# Patient Record
Sex: MALE | HISPANIC OR LATINO | Employment: UNEMPLOYED | URBAN - METROPOLITAN AREA
[De-identification: names, ages, dates, MRNs, and addresses within clinical notes are randomized per-mention and may not be internally consistent; named-entity substitution may affect disease eponyms.]

---

## 2021-06-07 ENCOUNTER — HOSPITAL ENCOUNTER (EMERGENCY)
Facility: CLINIC | Age: 51
Discharge: PSYCHIATRIC OR SUBSTANCE USE FACILITY | End: 2021-06-07
Attending: EMERGENCY MEDICINE | Admitting: EMERGENCY MEDICINE

## 2021-06-07 ENCOUNTER — COMMUNICATION - HEALTHEAST (OUTPATIENT)
Dept: CARE COORDINATION | Facility: HOSPITAL | Age: 51
End: 2021-06-07

## 2021-06-07 ENCOUNTER — APPOINTMENT (OUTPATIENT)
Dept: CT IMAGING | Facility: CLINIC | Age: 51
End: 2021-06-07
Attending: EMERGENCY MEDICINE

## 2021-06-07 ENCOUNTER — TELEPHONE (OUTPATIENT)
Dept: BEHAVIORAL HEALTH | Facility: CLINIC | Age: 51
End: 2021-06-07

## 2021-06-07 VITALS
DIASTOLIC BLOOD PRESSURE: 82 MMHG | RESPIRATION RATE: 16 BRPM | TEMPERATURE: 97.9 F | OXYGEN SATURATION: 98 % | SYSTOLIC BLOOD PRESSURE: 127 MMHG | HEART RATE: 67 BPM

## 2021-06-07 DIAGNOSIS — F29 PSYCHOSIS, UNSPECIFIED PSYCHOSIS TYPE (H): ICD-10-CM
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
## 2021-06-07 LAB
ALBUMIN SERPL-MCNC: 4.1 G/DL (ref 3.4–5)
ALBUMIN UR-MCNC: 20 MG/DL
ALP SERPL-CCNC: 97 U/L (ref 40–150)
ALT SERPL W P-5'-P-CCNC: 33 U/L (ref 0–70)
AMMONIA PLAS-SCNC: 30 UMOL/L (ref 10–50)
AMPHETAMINES UR QL SCN: NEGATIVE
ANION GAP SERPL CALCULATED.3IONS-SCNC: 5 MMOL/L (ref 3–14)
APAP SERPL-MCNC: <2 MG/L (ref 10–20)
APPEARANCE UR: CLEAR
AST SERPL W P-5'-P-CCNC: 29 U/L (ref 0–45)
BARBITURATES UR QL: NEGATIVE
BASE EXCESS BLDV CALC-SCNC: 1.8 MMOL/L
BASOPHILS # BLD AUTO: 0.1 10E9/L (ref 0–0.2)
BASOPHILS NFR BLD AUTO: 0.6 %
BENZODIAZ UR QL: NEGATIVE
BILIRUB SERPL-MCNC: 0.7 MG/DL (ref 0.2–1.3)
BILIRUB UR QL STRIP: NEGATIVE
BUN SERPL-MCNC: 19 MG/DL (ref 7–30)
CALCIUM SERPL-MCNC: 8.8 MG/DL (ref 8.5–10.1)
CANNABINOIDS UR QL SCN: NEGATIVE
CHLORIDE SERPL-SCNC: 109 MMOL/L (ref 94–109)
CO2 SERPL-SCNC: 27 MMOL/L (ref 20–32)
COCAINE UR QL: NEGATIVE
COLOR UR AUTO: YELLOW
CREAT SERPL-MCNC: 0.88 MG/DL (ref 0.66–1.25)
DIFFERENTIAL METHOD BLD: NORMAL
EOSINOPHIL # BLD AUTO: 0.3 10E9/L (ref 0–0.7)
EOSINOPHIL NFR BLD AUTO: 2.9 %
ERYTHROCYTE [DISTWIDTH] IN BLOOD BY AUTOMATED COUNT: 12.7 % (ref 10–15)
ETHANOL SERPL-MCNC: <0.01 G/DL
GFR SERPL CREATININE-BSD FRML MDRD: >90 ML/MIN/{1.73_M2}
GLUCOSE SERPL-MCNC: 128 MG/DL (ref 70–99)
GLUCOSE UR STRIP-MCNC: NEGATIVE MG/DL
HCO3 BLDV-SCNC: 27 MMOL/L (ref 21–28)
HCT VFR BLD AUTO: 44 % (ref 40–53)
HGB BLD-MCNC: 14.7 G/DL (ref 13.3–17.7)
HGB UR QL STRIP: NEGATIVE
IMM GRANULOCYTES # BLD: 0 10E9/L (ref 0–0.4)
IMM GRANULOCYTES NFR BLD: 0.2 %
INR PPP: 1.09 (ref 0.86–1.14)
INTERPRETATION ECG - MUSE: NORMAL
KETONES UR STRIP-MCNC: 10 MG/DL
LABORATORY COMMENT REPORT: NORMAL
LACTATE BLD-SCNC: 1.3 MMOL/L (ref 0.7–2)
LEUKOCYTE ESTERASE UR QL STRIP: ABNORMAL
LYMPHOCYTES # BLD AUTO: 1.5 10E9/L (ref 0.8–5.3)
LYMPHOCYTES NFR BLD AUTO: 16.3 %
MCH RBC QN AUTO: 29.1 PG (ref 26.5–33)
MCHC RBC AUTO-ENTMCNC: 33.4 G/DL (ref 31.5–36.5)
MCV RBC AUTO: 87 FL (ref 78–100)
MONOCYTES # BLD AUTO: 0.8 10E9/L (ref 0–1.3)
MONOCYTES NFR BLD AUTO: 8.9 %
MUCOUS THREADS #/AREA URNS LPF: PRESENT /LPF
NEUTROPHILS # BLD AUTO: 6.4 10E9/L (ref 1.6–8.3)
NEUTROPHILS NFR BLD AUTO: 71.1 %
NITRATE UR QL: NEGATIVE
NRBC # BLD AUTO: 0 10*3/UL
NRBC BLD AUTO-RTO: 0 /100
O2/TOTAL GAS SETTING VFR VENT: NORMAL %
OPIATES UR QL SCN: NEGATIVE
PCO2 BLDV: 45 MM HG (ref 40–50)
PCP UR QL SCN: NEGATIVE
PH BLDV: 7.39 PH (ref 7.32–7.43)
PH UR STRIP: 6 PH (ref 5–7)
PLATELET # BLD AUTO: 288 10E9/L (ref 150–450)
PO2 BLDV: 37 MM HG (ref 25–47)
POTASSIUM SERPL-SCNC: 3.4 MMOL/L (ref 3.4–5.3)
PROT SERPL-MCNC: 8 G/DL (ref 6.8–8.8)
RBC # BLD AUTO: 5.05 10E12/L (ref 4.4–5.9)
RBC #/AREA URNS AUTO: 2 /HPF (ref 0–2)
SALICYLATES SERPL-MCNC: <2 MG/DL
SARS-COV-2 RNA RESP QL NAA+PROBE: NEGATIVE
SODIUM SERPL-SCNC: 141 MMOL/L (ref 133–144)
SOURCE: ABNORMAL
SP GR UR STRIP: 1.01 (ref 1–1.03)
SPECIMEN SOURCE: NORMAL
SQUAMOUS #/AREA URNS AUTO: <1 /HPF (ref 0–1)
TROPONIN I SERPL-MCNC: <0.015 UG/L (ref 0–0.04)
TSH SERPL DL<=0.005 MIU/L-ACNC: 1.21 MU/L (ref 0.4–4)
UROBILINOGEN UR STRIP-MCNC: 6 MG/DL (ref 0–2)
WBC # BLD AUTO: 9 10E9/L (ref 4–11)
WBC #/AREA URNS AUTO: 16 /HPF (ref 0–5)

## 2021-06-07 PROCEDURE — 84484 ASSAY OF TROPONIN QUANT: CPT | Performed by: EMERGENCY MEDICINE

## 2021-06-07 PROCEDURE — 84443 ASSAY THYROID STIM HORMONE: CPT | Performed by: EMERGENCY MEDICINE

## 2021-06-07 PROCEDURE — 80307 DRUG TEST PRSMV CHEM ANLYZR: CPT | Performed by: EMERGENCY MEDICINE

## 2021-06-07 PROCEDURE — 85025 COMPLETE CBC W/AUTO DIFF WBC: CPT | Performed by: EMERGENCY MEDICINE

## 2021-06-07 PROCEDURE — 85610 PROTHROMBIN TIME: CPT | Performed by: EMERGENCY MEDICINE

## 2021-06-07 PROCEDURE — 93005 ELECTROCARDIOGRAM TRACING: CPT

## 2021-06-07 PROCEDURE — 80053 COMPREHEN METABOLIC PANEL: CPT | Performed by: EMERGENCY MEDICINE

## 2021-06-07 PROCEDURE — 87635 SARS-COV-2 COVID-19 AMP PRB: CPT | Performed by: EMERGENCY MEDICINE

## 2021-06-07 PROCEDURE — 70450 CT HEAD/BRAIN W/O DYE: CPT

## 2021-06-07 PROCEDURE — 83605 ASSAY OF LACTIC ACID: CPT | Performed by: EMERGENCY MEDICINE

## 2021-06-07 PROCEDURE — C9803 HOPD COVID-19 SPEC COLLECT: HCPCS

## 2021-06-07 PROCEDURE — 81001 URINALYSIS AUTO W/SCOPE: CPT | Performed by: EMERGENCY MEDICINE

## 2021-06-07 PROCEDURE — 99285 EMERGENCY DEPT VISIT HI MDM: CPT | Mod: 25

## 2021-06-07 PROCEDURE — 90791 PSYCH DIAGNOSTIC EVALUATION: CPT

## 2021-06-07 PROCEDURE — 96360 HYDRATION IV INFUSION INIT: CPT

## 2021-06-07 PROCEDURE — 82077 ASSAY SPEC XCP UR&BREATH IA: CPT | Performed by: EMERGENCY MEDICINE

## 2021-06-07 PROCEDURE — 96361 HYDRATE IV INFUSION ADD-ON: CPT

## 2021-06-07 PROCEDURE — 82140 ASSAY OF AMMONIA: CPT | Performed by: EMERGENCY MEDICINE

## 2021-06-07 PROCEDURE — 258N000003 HC RX IP 258 OP 636: Performed by: EMERGENCY MEDICINE

## 2021-06-07 PROCEDURE — 82803 BLOOD GASES ANY COMBINATION: CPT | Performed by: EMERGENCY MEDICINE

## 2021-06-07 PROCEDURE — 80143 DRUG ASSAY ACETAMINOPHEN: CPT | Performed by: EMERGENCY MEDICINE

## 2021-06-07 PROCEDURE — 80179 DRUG ASSAY SALICYLATE: CPT | Performed by: EMERGENCY MEDICINE

## 2021-06-07 RX ORDER — LIDOCAINE 40 MG/G
CREAM TOPICAL
Status: DISCONTINUED | OUTPATIENT
Start: 2021-06-07 | End: 2021-06-07 | Stop reason: HOSPADM

## 2021-06-07 RX ORDER — SODIUM CHLORIDE 9 MG/ML
INJECTION, SOLUTION INTRAVENOUS CONTINUOUS
Status: DISCONTINUED | OUTPATIENT
Start: 2021-06-07 | End: 2021-06-07 | Stop reason: HOSPADM

## 2021-06-07 RX ADMIN — SODIUM CHLORIDE 1000 ML: 9 INJECTION, SOLUTION INTRAVENOUS at 07:38

## 2021-06-07 NOTE — ED NOTES
Went into room, via  services, talked to patient about the need and giving us a urine sample.  Patient still remains non verbal but has eyes open and gazing forward with tears coming down his face. Explained that I would leave the urinal at bedside and give him some privacy and a few minutes to give us a sample. RN notified

## 2021-06-07 NOTE — ED PROVIDER NOTES
History   Chief Complaint:  Mental Health Problem     The history is provided by the patient and the EMS personnel. History limited by: Mental status change. A  was used (Icelandic).      Juan Mackenzie is a 50 year old male who presents with mental health problem. He was here two days ago, presents via EMS. He was found laying in the grass outside of a store and was brought in by EMS. He apparently would not respond to sternal rub or talk to them even using an . Here, the patient is complaining of that he was tired so he laid down. This happened approximately 30 days. He denies he has any symptoms. He has no other symptoms other than being tired. He denies loss of consciousness. He did say there is someone in the [employer] board who is trying to hurt him and tying to move the needle. He denies any thoughts of harming himself.     Review of Systems   Unable to perform ROS: Mental status change     Allergies:  Not reviewed.     Medications:  Not reviewed.     Past Medical History:    Not reviewed.     Social History:  Arrives via EMS.     Physical Exam     Patient Vitals for the past 24 hrs:   BP Temp Temp src Pulse Resp SpO2   06/07/21 1320 122/82 -- -- 65 -- 97 %   06/07/21 1030 92/63 -- -- 81 -- 94 %   06/07/21 1015 96/72 -- -- 79 -- 98 %   06/07/21 1000 95/69 -- -- 87 -- 92 %   06/07/21 0945 95/62 -- -- 84 -- 92 %   06/07/21 0930 96/71 -- -- 85 -- 92 %   06/07/21 0915 104/76 -- -- 88 -- 94 %   06/07/21 0830 110/75 -- -- 92 -- 92 %   06/07/21 0815 125/86 -- -- 79 -- 99 %   06/07/21 0800 132/78 -- -- 87 -- 98 %   06/07/21 0709 (!) 134/94 97.9  F (36.6  C) Axillary 95 16 96 %       Physical Exam    General: The patient is alert, in no respiratory distress.    HENT: Mucous membranes moist. Eye are injected.     Cardiovascular: Regular rate and rhythm. Good pulses in all four extremities. Normal capillary refill and skin turgor.     Respiratory: Lungs are clear. No nasal flaring. No  retractions. No wheezing, no crackles.    Gastrointestinal: Abdomen soft. No guarding, no rebound. No palpable hernias.     Musculoskeletal: No gross deformity.     Skin: No rashes or petechiae.     Neurologic: The patient is aler. GCS 15.  Follows commands.  We will discuss questions with the .  Good strength in all extremities.  Gross sensation intact. Pupils are round and reactive. No meningismus. Speech is appropriate via Chilean interpretor.     Lymphatic: No cervical adenopathy. No lower extremity swelling.    Psychiatric: The patient is non-tearful.  He does report hallucinations.    Emergency Department Course   ECG  ECG taken at 0955, ECG read at 0955  Normal sinus rhythm   Normal ECG   Rate 94 bpm. ID interval 152 ms. QRS duration 84 ms. QT/QTc 380/475 ms. P-R-T axes 42 -8 -15.     Imaging:  CT Head w/o Contrast  No acute intracranial abnormality.  Reading per radiology    Laboratory:  CBC: WBC 9.0, HGB 14.7,    CMP: Glucose 128 (H), o/w WNL (Creatinine: 0.88)    Troponin (Collected 0739): <0.015  Lactic Acid (Collected at 0739): 1.3  INR: 1.09    TSH: 1.21    UA with Microscopic: Ketone: 10 (A), Protein Albumin: 20 (A), Urobilinogen: 6.0 (H), Leukocyte Esterase: Trace (A), WBC: 16 (H), Mucous: Present (A)  Ammonia: 30    Blood Gas Venous: pH: 7.39, PCO2: 45, PO2: 37, Bicarbonate: 27, FIO2 Room Air, base excess 1.8     Acetaminophen: <2  Salicylate level: <2   Alcohol ethyl: <0.01    Drug abuse screen 77 urine: Pending  Asymptomatic COVID19 Virus PCR by nasopharyngeal swab: Negative    Emergency Department Course:    Reviewed:  I reviewed nursing notes and vitals    Assessments:  0712 I obtained history and examined the patient as noted above.   1247 I spoke to the patient and informed him our his lab results and plan of care.     Consults:   1240 Patient will be sent to [facility] at 1700.     Interventions:  0738 0.9% NaCl bolus 1,000 mL IV    Disposition:  The patient  was transferred to [facility] via ambulance.   Impression & Plan     Medical Decision Making:  The patient presented via EMS as a syncopal event.  However there was report that he was less responsive.  However I upon walking up to him in his hallway bed was able to get him to way back to me to mirror me in showing thumbs up with both hands I could not find any focal deficit.  I was able to use rudimentary Sami to talk to him and he answered me and then for the rest of the interview I used a formal .  I did consider intracranial process such as stroke and seizure but felt these are less likely I cannot find a deficit.  I also considered alcohol intoxication or drug intoxication however there is really no alkaline system.  He does not appear to be postictal.  The patient did however via  admit to me that he was being persecuted by someone via [employer] board and that those voices were telling him things.  He denies being suicidal.  He was advised by malcolm and will be sent to an inpatient psychiatric bed.  I do not find anything to suggest an ongoing or life-threatening medical condition behind this.  He does at times appear to be distracted by voices.  He is homeless and has had inadequate sleep he reports as well.    Covid-19  Juan Mackenzie was evaluated during a global COVID-19 pandemic, which necessitated consideration that the patient might be at risk for infection with the SARS-CoV-2 virus that causes COVID-19.   Applicable protocols for evaluation were followed during the patient's care. COVID-19 was considered as part of the patient's evaluation. The plan for testing is: a test was obtained during this visit which returned negative.     Diagnosis:    ICD-10-CM    1. Psychosis, unspecified psychosis type (H)  F29    2. Fall, initial encounter  W19.XXXA        Scribe Disclosure:  I, Viral Hernandez, am serving as a scribe at 7:11 AM on 6/7/2021 to document services  personally performed by Joe Murillo MD based on my observations and the provider's statements to me.              Joe Murillo MD  06/07/21 2800

## 2021-06-07 NOTE — ED NOTES
Attempted to call [facility]. Told that they can't take patient at moment due to staffing and no foreseeable changes to that and that we should touch base with placement. HUC made aware and to call patient placement to see what to do next.

## 2021-06-07 NOTE — TELEPHONE ENCOUNTER
"S: 1:05p, Samantha called w/ clinical on a 50y/M present in the [facility] w/ auditory hallucinations and altered mental status.     B: The pt was transported to the [facility] after being found in the grass out side of a [employer] grocery store passed out. The pt presents with  auditory hallucinations and altered mental status. The pt states that he hears a woman's voice that is \"mean\" and is telling him to \"harm himself\", pt stated to the  that he doesn't want to. The pt also stated to the  that \"there is someone in the Ouija board trying to hurt him and move the needle\". The  reported the pt goes by many nicknames but could not provide any of them due to his intermittent catatonic state, the pt answers where also not consistant. The pt was responding to internal stimuli during the assessment. The pt denied any substance use.      The pt has a MH Hx of schizophrenia.     The pt is not on any Rx.     The pt does not have any outside providers.     The pt denies any SI/HI.     No concern for aggression.     Covid resulted as negative.   Utox needs to be collected.   Comp labs complete- all within normal ranges.  CBC labs complete- all within normal ranges.      A: 72 hr hold.     R: The pt is currently in the [facility] awaiting bed placement.     1:43p Intake paged provider to present for Unit 30 ([place]).     1:52p Provider returned intakes page- provider accepts, provider would like to see the Utox collected before transfer.     2:00p Intake called Unit 10 to go over admission in que. The Charge RN is currently on break. Intake left a message to call intake when available to go over admission in que.      2:10p Intake called [facility] ER with provider request for admission.    3:30p Intake called Unit 10 Intake called Unit 10 to go over admission in que. The Charge RN is currently still in report.  Intake left a message to call intake when available to go over admission in que.     4:40p Unit " 30 called intake- the unit cannot take the pt due to staffing and acuity.     4:48p Intake called [facility] to notify the RN the pt would not be going to Unit 30. Intake identifying appropriate bed placement options.     4:57p Intake called provider to present for 5500. Provider accepts the pt.      5:05p Intake called Unit 5500 to go over admission in que. Intake faxed information to Unit 5500.     5:09p Intake called [facility] to notify the RN of the pt's bed placement.

## 2021-06-07 NOTE — ED NOTES
ED signout Note    Awaiting MH transfer.      Remained calm and transferred without incident.     MD Kalin Viveros, Ezra Ham MD  06/08/21 0134

## 2021-06-07 NOTE — SAFE
"Juan Mackenzie  June 7, 2021    Patient has been hospitalized numerous times prior, likely under another name.  He reported being at [facility] 3 times prior.  There is no record under this name. He is cycling through periods of varying responsiveness, entering into periods of catatonia.  When responsive, he is pleasant and cooperative.  He is aware he has a mental health condition.  He shows schizophrenic symptomatology.      Patient is having auditory and perhaps visual hallucinations. At one point reached out his hand tentatively as if to touch someone or something.  He states one voice in particular wants him to hurt himself.  He states he \"loses consciousness\" on occasion.   He states he takes no medications and has no providers however.      He does not appear to have a permanent residence.   He has shown no aggressiveness in the ED  or with this writer.       Current Suicidal Ideation/Self-Injurious Concerns/Methods: Other Has a command voice encouraging self harm    Inappropriate Sexual Behavior: Unknown    Aggression/Homicidal Ideation: None - N/A      For additional details see full DEC assessment.       Samantha Barillas, BEATRIZSW      "

## 2021-06-07 NOTE — ED NOTES
Pt brought in by ambulance found sleeping in grass by hi v in [facility] police did a sternal rub pt slow to respond  Blew pbp 000. Pt homeless searched and no meds , no drugs and they said I was crazy when I was here . Pt was not found in computer

## 2021-06-08 ENCOUNTER — COMMUNICATION - HEALTHEAST (OUTPATIENT)
Dept: SCHEDULING | Facility: CLINIC | Age: 51
End: 2021-06-08

## 2021-06-16 PROBLEM — F29 PSYCHOSIS, UNSPECIFIED PSYCHOSIS TYPE (H): Status: ACTIVE | Noted: 2021-06-08

## 2021-06-16 PROBLEM — F39 MOOD DISORDER (H): Status: ACTIVE | Noted: 2021-06-07

## 2021-06-16 PROBLEM — F39 PSYCHOSIS, AFFECTIVE (H): Status: ACTIVE | Noted: 2021-06-09

## 2021-06-17 ENCOUNTER — COMMUNICATION - HEALTHEAST (OUTPATIENT)
Dept: SCHEDULING | Facility: CLINIC | Age: 51
End: 2021-06-17

## 2021-06-25 NOTE — TELEPHONE ENCOUNTER
"S: 1:05p, Samantha called w/ clinical on a 50y/M present in the [facility] w/ auditory hallucinations and altered mental status.      B: The pt was transported to the [facility] after being found in the grass out side of a [employer] grocery store passed out. The pt presents with  auditory hallucinations and altered mental status. The pt states that he hears a woman's voice that is \"mean\" and is telling him to \"harm himself\", pt stated to the  that he doesn't want to. The pt also stated to the  that \"there is someone in the Ouija board trying to hurt him and move the needle\". The  reported the pt goes by many nicknames but could not provide any of them due to his intermittent catatonic state, the pt answers where also not consistant. The pt was responding to internal stimuli during the assessment. The pt denied any substance use.       The pt has a MH Hx of schizophrenia.      The pt is not on any Rx.      The pt does not have any outside providers.      The pt denies any SI/HI.      No concern for aggression.      Covid resulted as negative.   Utox needs to be collected.   Comp labs complete- all within normal ranges.  CBC labs complete- all within normal ranges.      A: 72 hr hold.      R: The pt is currently in the [facility] awaiting bed placement.   1:43p Intake paged provider to present for Unit 30 ([place]).      1:52p Provider returned intakes page- provider accepts, provider would like to see the Utox collected before transfer.      2:00p Intake called Unit 10 to go over admission in que. The Charge RN is currently on break. Intake left a message to call intake when available to go over admission in que.       2:10p Intake called [facility] ER with provider request for admission.     3:30p Intake called Unit 10 Intake called Unit 10 to go over admission in que. The Charge RN is currently still in report.  Intake left a message to call intake when available to go over admission in que.   "   4:40p Unit 30 called intake- the unit cannot take the pt due to staffing and acuity.      4:48p Intake called [facility] to notify the RN the pt would not be going to Unit 30. Intake identifying appropriate bed placement options.     4:57p Intake called provider to present for 5500. Provider accepts the pt.     5:05p Intake called Unit 5500 to go over admission in que. Intake faxed information to Unit 5500.     5:09p Intake called [facility] to notify the RN of the pt's bed placement.

## 2022-11-16 ENCOUNTER — HOSPITAL ENCOUNTER (EMERGENCY)
Facility: CLINIC | Age: 52
Discharge: LEFT BEFORE DISCHARGE | End: 2022-11-16
Attending: EMERGENCY MEDICINE | Admitting: EMERGENCY MEDICINE

## 2022-11-16 VITALS
SYSTOLIC BLOOD PRESSURE: 135 MMHG | DIASTOLIC BLOOD PRESSURE: 93 MMHG | OXYGEN SATURATION: 95 % | TEMPERATURE: 99.6 F | RESPIRATION RATE: 18 BRPM | HEART RATE: 92 BPM

## 2022-11-16 DIAGNOSIS — F22 DELUSIONAL DISORDER (H): ICD-10-CM

## 2022-11-16 DIAGNOSIS — Z59.00 HOMELESSNESS: ICD-10-CM

## 2022-11-16 LAB
GLUCOSE BLDC GLUCOMTR-MCNC: 140 MG/DL (ref 70–99)
HOLD SPECIMEN: NORMAL

## 2022-11-16 PROCEDURE — 36415 COLL VENOUS BLD VENIPUNCTURE: CPT | Performed by: EMERGENCY MEDICINE

## 2022-11-16 PROCEDURE — 99283 EMERGENCY DEPT VISIT LOW MDM: CPT

## 2022-11-16 SDOH — ECONOMIC STABILITY - HOUSING INSECURITY: HOMELESSNESS UNSPECIFIED: Z59.00

## 2022-11-16 NOTE — ED TRIAGE NOTES
Beninese speaking pt found in the middle of busy road/intersection today praying. PD had pt in vehicle to stay warm when EMS arrived.      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  Pt also states his brothers were also with him in prayer, but per report he was alone in the street. Now pt states brothers were with him in spirit. When EMS arrived pt refused to speak or open eyes.

## 2022-11-16 NOTE — ED PROVIDER NOTES
"  History   Chief Complaint:  Psychiatric Evaluation       The history is provided by the patient.   History obtained with assistance of Egyptian .    Juan Mackenzie is a 51 year old male with history of pyschosis and prior drug abuse who presents via EMS after he was found praying outside. Patient states that he was praying on the street when he was brought to emergency department. Patient states that he currently lives in a trailer but he wants to get out of the trailer. Patient states that he hears voices sometimes but he does not find them bothersome. He reports he is waiting to hear from \" [place] soldiers\" to tell him \"what to do next\"  patient states that he does not take any medication for his diabetes or for his mental health. Patient states that he is physically fine. He denies suicidal thoughts and thoughts of harming others. He does not remember the last time he had drugs or alcohol. He denies any recent alcohol or drug use.  He denies any other concerns at this time.  When asked directly if he would like any help with his mental health he states \"no.\"    1747  Police report:  Male Uzbek speaking only. Told me he was praying, then he refused to open his eyes or speak. Couldn't walk I believe on his own was laying in a snowbank and running into traffic. In a previous incident told officers he had received psychiatric care but wouldn't elaborate further    Review of Systems   All other systems reviewed and are negative.      Allergies:  The patient has no known allergies to medications.    Medications:  Zyprexa    Past Medical History:     Mood disorder  Psychosis  Abnormal urinalysis  Vasovagal syncope  Delusions  Alcohol abuse    Past Surgical History:    The patient denies any prior surgical history.    Family History:    The patient denies any prior family history.    Social History:  PCP: No Ref-Primary, Physician   Patient arrived via EMS.  Patient is unaccompanied in the " "ED.  Patient denies drug use.  Patient denies current alcohol use.      Physical Exam     Patient Vitals for the past 24 hrs:   BP Temp Temp src Pulse Resp SpO2   11/16/22 1747 (!) 135/93 99.6  F (37.6  C) Temporal 92 18 95 %       Physical Exam  General: Well appearing, nontoxic. Resting comfortably  Head:  Scalp, face, and head appear normal  Eyes:  Pupils are equal, round, reactive to light    Conjunctivae non-injected and sclerae white  ENT:    The external nose is normal    Pinnae are normal  Neck:  Normal range of motion    There is no rigidity noted    Trachea is in the midline  CV:  Regular rate and rhythm     Normal S1/S2, no S3/S4    No murmur or rub. Radial pulses 2+ bilaterally.  Resp:  Lungs are clear and equal bilaterally  There is no tachypnea    No increased work of breathing    No rales, wheezing, or rhonchi  GI:  Abdomen is soft, no rigidity or guarding    No distension, or mass    No tenderness or rebound tenderness   MS:  Normal muscular tone    Symmetric motor strength    No lower extremity edema  Skin:  No rash or acute skin lesions noted  Neuro: Awake and alert oriented to person place and time.  Strength 5 out of 5 and intact.  Gait is normal and steady.  No ataxia.  Speech is normal and fluent  Moves all extremities spontaneously  Psych:  Normal affect.  Patient is calm and cooperative.  He reports that he does hear voices but they are not bothersome to him.  He denies any suicidal or homicidal ideation.  He does not appear to be attending to internal stimuli.  He does display some delusional thoughts regarding \"is really soldiers\".  No psychomotor agitation.      Emergency Department Course   Laboratory:  Labs Ordered and Resulted from Time of ED Arrival to Time of ED Departure   GLUCOSE BY METER - Abnormal       Result Value    GLUCOSE BY METER POCT 140 (*)         Emergency Department Course:           Reviewed:  I reviewed nursing notes, vitals, past medical history and Care " "Everywhere    Assessments/Consults:  ED Course as of 11/16/22 1911 Wed Nov 16, 2022 1747 I obtained history and examined the patient as noted above.    1829 I rechecked the patient.         Disposition:  The patient was discharged to home.     Impression & Plan   Medical Decision Making:  Juan Mackenzie is a 51 year old male who presents to the emergency department for evaluation after he was found praying outside.  On my evaluation he is well-appearing, hemodynamically stable and afebrile.  He is calm cooperative and interacting appropriately.  He does not clinically appear to be acutely intoxicated.  He denies any recent drug or alcohol use.  Patient denies any mental health concerns that he would like assistance with today.  He does note that he hears voices but that they are not bothersome to him.  He has a history of prior admissions for psychosis.  At this time he does display evidence of some delusional thoughts as he is waiting to hear from \"[place] soldiers\" and seems preoccupied with thoughts about Cheondoism and praying.  Mental health assessment via the DEC team was offered but patient declined.  At this time he is not an acute risk of harm to himself or others.  He is suffering from underlying mental health conditions but is not wishing to pursue further help for this at this time.  I encouraged him to follow-up with outpatient mental health resources.  Patient did not want to stay in the emergency department for further evaluation.  No indication for psychiatric hold against his will at this time.  Patient left the emergency department prior to receiving his discharge paperwork. He reports he is currently living in a \"trailer.\"       Diagnosis:    ICD-10-CM    1. Homelessness  Z59.00       2. Delusional disorder (H)  F22           Discharge Medications:  Discharge Medication List as of 11/16/2022  6:34 PM          Scribe Disclosure:  I, Eran Estrada, am serving as a scribe at 5:46 PM on " 11/16/2022 to document services personally performed by Arnaud Escobar MD based on my observations and the provider's statements to me.            Arnaud Escobar MD  11/16/22 1911

## 2022-11-17 NOTE — ED NOTES
Pt ripped off wristband and pulled out IV saying he is going, provider aware and discharging patient.

## 2022-11-17 NOTE — DISCHARGE INSTRUCTIONS
Discharge Instructions  Mental Health Concerns    You were seen today for mental health concerns, such as depression, anxiety, or suicidal thinking. Your provider feels that you do not require hospitalization at this time. However, your symptoms may become worse, and you may need to return to the Emergency Department. Most treatments of depression and suicidal thoughts are a process rather than a single intervention.  Medications and counseling can take several weeks or more to help.    Generally, every Emergency Department visit should have a follow-up clinic visit with either a primary or a specialty clinic/provider. Please follow-up as instructed by your emergency provider today.    By accepting these discharge instructions:  You promise to not harm yourself or others.  You agree that if you feel you are becoming unable to keep that promise, you will do something to help yourself before you do anything to harm yourself or others.   You agree to keep any safety plan arranged on your visit here today.  You agree to take any medication prescribed or recommended by your provider.  If you are getting worse, you can contact a friend or a family member, contact your counselor or family provider, contact a crisis line, or other options discussed with the provider or therapist today.  At any time, you can call 911 and return to the Emergency Department for more help.  You understand that follow-up is essential to your treatment, and you will make and keep appointments recommended on your visit today.    How to improve your mental health and prevent suicide:  Involve others by letting family, friends, counselors know.  Do not isolate yourself.  Avoid alcohol or drugs. Remove weapons, poisons from your home.  Try to stick to routines for eating, sleeping and getting regular exercise.    Try to get into sunlight. Bright natural light not only treats seasonal affective disorder but also depression.  Increase safe activities  that you enjoy.    If you feel worse, contact 1-800-suicide (1-159.181.8986), or call 911, or your primary provider/counselor for additional assistance.    If you were given a prescription for medicine here today, be sure to read all of the information (including the package insert) that comes with your prescription.  This will include important information about the medicine, its side effects, and any warnings that you need to know about.  The pharmacist who fills the prescription can provide more information and answer questions you may have about the medicine.  If you have questions or concerns that the pharmacist cannot address, please call or return to the Emergency Department.   Remember that you can always come back to the Emergency Department if you are not able to see your regular provider in the amount of time listed above, if you get any new symptoms, or if there is anything that worries you.

## 2022-12-29 ENCOUNTER — MEDICAL CORRESPONDENCE (OUTPATIENT)
Dept: EMERGENCY MEDICINE | Facility: CLINIC | Age: 52
End: 2022-12-29

## 2022-12-29 ENCOUNTER — HOSPITAL ENCOUNTER (EMERGENCY)
Facility: CLINIC | Age: 52
Discharge: NURSING, REHAB OR LONG-TERM CARE | End: 2022-12-30
Attending: EMERGENCY MEDICINE | Admitting: EMERGENCY MEDICINE

## 2022-12-29 ENCOUNTER — TELEPHONE (OUTPATIENT)
Dept: BEHAVIORAL HEALTH | Facility: CLINIC | Age: 52
End: 2022-12-29

## 2022-12-29 DIAGNOSIS — Z86.59 HISTORY OF PSYCHOSIS: ICD-10-CM

## 2022-12-29 DIAGNOSIS — R45.851 SUICIDAL IDEATION: ICD-10-CM

## 2022-12-29 LAB
AMPHETAMINES UR QL SCN: NORMAL
ANION GAP SERPL CALCULATED.3IONS-SCNC: 14 MMOL/L (ref 7–15)
BARBITURATES UR QL SCN: NORMAL
BENZODIAZ UR QL SCN: NORMAL
BUN SERPL-MCNC: 15.3 MG/DL (ref 6–20)
BZE UR QL SCN: NORMAL
CALCIUM SERPL-MCNC: 9.5 MG/DL (ref 8.6–10)
CANNABINOIDS UR QL SCN: NORMAL
CHLORIDE SERPL-SCNC: 100 MMOL/L (ref 98–107)
CREAT SERPL-MCNC: 0.78 MG/DL (ref 0.67–1.17)
DEPRECATED HCO3 PLAS-SCNC: 25 MMOL/L (ref 22–29)
ETHANOL SERPL-MCNC: <0.01 G/DL
GFR SERPL CREATININE-BSD FRML MDRD: >90 ML/MIN/1.73M2
GLUCOSE SERPL-MCNC: 140 MG/DL (ref 70–99)
OPIATES UR QL SCN: NORMAL
POTASSIUM SERPL-SCNC: 3.7 MMOL/L (ref 3.4–5.3)
SARS-COV-2 RNA RESP QL NAA+PROBE: NEGATIVE
SODIUM SERPL-SCNC: 139 MMOL/L (ref 136–145)

## 2022-12-29 PROCEDURE — 82077 ASSAY SPEC XCP UR&BREATH IA: CPT | Performed by: EMERGENCY MEDICINE

## 2022-12-29 PROCEDURE — 250N000013 HC RX MED GY IP 250 OP 250 PS 637: Performed by: EMERGENCY MEDICINE

## 2022-12-29 PROCEDURE — U0003 INFECTIOUS AGENT DETECTION BY NUCLEIC ACID (DNA OR RNA); SEVERE ACUTE RESPIRATORY SYNDROME CORONAVIRUS 2 (SARS-COV-2) (CORONAVIRUS DISEASE [COVID-19]), AMPLIFIED PROBE TECHNIQUE, MAKING USE OF HIGH THROUGHPUT TECHNOLOGIES AS DESCRIBED BY CMS-2020-01-R: HCPCS | Performed by: EMERGENCY MEDICINE

## 2022-12-29 PROCEDURE — 80048 BASIC METABOLIC PNL TOTAL CA: CPT | Performed by: EMERGENCY MEDICINE

## 2022-12-29 PROCEDURE — 99285 EMERGENCY DEPT VISIT HI MDM: CPT | Mod: 25

## 2022-12-29 PROCEDURE — 90791 PSYCH DIAGNOSTIC EVALUATION: CPT

## 2022-12-29 PROCEDURE — 80307 DRUG TEST PRSMV CHEM ANLYZR: CPT | Performed by: EMERGENCY MEDICINE

## 2022-12-29 PROCEDURE — 36415 COLL VENOUS BLD VENIPUNCTURE: CPT | Performed by: EMERGENCY MEDICINE

## 2022-12-29 RX ORDER — OLANZAPINE 5 MG/1
10 TABLET, ORALLY DISINTEGRATING ORAL 2 TIMES DAILY
Status: DISCONTINUED | OUTPATIENT
Start: 2022-12-29 | End: 2022-12-30 | Stop reason: HOSPADM

## 2022-12-29 RX ORDER — OLANZAPINE 5 MG/1
5 TABLET, ORALLY DISINTEGRATING ORAL ONCE
Status: COMPLETED | OUTPATIENT
Start: 2022-12-29 | End: 2022-12-29

## 2022-12-29 RX ADMIN — OLANZAPINE 5 MG: 5 TABLET, ORALLY DISINTEGRATING ORAL at 16:49

## 2022-12-29 ASSESSMENT — ACTIVITIES OF DAILY LIVING (ADL)
ADLS_ACUITY_SCORE: 35

## 2022-12-29 ASSESSMENT — COLUMBIA-SUICIDE SEVERITY RATING SCALE - C-SSRS
ATTEMPT LIFETIME: NO
1. HAVE YOU WISHED YOU WERE DEAD OR WISHED YOU COULD GO TO SLEEP AND NOT WAKE UP?: NO
2. HAVE YOU ACTUALLY HAD ANY THOUGHTS OF KILLING YOURSELF?: NO
TOTAL  NUMBER OF ABORTED OR SELF INTERRUPTED ATTEMPTS LIFETIME: NO
TOTAL  NUMBER OF INTERRUPTED ATTEMPTS LIFETIME: NO
6. HAVE YOU EVER DONE ANYTHING, STARTED TO DO ANYTHING, OR PREPARED TO DO ANYTHING TO END YOUR LIFE?: NO

## 2022-12-29 NOTE — ED TRIAGE NOTES
"Patient arrives via EMS, patient was found at a intersection attempting to jump in front of traffic. Patient was waiting at the intersection and would run out in front of the cars as they started to move. Patient reports that he is homeless and does not have family in the [place]. Patient denies having any medical issues but states he hears \"noise in his head\" when asked to clarify patient states that he hears voices in his head that tell him to do offensive things.  Writer asked patient if the voices told him to go in the road and he said \"yes\" but refused to answer all other mental health questions. Patient is also noted to have itching to his bilateral upper legs.  Patient changed into scrubs with no issue, patient has 3 belonging bags and a back pack in the Dec office.      Triage Assessment     Row Name 12/29/22 6525       Triage Assessment (Adult)    Airway WDL WDL       Respiratory WDL    Respiratory WDL WDL       Skin Circulation/Temperature WDL    Skin Circulation/Temperature WDL WDL       Cardiac WDL    Cardiac WDL WDL       Peripheral/Neurovascular WDL    Peripheral Neurovascular WDL WDL       Cognitive/Neuro/Behavioral WDL    Cognitive/Neuro/Behavioral WDL WDL              "

## 2022-12-30 ENCOUNTER — HOSPITAL ENCOUNTER (INPATIENT)
Facility: CLINIC | Age: 52
LOS: 61 days | Discharge: PSYCHIATRIC OR SUBSTANCE USE FACILITY | End: 2023-03-01
Attending: PSYCHIATRY & NEUROLOGY | Admitting: PSYCHIATRY & NEUROLOGY
Payer: MEDICAID

## 2022-12-30 VITALS
RESPIRATION RATE: 18 BRPM | HEART RATE: 71 BPM | OXYGEN SATURATION: 98 % | DIASTOLIC BLOOD PRESSURE: 59 MMHG | SYSTOLIC BLOOD PRESSURE: 98 MMHG | WEIGHT: 132 LBS | TEMPERATURE: 98.1 F

## 2022-12-30 DIAGNOSIS — F25.9 SCHIZOPHRENIA, SCHIZOAFFECTIVE, CHRONIC WITH ACUTE EXACERBATION (H): Primary | ICD-10-CM

## 2022-12-30 PROCEDURE — 250N000013 HC RX MED GY IP 250 OP 250 PS 637: Performed by: PSYCHIATRY & NEUROLOGY

## 2022-12-30 PROCEDURE — 250N000013 HC RX MED GY IP 250 OP 250 PS 637: Performed by: EMERGENCY MEDICINE

## 2022-12-30 PROCEDURE — 124N000003 HC R&B MH SENIOR/ADOLESCENT

## 2022-12-30 RX ORDER — OLANZAPINE 10 MG/1
10 TABLET ORAL 3 TIMES DAILY PRN
Status: DISCONTINUED | OUTPATIENT
Start: 2022-12-30 | End: 2023-03-01 | Stop reason: HOSPADM

## 2022-12-30 RX ORDER — IBUPROFEN 600 MG/1
600 TABLET, FILM COATED ORAL EVERY 6 HOURS PRN
Status: DISCONTINUED | OUTPATIENT
Start: 2022-12-30 | End: 2022-12-30 | Stop reason: HOSPADM

## 2022-12-30 RX ORDER — TRAZODONE HYDROCHLORIDE 50 MG/1
50 TABLET, FILM COATED ORAL
Status: DISCONTINUED | OUTPATIENT
Start: 2022-12-30 | End: 2023-03-01 | Stop reason: HOSPADM

## 2022-12-30 RX ORDER — ACETAMINOPHEN 325 MG/1
650 TABLET ORAL EVERY 4 HOURS PRN
Status: DISCONTINUED | OUTPATIENT
Start: 2022-12-30 | End: 2023-03-01 | Stop reason: HOSPADM

## 2022-12-30 RX ORDER — OLANZAPINE 10 MG/1
10 TABLET, ORALLY DISINTEGRATING ORAL 2 TIMES DAILY
Status: ON HOLD | COMMUNITY
End: 2023-02-15

## 2022-12-30 RX ORDER — LORAZEPAM 1 MG/1
1 TABLET ORAL EVERY 8 HOURS PRN
Status: DISCONTINUED | OUTPATIENT
Start: 2022-12-30 | End: 2022-12-30 | Stop reason: HOSPADM

## 2022-12-30 RX ORDER — OLANZAPINE 10 MG/2ML
10 INJECTION, POWDER, FOR SOLUTION INTRAMUSCULAR 3 TIMES DAILY PRN
Status: DISCONTINUED | OUTPATIENT
Start: 2022-12-30 | End: 2023-03-01 | Stop reason: HOSPADM

## 2022-12-30 RX ORDER — AMOXICILLIN 250 MG
1 CAPSULE ORAL 2 TIMES DAILY PRN
Status: DISCONTINUED | OUTPATIENT
Start: 2022-12-30 | End: 2023-03-01 | Stop reason: HOSPADM

## 2022-12-30 RX ORDER — LANOLIN ALCOHOL/MO/W.PET/CERES
3 CREAM (GRAM) TOPICAL
Status: DISCONTINUED | OUTPATIENT
Start: 2022-12-30 | End: 2022-12-30 | Stop reason: HOSPADM

## 2022-12-30 RX ORDER — LORAZEPAM 1 MG/1
1 TABLET ORAL EVERY 8 HOURS PRN
Status: ON HOLD | COMMUNITY
End: 2023-02-15

## 2022-12-30 RX ORDER — HYDROXYZINE HYDROCHLORIDE 25 MG/1
25 TABLET, FILM COATED ORAL EVERY 4 HOURS PRN
Status: DISCONTINUED | OUTPATIENT
Start: 2022-12-30 | End: 2023-03-01 | Stop reason: HOSPADM

## 2022-12-30 RX ORDER — MAGNESIUM HYDROXIDE/ALUMINUM HYDROXICE/SIMETHICONE 120; 1200; 1200 MG/30ML; MG/30ML; MG/30ML
30 SUSPENSION ORAL EVERY 4 HOURS PRN
Status: DISCONTINUED | OUTPATIENT
Start: 2022-12-30 | End: 2023-03-01 | Stop reason: HOSPADM

## 2022-12-30 RX ORDER — IBUPROFEN 600 MG/1
600 TABLET, FILM COATED ORAL EVERY 6 HOURS PRN
Status: ON HOLD | COMMUNITY
End: 2023-02-15

## 2022-12-30 RX ORDER — OLANZAPINE 10 MG/1
10 TABLET, ORALLY DISINTEGRATING ORAL 2 TIMES DAILY
Status: DISCONTINUED | OUTPATIENT
Start: 2022-12-30 | End: 2023-01-01

## 2022-12-30 RX ADMIN — OLANZAPINE 10 MG: 10 TABLET, ORALLY DISINTEGRATING ORAL at 20:06

## 2022-12-30 RX ADMIN — OLANZAPINE 10 MG: 5 TABLET, ORALLY DISINTEGRATING ORAL at 11:02

## 2022-12-30 ASSESSMENT — ACTIVITIES OF DAILY LIVING (ADL)
ADLS_ACUITY_SCORE: 35
DIFFICULTY_EATING/SWALLOWING: NO
CONCENTRATING,_REMEMBERING_OR_MAKING_DECISIONS_DIFFICULTY: YES
ADLS_ACUITY_SCORE: 35
ADLS_ACUITY_SCORE: 45
NUMBER_OF_TIMES_PATIENT_HAS_FALLEN_WITHIN_LAST_SIX_MONTHS: 1
ADLS_ACUITY_SCORE: 35
TOILETING_ISSUES: NO
DRESSING/BATHING_DIFFICULTY: NO
ORAL_HYGIENE: INDEPENDENT
DOING_ERRANDS_INDEPENDENTLY_DIFFICULTY: NO
ADLS_ACUITY_SCORE: 35
ADLS_ACUITY_SCORE: 31
CHANGE_IN_FUNCTIONAL_STATUS_SINCE_ONSET_OF_CURRENT_ILLNESS/INJURY: NO
ADLS_ACUITY_SCORE: 35
ADLS_ACUITY_SCORE: 31
DRESS: INDEPENDENT
ADLS_ACUITY_SCORE: 35
HYGIENE/GROOMING: INDEPENDENT
ADLS_ACUITY_SCORE: 35
ADLS_ACUITY_SCORE: 35
WALKING_OR_CLIMBING_STAIRS_DIFFICULTY: NO
WEAR_GLASSES_OR_BLIND: YES
ADLS_ACUITY_SCORE: 35

## 2022-12-30 NOTE — PHARMACY-ADMISSION MEDICATION HISTORY
Medication Reconciliation is completed.  Patient is currently not taking any medications prior to this admission per patient / Ghazal Florez RN.                   December 30, 2022                    Eben Cisneros RP

## 2022-12-30 NOTE — TELEPHONE ENCOUNTER
No appropriate beds are currently available within the  system. Bed search update ([facility]) @ 03:15AM:      [facility]: @ cap per website  [facility]: @ cap per website  [facility]: @ cap per website. Called @ 03:17 as website has not been updated within 12 hours. Per Jeremias they do not have beds available but suggests calling back after 08:30  [facility]: @ cap per website  [facility]: @ cap per website  [facility]: @ cap per website  [facility]: @ cap per website  [facility]: @cap per website    Pt remains on work list until appropriate placement is available

## 2022-12-30 NOTE — PLAN OF CARE
Juan Ordoñez  December 29, 2022  Plan of Care Hand-off Note     Patient Care Path: Inpatient Mental Health    Plan for Care:     Brought in by EMS after seen walking into moving traffic in an intersection. Providing limited history of report of events; denying SI but is not able to explain actions. Is reporting hearing constant voices and told triage that the voices told him to talk into traffic.     Pt with history of psychosis per his report and per chart with one prior admission in 2021. He has not been receiving any mental health services since time. Today is providing unreliable history and is guarded but was engaging in behavior that could have resulted in serious injury and is not able to provide any clarity or reasoning for this. Is experiencing active auditory  hallucinations. Recommend inpatient MH for safety and stabilization.     Critical Safety Issues: None.    Overview:  This patient is a child/adolescent: No    This patient has additional special visitor precautions: No    Legal Status: Voluntary    Contacts:   None provided    Psychiatry Consult:  Psychiatry Consult not requested because not needed at this time.     Updated Attending Provider regarding plan of care.    Jenny Vincent, BEATRIZSW

## 2022-12-30 NOTE — TELEPHONE ENCOUNTER
10:49 AM Intake paged Dr. Birch for presentation onto station 3A. Intake awaiting callback     10:54 AM Intake received call from Dr. Birch that patient is accepted to station 3A but patient should be reviewed by Carli RN manager since patient is 52 years old and 3B is 55+.     11:00 AM Intake called Carli RN manager for review of patient. Intake awaiting callback.     12:01 PM Intake received message that Carli accepts patient for 3B admission.     12:09 PM Patient added to queue, indicia completed and insurance benefits sent to [employer].     12:31 PM Intake called 3B charge RN and provided update about patient in queue. Charge RN states that staffing is short this shift and 3B will call intake when ready to take the patient. Intake will reach out to Jorge after hearing from charge RN.     1:43 PM Farideh STEVENSON charge RN called back, discharge will happen at 3pm which will open bed for patient. Jorge to call for report at 4pm per Farideh request.     1:48 PM Intake called Jorge Lombardi with patient placement information. Jorge RN to call for report after 4pm.

## 2022-12-30 NOTE — ED NOTES
Bed: ED14  Expected date: 12/30/22  Expected time: 6:14 AM  Means of arrival:   Comments:  HOld for 34

## 2022-12-30 NOTE — ED NOTES
Report given to LISSY Kramer at [facility] 3B. Updated patient on transfer, patient verbalized understanding. Patient remains calm, quiet cooperative throughout shift. Ate breakfast and lunch. Tolerated Po zyprexa this AM. Sleeping most of shift. 1:1 sitter remains at bedside.

## 2022-12-30 NOTE — ED PROVIDER NOTES
History     Chief Complaint:  Suicidal       HPI   Juan Ordoñez is a 52 year old male with apparent history of psychosis who presents for evaluation after abnormal behavior this evening.  The patient was reportedly in street attempting to run in front of cars.  He was guarded with police who picked him up and brought him here but indicated to an ED RN that he is having voices that are telling him to hurt himself including to run in front of cars.  In conversations with me, the patient denies any concerns but is evasive regarding why he was in the street.    Review of Systems   Unable to perform ROS: Mental status change     Psych: + as per above      Allergies:  No Known Allergies     Medications:    None      Past Medical History:    Psychosis     Past Surgical History:    None    Family History:    None    Social History:  Presents alone    Physical Exam   Patient Vitals for the past 24 hrs:   BP Temp Temp src Pulse Resp SpO2   12/29/22 1600 136/86 -- -- -- -- 97 %   12/29/22 1558 136/86 98.1  F (36.7  C) Oral 94 18 97 %        Physical Exam  Constitutional: Alert, attentive  HENT:    Nose: Nose normal.    Mouth/Throat: Oropharynx is clear, mucous membranes are moist   Eyes: EOM are normal.   CV: regular rate and rhythm; no murmurs, rubs or gallups  Chest: Effort normal and breath sounds normal.   GI:  There is no tenderness. No distension. Normal bowel sounds  MSK: Normal range of motion.   Neurological: Alert, attentive  Skin: Skin is warm and dry.  PSYCH:  Appearance:  awake, alert, adequately groomed, dressed in street clothes and appeared as age stated  Attitude:  cooperative  Eye Contact:  fair  Mood:  depressed  Affect:  appropriate and in normal range and mood congruent  Speech:  clear, coherent  Psychomotor Behavior:  no evidence of tardive dyskinesia, dystonia, or tics  Thought Process:  Unable to assess      Emergency Department Course     Results for orders placed or performed during the  hospital encounter of 12/29/22 (from the past 24 hour(s))   Alcohol level blood   Result Value Ref Range    Alcohol ethyl <0.01 <=0.01 g/dL   Basic metabolic panel (BMP)   Result Value Ref Range    Sodium 139 136 - 145 mmol/L    Potassium 3.7 3.4 - 5.3 mmol/L    Chloride 100 98 - 107 mmol/L    Carbon Dioxide (CO2) 25 22 - 29 mmol/L    Anion Gap 14 7 - 15 mmol/L    Urea Nitrogen 15.3 6.0 - 20.0 mg/dL    Creatinine 0.78 0.67 - 1.17 mg/dL    Calcium 9.5 8.6 - 10.0 mg/dL    Glucose 140 (H) 70 - 99 mg/dL    GFR Estimate >90 >60 mL/min/1.73m2   Urine Drugs of Abuse Screen    Narrative    The following orders were created for panel order Urine Drugs of Abuse Screen.  Procedure                               Abnormality         Status                     ---------                               -----------         ------                     Drug abuse screen 1 urin...[[id number]]  Normal              Final result                 Please view results for these tests on the individual orders.   Asymptomatic COVID-19 Virus (Coronavirus) by PCR Nasopharyngeal    Specimen: Nasopharyngeal; Swab   Result Value Ref Range    SARS CoV2 PCR Negative Negative    Narrative    Testing was performed using the Xpert Xpress SARS-CoV-2 Assay on the Cepheid Gene-Xpert Instrument Systems. Additional information about this Emergency Use Authorization (EUA) assay can be found via the Lab Guide. This test should be ordered for the detection of SARS-CoV-2 in individuals who meet SARS-CoV-2 clinical and/or epidemiological criteria as well as from individuals without symptoms or other reasons to suspect COVID-19. Test performance for asymptomatic patients has only been established in anterior nasal swab specimens. This test is for in vitro diagnostic use under the FDA EUA for laboratories certified under CLIA to perform high complexity testing. This test has not been FDA cleared or approved. A negative result does not rule out the presence of PCR  inhibitors in the specimen or target RNA concentration below the limit of detection for the assay. The possibility of a false negative should be considered if the patient's recent exposure or clinical presentation suggests COVID-19. This test was validated by the [facility]. This laboratory is certified under the Clinical Laboratory Improvement Amendments (CLIA) as qualified to perform high complexity laboratory testing.     Drug abuse screen 1 urine (ED)   Result Value Ref Range    Amphetamines Urine Screen Negative Screen Negative    Barbituates Urine Screen Negative Screen Negative    Benzodiazepine Urine Screen Negative Screen Negative    Cannabinoids Urine Screen Negative Screen Negative    Cocaine Urine Screen Negative Screen Negative    Opiates Urine Screen Negative Screen Negative             Emergency Department Course:    Reviewed:  I reviewed nursing notes, vitals, and past medical history    Assessments:   I obtained history and examined the patient as noted above.     Interventions:  Medications   OLANZapine zydis (zyPREXA) ODT tab 10 mg (has no administration in time range)   OLANZapine zydis (zyPREXA) ODT tab 5 mg (5 mg Oral Given 12/29/22 1649)        Disposition:  The patient will board in the emergency department pending bed placement. Care was signed out to Dr. Curiel.     Impression & Plan      Medical Decision Making:  This is a 52 year old male who presents with acute suicidal behavior.  He is not intoxicated. There is no metabolic acidosis to suggest toxic alcohol ingestion and ethanol level is negative.  The patient is medically cleared for further mental health care.  Given the patient's history of psychosis and his guarded history that he provides, I am concerned that he indeed is suicidal.  Patient handed off to my partner pending placement to a psychiatric bed.  We will restart Zydis as the patient has been on this previously but stopped taking  this.    Diagnosis:    ICD-10-CM    1. Suicidal ideation  R45.851       2. History of psychosis  Z86.59               Eben Carlson MD  12/29/22 8634

## 2022-12-30 NOTE — CONSULTS
Diagnostic Evaluation Consultation  Crisis Assessment    Patient was assessed: [employer]  Patient location: [facility] ED  Was a release of information signed: No. Reason: declined. No current providers. No family or friends.     Referral Data and Chief Complaint  Juan is a 52 year old, who uses he/him pronouns, and presents to the ED via EMS. Patient is referred to the ED by other: [email or web address] called [phone]. Patient is presenting to the ED for the following concerns: walking into a busy intersection.      Informed Consent and Assessment Methods     Patient is his own guardian. Writer met with patient and explained the crisis assessment process, including applicable information disclosures and limits to confidentiality, assessed understanding of the process, and obtained consent to proceed with the assessment. Patient was observed to be able to participate in the assessment as evidenced by oriented, coherent, able to participate. Assessment methods included conducting a formal interview with patient, review of medical records, collaboration with medical staff, and obtaining relevant collateral information from family and community providers when available..     Over the course of this crisis assessment provided reassurance, offered validation and engaged patient in problem solving and disposition planning. Patient's response to interventions was minimal.     Summary of Patient Situation  Patient arrived via EMS after being found at an intersection attempting to jump in front of traffic. Patient was waiting at the intersection and would run out in front of the cars as they started to move. He told triage that he hears voices and that the voices told him to go into the road.     Met with pt with . He presents with flat affect and appears guarded. He appears distracted with possible internal preoccupation and is unable or willing to provide a good history.     He admits that he was walking into traffic in an  intersection. He denies that he was attempting to end his life. When asked what he was trying to do he states 'I don't know how to explain to you' and is not able to expand on this at all, even when asked later.   He reports hearing constant voices that say 'offensive' things that are often racist. He denies that the voices told him to walk into traffic but when asked if he was feeling distressed by the voices leading to the incident he says 'yes.' He denies SI/HI/plan/intent. Denies hx suicide attempts. He does not seem to care if he is admitted or not and tells writer to decide. He is wiling to come in to a MH unit.      Brief Psychosocial History  Pt reports that he is single. He is from [place] and states he has lived in [place] for 10+ years. He has two daughters and they, along with all of his family, still live in [place]. He denies having any friends or family here in [place]. He is currently homeless and has been staying at a shelter in [place]. He is unemployed.     Significant Clinical History  Pt states that he does not know about his mental health history. When reminded of an inpatient MH admission in 2021, he does recall this. Per chart, pt was admitted to [facility] for inpatient MH in June 2021 for psychosis and was started on Zyprexa. When asked today, he reports that he took it until he ran out but then never got refills. He has no current providers.     Per chart, pt had reported substance use at previous admission with cannabis, cocaine and methamphetamine. He denies any recent use today.      Collateral Information  None available at this time.      Risk Assessment  Gonzales Suicide Severity Rating Scale Full Clinical Version: 12/29/22  Suicidal Ideation  1. Wish to be Dead (Lifetime): No  2. Non-Specific Active Suicidal Thoughts (Lifetime): No     Suicidal Behavior  Actual Attempt (Lifetime): No  Has subject engaged in non-suicidal self-injurious behavior? (Lifetime): Yes  Has subject engaged  in non-suicidal self-injurious behavior? (Past 3 Months): Yes  Interrupted Attempts (Lifetime): No  Aborted or Self-Interrupted Attempt (Lifetime): No  Preparatory Acts or Behavior (Lifetime): No  C-SSRS Risk (Lifetime/Recent)  Calculated C-SSRS Risk Score (Lifetime/Recent): No Risk Indicated    Ellington Suicide Severity Rating Scale Since Last Contact: n/a     Validity of evaluation is impacted by presenting factors during interview. Pt appears guarded and almost confused at times about recent events as well as his history. Engaged today in actions that could have resulted in injury and he is not able to explain these actions yet denies SI.    Comments regarding subjective versus objective responses to Ellington tool: see above.   Environmental or Psychosocial Events: unemployment/underemployment and unstable housing, homelessness  Chronic Risk Factors: history of psychiatric hospitalization   Warning Signs: seeking access to means to hurt or kill self and acting reckless or engaging in risky activities  Protective Factors: none identified  Interpretation of Risk Scoring, Risk Mitigation Interventions and Safety Plan:  Per scale pt is no risk; however, pt was very guarded and somewhat confused/disorganized. He was engaging in action today that could have resulted in harm and appeared to be suicidal in nature yet denies this. He is experiencing psychosis and is untreated.     Does the patient have thoughts of harming others? No  Is the patient engaging in sexually inappropriate behavior?  no      Current Substance Abuse  Is there recent substance abuse? no  Was a urine drug screen or blood alcohol level obtained: No       Mental Status Exam   Affect: Flat   Appearance: Appropriate    Attention Span/Concentration: Inattentive  Eye Contact: Avoidant   Fund of Knowledge: Delayed    Language /Speech Content: Fluent   Language /Speech Volume: Normal    Language /Speech Rate/Productions: Minimally Responsive and Normal     Recent Memory: Variable   Remote Memory: Variable   Mood: Apathetic and Normal    Orientation to Person: Yes    Orientation to Place: Yes   Orientation to Time of Day: Yes    Orientation to Date: Yes    Situation (Do they understand why they are here?): No    Psychomotor Behavior: Normal    Thought Content: Hallucinations   Thought Form: Intact      History of commitment: No     Medication  Psychotropic medications: No  Medication changes made in the last two weeks: No       Current Care Team  Primary Care Provider: No  Psychiatrist: No  Therapist: No  : No     CTSS or ARMHS: No  ACT Team: No  Other: No    Diagnosis  298.9 (F29)  Unspecified Schizophrenia Spectrum     Clinical Summary and Substantiation of Recommendations    Pt with history of psychosis per his report and per chart with one prior admission in 2021 where he was experiencing psychosis. He has not been receiving any mental health services since time. Today is providing unreliable history and is guarded but was engaging in behavior that could have resulted in serious injury and is not able to provide any clarity or reasoning for this. Is experiencing active auditory  hallucinations. Recommend inpatient  for safety and stabilization.   Admission to Inpatient Level of Care is indicated due to:    1. Patient risk of severity of behavioral health disorder is appropriate to proposed level of care as indicated by:    Imminent Risk of Harm: Very Recent suicide attempt or deliberate act of serious harm to self WITHOUT relief of factors precipitating the attempt or act  And/or:  Behavioral health disorder is present and appropriate for inpatient care with both of the following:     Severe psychiatric, behavioral or other comorbid conditions are appropriate for management at inpatient mental health as indicated by at least one of the following:   o Hallucinations; delusions; positive symptoms and Other psychiatric symptoms related to underlying  psychiatric disorder    Severe dysfunction in daily living is present as indicated by at least one of the following:   o Complete inability to maintain any appropriate aspect of personal responsibility in any adult roles and Other evidence of severe dysfunction    2. Inpatient mental health services are necessary to meet patient needs and at least one of the following:  Specific condition related to admission diagnosis is present and judged likely to further improve at proposed level of care and Specific condition related to admission diagnosis is present and judged likely to deteriorate in absence of treatment at proposed level of care    3. Situation and expectations are appropriate for inpatient care, as indicated by one of the following:   Patient management/treatment at lower level of care is not feasible or is inappropriate    Disposition  Recommended disposition: Inpatient Mental Health       Reviewed case and recommendations with attending provider. Attending Name: Eben Carlson MD       Attending concurs with disposition: Yes       Patient concurs with disposition: Yes       Guardian concurs with disposition: NA      Final disposition: Inpatient mental health .     Inpatient Details (if applicable):   Is patient admitted voluntarily:Yes      Patient aware of potential for transfer if there is not appropriate placement? Yes       Patient is willing to travel outside of the [facility] for placement? Yes      Behavioral Intake Notified? Yes: Date: 12/29/22 Time: 830 pm.       Assessment Details  Patient interview started at: 710 pm and completed at: 740 pm.     Total duration spent on the patient case in minutes: .50 hrs      CPT code(s) utilized: 94345 - Psychotherapy for Crisis - 60 (30-74*) min       Jenny Vincent, UMBERTO, MSW, LICSW, Psychotherapist  [facility] - Triage & Transition Services  Callback: [phone]

## 2022-12-30 NOTE — TELEPHONE ENCOUNTER
R: 7PM- Bed Search update         [facility]: posting 0 beds    [facility]: posting 0 beds    [facility]: posting 0 beds    [facility]:  posting 0 beds    [facility]: posting 0 beds

## 2022-12-30 NOTE — TELEPHONE ENCOUNTER
S: [facility] ED , DEC  Erika calling at 8:20PM  about a 52 year old/Male presenting with AH, dangerous behavior walking into traffic        B: Pt arrived via EMS. Presenting problem, stressors: Pt was found at intersection attempting to jump into traffic.  Medics said he was running in front of cars.  Pt told triage that voices say offensive things to him and were telling him to go into the road.      Pt affect in ED: Flat  Pt Dx: Unspecified psychosis 2021  Previous IPMH hx? Yes: 2021 [facility]    Pt denies SI   Hx of suicide attempt? No  Pt denies SIB  Pt denies HI   Pt endorses hearing voices constantly.  Pt reports voices say .  He reports the voices were telling him to walk into the road in front of traffic    Hx of aggression/violence, sexual offences, legal concerns, or Epic care plan? describe: No  Current concerns for aggression this visit? No  Does pt have a history of Civil Commitment? No  Is Pt their own guardian? Yes    Pt is not prescribed medication. Is patient medication compliant? N/A  Pt denies OP services   CD concerns: None  Acute or chronic medical concerns: No  Does Pt present with specific needs, assistive devices, or exclusionary criteria? None      Pt is ambulatory  Pt is able to perform ADLs independently      A: Pt to be reviewed for [facility] admission. Pt is Voluntary  Preferred placement: [facility]    COVID:Ordered, not yet collected  Utox: Ordered, not yet collected   CMP: N/A  CBC: N/A  HCG: N/A    R: Patient cleared and ready for behavioral bed placement: Yes  Pt placed on [facility] worklist? Yes

## 2022-12-30 NOTE — ED NOTES
Patient cooperative throughout shift. Patient ate 2 meal trays and is resting in room. Sitter in room.

## 2022-12-30 NOTE — ED NOTES
"Triage & Transition Services, Extended Care     Juan Ordoñez  December 30, 2022    Juan is followed related to Long wait time for admission: 18+ hours . Please see initial DEC Crisis Assessment completed for complete assessment information. Medical record is reviewed. While patient is in the ED, care team is working towards Learn and Demonstrate at Least One Skill Focused on Crisis Stabilization. Notable concerns include: SI with command hallucinations, Patient was brought in by EMS after seen walking into moving traffic in an intersection. Providing limited history of report of events; denying SI but is not able to explain actions. Is reporting hearing constant voices and told triage that the voices told him to walk into traffic.     10:25am- writer attempted to meet with patient, patient is sleeping, sitter reports patient has been appropriate, sleeping for the most part, appears fatigued.  Writer will attempt again to meet with patient at a later time.    12:17pm- writer returned, was able to wake patient with verbal prompts, spoke to patient via .  Patient is sitting up on the gurney, he is awake and alert, though continues to present as fatigued.  Reviewed recommended plan of care with patient, he states he understands and is agreeable to plan.  Writer asked patient to recall events prior to arriving to the ED, patient said the police picked him up when he was trying to cross an intersection.  Writer asked patient if he was walking into traffic intentionally to harm himself, he replied yes.  Writer asked patient if he is experiencing AH, he responded yes and identified he is hearing voices.  Writer asked the patient if the voices were telling him to do anything, he said \"yes the voices were telling me to walk into the cars\".  Patient endorses SI in the context of command AH.  Patient is agreeable to IP MH admission.  Writer explained to the patient he will remain in the ED until " placement is identified, and then he will be transported, ensured patient he would be updated throughout the stay in the ED.  Patient expressed understanding of the plan, he was clam and appropriate    There are not significant status changes.     Recommend to continue care coordination with Central Intake for IP MH placement.      Plan:  Inpatient Mental Health: Patient presents with a history of psychosis, per his report and per chart review he has one prior admission in 2021.  Patient has not been receiving any mental health services since that time. Upon assessment patient was  unreliable and guarded, during meeting today he was calm and appropriate. Prior to arrival patient was engaging in behavior that could have resulted in serious injury and is not able to provide any clarity or reasoning for this. Patient reports experiencing active auditory hallucinations. Recommend inpatient MH for safety and stabilization.     Plan for Care reviewed with Assigned Medical Provider? Yes. Provider, Michael Michael MD, response: concurs with plan     Extended Care will follow and meet with patient/family/care team as able or requested.     Olga Anguiano, [facility], Extended Care   [phone]

## 2022-12-31 PROBLEM — F25.9 SCHIZOPHRENIA, SCHIZOAFFECTIVE, CHRONIC WITH ACUTE EXACERBATION (H): Status: ACTIVE | Noted: 2022-12-31

## 2022-12-31 PROCEDURE — 124N000003 HC R&B MH SENIOR/ADOLESCENT

## 2022-12-31 PROCEDURE — 250N000013 HC RX MED GY IP 250 OP 250 PS 637: Performed by: PSYCHIATRY & NEUROLOGY

## 2022-12-31 PROCEDURE — 99222 1ST HOSP IP/OBS MODERATE 55: CPT | Mod: AI | Performed by: PSYCHIATRY & NEUROLOGY

## 2022-12-31 RX ADMIN — OLANZAPINE 10 MG: 10 TABLET, ORALLY DISINTEGRATING ORAL at 20:11

## 2022-12-31 RX ADMIN — OLANZAPINE 10 MG: 10 TABLET, ORALLY DISINTEGRATING ORAL at 08:47

## 2022-12-31 ASSESSMENT — ACTIVITIES OF DAILY LIVING (ADL)
ADLS_ACUITY_SCORE: 31
DRESS: INDEPENDENT
ADLS_ACUITY_SCORE: 31
ORAL_HYGIENE: INDEPENDENT
ADLS_ACUITY_SCORE: 31
ADLS_ACUITY_SCORE: 31
HYGIENE/GROOMING: INDEPENDENT
ADLS_ACUITY_SCORE: 31

## 2022-12-31 NOTE — PLAN OF CARE
"  Problem: Psychotic Signs/Symptoms  Goal: Decreased Sensory Symptoms (Psychotic Signs/Symptoms)  Outcome: Progressing  Flowsheets (Taken 12/31/2022 1415)  Mutually Determined Action Steps (Decreased Sensory Symptoms):   adheres to medication regimen   shares insight re: need for meds   Goal Outcome Evaluation:    Plan of Care Reviewed With: patient                 Patient calm and cooperative, pleasant upon approach. Sad/depressed mood with flat affect.   Denied anxiety. Reported depression \"low\". Denied SI \"No at the moment\". Continued to experience auditory hallucinations. Patient described them as constant voices. Patient stated that today they are less persistent and he can't understand what the voices are saying.   Appetite and sleep are good. Patient was visible in the milieu, watching TV and putting puzzle.   "

## 2022-12-31 NOTE — H&P
Chief Complaint:     Voices      HPI:       52 male who has been staying in shelters. He has at least one prior mental health admission and was admitted to [facility] in June of 2021 for similar presentation. He has more than one medical record due to inconsistent spelling of his last name ( Gurdeep vs Tiago) and confusion over whether Robbie is middle name or last name.    Patient was brought to hospital by police due to wandering in traffic. Patient suffers from chronic hallucinations, and has not been taking medications for at least several months.     Patient is cooperative to interview. He reports that auditory hallucinations have diminished since resuming Zyprexa in ER. He denies specific suicidal intent. He denies homicidal thoughts. He can not give substantial details about prior mental health treatment.    According to ER report by   Eben Carlson MD  12/29/22 2212  Juan Ordoñez is a 52 year old male with apparent history of psychosis who presents for evaluation after abnormal behavior this evening.  The patient was reportedly in street attempting to run in front of cars.  He was guarded with police who picked him up and brought him here but indicated to an ED RN that he is having voices that are telling him to hurt himself including to run in front of cars.  In conversations with me, the patient denies any concerns but is evasive regarding why he was in the street.  Medical Decision Making:  This is a 52 year old male who presents with acute suicidal behavior.  He is not intoxicated. There is no metabolic acidosis to suggest toxic alcohol ingestion and ethanol level is negative.  The patient is medically cleared for further mental health care.  Given the patient's history of psychosis and his guarded history that he provides, I am concerned that he indeed is suicidal.  Patient handed off to my partner pending placement to a psychiatric bed.  We will restart Zydis as the patient has  been on this previously but stopped taking this.      According to DEC assessment done in ER by Jenny Vincent, LICSW, MSW, LICSW, Psychotherapist  DEC - Triage & Transition Services  Summary of Patient Situation  Patient arrived via EMS after being found at an intersection attempting to jump in front of traffic. Patient was waiting at the intersection and would run out in front of the cars as they started to move. He told triage that he hears voices and that the voices told him to go into the road.   Met with pt with . He presents with flat affect and appears guarded. He appears distracted with possible internal preoccupation and is unable or willing to provide a good history.   He admits that he was walking into traffic in an intersection. He denies that he was attempting to end his life. When asked what he was trying to do he states 'I don't know how to explain to you' and is not able to expand on this at all, even when asked later.   He reports hearing constant voices that say 'offensive' things that are often racist. He denies that the voices told him to walk into traffic but when asked if he was feeling distressed by the voices leading to the incident he says 'yes.' He denies SI/HI/plan/intent. Denies hx suicide attempts. He does not seem to care if he is admitted or not and tells writer to decide. He is wiling to come in to a MH unit.    Brief Psychosocial History  Pt reports that he is single. He is from [place] and states he has lived in [place] for 10+ years. He has two daughters and they, along with all of his family, still live in [place]. He denies having any friends or family here in [place]. He is currently homeless and has been staying at a shelter in [place]. He is unemployed.   Significant Clinical History  Pt states that he does not know about his mental health history. When reminded of an inpatient MH admission in 2021, he does recall this. Per chart, pt was [facility] to [facility] for [facility]  in June 2021 for psychosis and was started on Zyprexa. When asked today, he reports that he took it until he ran out but then never got refills. He has no current providers.   Per chart, pt had reported substance use at previous admission with cannabis, cocaine and methamphetamine. He denies any recent use today.               Past Psychiatric History:     At least one prior admission in June of 2021        Substance Use and History:     Denies alcohol or drug use        Past Medical History:   PAST MEDICAL HISTORY: No past medical history on file.  Denies all medical problems  PAST SURGICAL HISTORY: No past surgical history on file.          Family History:   FAMILY HISTORY: No family history on file.  Denies family history of mental illness      Social History:   Please see the full psychosocial profile from the clinical treatment coordinator.   SOCIAL HISTORY:   Social History     Tobacco Use     Smoking status: Not on file     Smokeless tobacco: Not on file   Substance Use Topics     Alcohol use: Not on file     Patient reports that his family lives in [place] but he has not been able to communicate with them lately. He has 2 adult daughters in [place].          PTA Medications:     Medications Prior to Admission   Medication Sig Dispense Refill Last Dose     ibuprofen (ADVIL/MOTRIN) 600 MG tablet Take 600 mg by mouth every 6 hours as needed for moderate pain (4-6) or fever        LORazepam (ATIVAN) 1 MG tablet Take 1 mg by mouth every 8 hours as needed for anxiety        melatonin 3 MG CAPS Take by mouth nightly as needed (sleep)        OLANZapine zydis (ZYPREXA) 10 MG ODT Take 10 mg by mouth 2 times daily   12/30/2022 at 1102            Current Medications:       OLANZapine zydis  10 mg Oral BID     acetaminophen, alum & mag hydroxide-simethicone, hydrOXYzine, OLANZapine **OR** OLANZapine, senna-docusate, traZODone         Allergies:   No Known Allergies       Labs:     Recent Results  "(from the past 48 hour(s))   Alcohol level blood    Collection Time: 12/29/22  5:11 PM   Result Value Ref Range    Alcohol ethyl <0.01 <=0.01 g/dL   Basic metabolic panel (BMP)    Collection Time: 12/29/22  5:11 PM   Result Value Ref Range    Sodium 139 136 - 145 mmol/L    Potassium 3.7 3.4 - 5.3 mmol/L    Chloride 100 98 - 107 mmol/L    Carbon Dioxide (CO2) 25 22 - 29 mmol/L    Anion Gap 14 7 - 15 mmol/L    Urea Nitrogen 15.3 6.0 - 20.0 mg/dL    Creatinine 0.78 0.67 - 1.17 mg/dL    Calcium 9.5 8.6 - 10.0 mg/dL    Glucose 140 (H) 70 - 99 mg/dL    GFR Estimate >90 >60 mL/min/1.73m2   Asymptomatic COVID-19 Virus (Coronavirus) by PCR Nasopharyngeal    Collection Time: 12/29/22  8:45 PM    Specimen: Nasopharyngeal; Swab   Result Value Ref Range    SARS CoV2 PCR Negative Negative   Drug abuse screen 1 urine (ED)    Collection Time: 12/29/22  8:45 PM   Result Value Ref Range    Amphetamines Urine Screen Negative Screen Negative    Barbituates Urine Screen Negative Screen Negative    Benzodiazepine Urine Screen Negative Screen Negative    Cannabinoids Urine Screen Negative Screen Negative    Cocaine Urine Screen Negative Screen Negative    Opiates Urine Screen Negative Screen Negative          Physical Exam:     /72   Pulse 66   Temp 97.2  F (36.2  C)   Ht 1.626 m (5' 4\")   Wt 71.8 kg (158 lb 4.6 oz)   SpO2 99%   BMI 27.17 kg/m    Weight is 158 lbs 4.64 oz  Body mass index is 27.17 kg/m .    Physical Exam:  Gen: No acute distress  HEENT: EOMI, no nystagmus or scleral icterus, moist mucous membranes  Skin: No diaphoresis or rash           Physical ROS:   The remainder of 10-point review of systems was negative except as noted in HPI.             Mental Status Exam:     Mental Status  Patient is casually dressed  Hygiene good  Speech fluent  Thought Process concrete  Thought Content:  + suicidal ideation,    No homicidal ideation,   No ideas of reference,    No loose associations,    + auditory hallucinations, "     No visual hallucinations   No delusions  Psychomotor: + slowing  Cognition:  Alert and oriented to time place and person  Attention good  Concentration good  Memory normal including recent and remote memory  Mood:  euthymic  Affect: mood congruent  Judgement: normal  Eye contact good  Cooperation good  Language normal  Fund of knowledge normal  Musculoskeletal normal gait with no abnormal movements         Diagnoses:   Schizophrenia, schizoaffective, chronic with acute exacerbation (H)    Patient Active Problem List   Diagnosis     Schizophrenia, schizoaffective, chronic with acute exacerbation (H)              Assessment:     Patient with schizoaffective disorder and homelessness presents with worsening hallucinations and dangerous behavior in the context of no current medication use.         Plan:     Legal:72 hour hold    Medication:    OLANZapine zydis  10 mg Oral BID     Will use Zyprexa for psychosis and adjust dose as indicated      Consults: Hospitalist will be consulted if medical issues arise      Multidisciplinary Interventions:  to gather collateral information, coordinate care with outpatient providers and begin follow up planning      Disposition: assess options for placement        More than 60 minutes spent on this visit with more than 50% time spent on coordination of care with staff, reviewing medical record, psychoeducation, providing supportive therapy regarding coping with chronic mental illness, entering orders and preparing documentation for the visit    Vasquez Ray MD    Initial Certification I certify that the inpatient psychiatric facility admission was medically necessary for treatment which could reasonably be expected to improve the patient s condition.        I estimate 7 days of hospitalization is necessary for proper treatment of the patient. My plans for post-hospital care this patient are assess placement options     Vasquez Ray MD     -      12/31/2022     -

## 2022-12-31 NOTE — PLAN OF CARE
"ADMISSION:         52 year old Maltese speaking male admitted on 72 hour hold from [facility] ED. Per DEC assessment:    \"Summary of Patient Situation  Patient arrived via EMS after being found at an intersection attempting to jump in front of traffic. Patient was waiting at the intersection and would run out in front of the cars as they started to move. He told triage that he hears voices and that the voices told him to go into the road. Met with pt with . He presents with flat affect and appears guarded. He appears distracted with possible internal preoccupation and is unable or willing to provide a good history. He admits that he was walking into traffic in an intersection. He denies that he was attempting to end his life. When asked what he was trying to do he states 'I don't know how to explain to you' and is not able to expand on this at all, even when asked later. He reports hearing constant voices that say 'offensive' things that are often racist. He denies that the voices told him to walk into traffic but when asked if he was feeling distressed by the voices leading to the incident he says 'yes.' He denies SI/HI/plan/intent. Denies hx suicide attempts. He does not seem to care if he is admitted or not and tells writer to decide. He is wiling to come in to a MH unit.       Brief Psychosocial History  Pt reports that he is single. He is from [place] and states he has lived in [place] for 10+ years. He has two daughters and they, along with all of his family, still live in [place]. He denies having any friends or family here in [place]. He is currently homeless and has been staying at a shelter in [place]. He is unemployed.      Significant Clinical History  Pt states that he does not know about his mental health history. When reminded of an inpatient MH admission in 2021, he does recall this. Per chart, pt was admitted to [facility] for inpatient MH in June 2021 for psychosis and was started on " "Zyprexa. When asked today, he reports that he took it until he ran out but then never got refills. He has no current providers. Per chart, pt had reported substance use at previous admission with cannabis, cocaine and methamphetamine. He denies any recent use today.\"    Calm, cooperative on arrival. Eye contact appropriate. Affect blunted. Appears guarded. Admission interview conducted with Azerbaij[place]  on telephone. At times when asked questions, pt pauses for long periods of time, either to formulate a response or perhaps attending to internal stimuli. Reports hearing a male voice which he is afraid of. States this began about 3 years ago. Admits to walking into traffic but continues to deny this behavior was a result of suicidal ideation. \"I can't explain it.\" Poor historian: unable to recall prior mental health hospitalizations or neuroleptic medication trials. Reports having fallen on ice once in past few months but denies any balance issues. Showed writer patches of dry skin on arms and legs which he is concerned about and which he would like IM to assess for him tomorrow. Given skin lotion for tonight.  Med-compliant.     Safety search completed. Oriented to unit, room. Vitals assessed. Care plan initiated. Status 15 initiated. Suicide precautions initiated. Patient profile incomplete (pt requesting more information about advanced care directives; awaiting Azerbaijani language materials).  scheduled beginning tomorrow.                "

## 2022-12-31 NOTE — PLAN OF CARE
12/30/22 2216   Patient Belongings   Did you bring any home meds/supplements to the hospital?  No   Patient Belongings locker   Patient Belongings Put in Hospital Secure Location (Security or Locker, etc.) clothing;shoes   Belongings Search Yes   Clothing Search Yes   Second Staff Vitor S   Comment 3 Pay checks     Storage bin: 2 pants, 1 top, winter hat, belt, hoodie, boots, gloves, backpack, winter coat.     Security Envelope [id number] ( 3 Pay checks: Checks # [id number] [id number] [id number]) Community Card.     A               Admission:  I am responsible for any personal items that are not sent to the safe or pharmacy.  [facility] is not responsible for loss, theft or damage of any property in my possession.    Signature:  _________________________________ Date: _______  Time: _____                                              Staff Signature:  ____________________________ Date: ________  Time: _____      2nd Staff person, if patient is unable/unwilling to sign:    Signature: ________________________________ Date: ________  Time: _____     Discharge:  [facility] has returned all of my personal belongings:    Signature: _________________________________ Date: ________  Time: _____                                          Staff Signature:  ____________________________ Date: ________  Time: _____

## 2022-12-31 NOTE — PROGRESS NOTES
Pt appeared to be sleeping a total of about hours 8.75 hours. Pt is on SI precaution. No concerns reported or noted this shift. Will continue to monitor.

## 2022-12-31 NOTE — PHARMACY-ADMISSION MEDICATION HISTORY
Med history obtained by pharmacy at [facility]. See note dated 12/30/2022 by Eben Cisneros for details.

## 2023-01-01 ENCOUNTER — APPOINTMENT (OUTPATIENT)
Dept: INTERPRETER SERVICES | Facility: CLINIC | Age: 53
End: 2023-01-01
Attending: PSYCHIATRY & NEUROLOGY
Payer: MEDICAID

## 2023-01-01 PROCEDURE — 250N000013 HC RX MED GY IP 250 OP 250 PS 637: Performed by: PSYCHIATRY & NEUROLOGY

## 2023-01-01 PROCEDURE — 124N000003 HC R&B MH SENIOR/ADOLESCENT

## 2023-01-01 PROCEDURE — 99231 SBSQ HOSP IP/OBS SF/LOW 25: CPT | Performed by: PSYCHIATRY & NEUROLOGY

## 2023-01-01 RX ORDER — OLANZAPINE 10 MG/1
10 TABLET ORAL AT BEDTIME
Status: DISCONTINUED | OUTPATIENT
Start: 2023-01-01 | End: 2023-01-09

## 2023-01-01 RX ORDER — IBUPROFEN 600 MG/1
600 TABLET, FILM COATED ORAL EVERY 6 HOURS PRN
Status: DISCONTINUED | OUTPATIENT
Start: 2023-01-01 | End: 2023-03-01 | Stop reason: HOSPADM

## 2023-01-01 RX ORDER — LORAZEPAM 0.5 MG/1
1 TABLET ORAL EVERY 8 HOURS PRN
Status: DISCONTINUED | OUTPATIENT
Start: 2023-01-01 | End: 2023-01-01

## 2023-01-01 RX ORDER — OLANZAPINE 10 MG/1
10 TABLET, ORALLY DISINTEGRATING ORAL 2 TIMES DAILY
Status: DISCONTINUED | OUTPATIENT
Start: 2023-01-01 | End: 2023-01-01

## 2023-01-01 RX ADMIN — OLANZAPINE 10 MG: 10 TABLET, FILM COATED ORAL at 20:33

## 2023-01-01 RX ADMIN — OLANZAPINE 10 MG: 10 TABLET, ORALLY DISINTEGRATING ORAL at 08:34

## 2023-01-01 ASSESSMENT — ACTIVITIES OF DAILY LIVING (ADL)
ADLS_ACUITY_SCORE: 31
DRESS: INDEPENDENT
ADLS_ACUITY_SCORE: 31
HYGIENE/GROOMING: INDEPENDENT
ADLS_ACUITY_SCORE: 31
ORAL_HYGIENE: INDEPENDENT
ADLS_ACUITY_SCORE: 31

## 2023-01-01 NOTE — PROGRESS NOTES
"    Interim History:     Patient seen and chart reviewed.       According to Nursing notes:    Patient calm and cooperative, pleasant upon approach. Sad/depressed mood with flat affect.   Denied anxiety. Reported depression \"low\". Denied SI \"No at the moment\". Continued to experience auditory hallucinations. Patient described them as constant voices. Patient stated that today they are less persistent and he can't understand what the voices are saying.   Appetite and sleep are good. Patient was visible in the milieu, watching TV and putting puzzle.   Pt brighter this evening. Good eye contact. Smiles on approach. Appears more comfortable on unit. Reported feeling \"calm.\" Denied depressed mood or anxiety. Reported decrease in intensity of auditory hallucinations. No command hallucinations. Denied SI/SIB. Med-compliant. Pleasant, cooperative. Continue with current treatment plan and recommendations. Continue to monitor and reassess symptoms. Monitor response to medications. Monitor progress towards treatment goals. Encourage groups and participation.          On interview today:Patient denies suicidal or homicidal thoughts but continues to have hallucinations. Patient denies side effects to medications.  Patient is otherwise calm and cheerful.      ROS:    Vital signs:  Temp: 97.8  F (36.6  C) Temp src: Oral BP: 113/77 Pulse: 84   Resp: 14 SpO2: 100 % O2 Device: None (Room air)   Height: 162.6 cm (5' 4\") Weight: 71.4 kg (157 lb 6.5 oz)  Estimated body mass index is 27.02 kg/m  as calculated from the following:    Height as of this encounter: 1.626 m (5' 4\").    Weight as of this encounter: 71.4 kg (157 lb 6.5 oz).         MSE:  Mental Status  Patient is casually dressed  Hygiene good  Speech fluent  Thought Process concrete  Thought Content:  less suicidal ideation,    No homicidal ideation,   No ideas of reference,    No loose associations,    + auditory hallucinations,     No visual hallucinations   No " delusions  Psychomotor: + slowing  Cognition:  Alert and oriented to time place and person  Attention good  Concentration good  Memory normal including recent and remote memory  Mood:  euthymic  Affect: mood congruent  Judgement: normal  Eye contact good  Cooperation good  Language normal  Fund of knowledge normal  Musculoskeletal normal gait with no abnormal movements    Minimal change in mental status in the past 24 hours        HPI:   From initial H and P  52 male who has been staying in shelters. He has at least one prior mental health admission and was admitted to [facility] in June of 2021 for similar presentation. He has more than one medical record due to inconsistent spelling of his last name ( Gurdeep vs Tiago) and confusion over whether Robbie is middle name or last name.    Patient was brought to hospital by police due to wandering in traffic. Patient suffers from chronic hallucinations, and has not been taking medications for at least several months.     Patient is cooperative to interview. He reports that auditory hallucinations have diminished since resuming Zyprexa in ER. He denies specific suicidal intent. He denies homicidal thoughts. He can not give substantial details about prior mental health treatment.    According to ER report by   Eben Carlson MD  12/29/22 2212  Juan Ordoñez is a 52 year old male with apparent history of psychosis who presents for evaluation after abnormal behavior this evening.  The patient was reportedly in street attempting to run in front of cars.  He was guarded with police who picked him up and brought him here but indicated to an ED RN that he is having voices that are telling him to hurt himself including to run in front of cars.  In conversations with me, the patient denies any concerns but is evasive regarding why he was in the street.  Medical Decision Making:  This is a 52 year old male who presents with acute suicidal behavior.  He is not  intoxicated. There is no metabolic acidosis to suggest toxic alcohol ingestion and ethanol level is negative.  The patient is medically cleared for further mental health care.  Given the patient's history of psychosis and his guarded history that he provides, I am concerned that he indeed is suicidal.  Patient handed off to my partner pending placement to a psychiatric bed.  We will restart Zydis as the patient has been on this previously but stopped taking this.      According to [facility] assessment done in ER by Jenny Vincent, LICSW, MSW, LICSW, Psychotherapist  [facility] - Triage & Transition Services  Summary of Patient Situation  Patient arrived via EMS after being found at an intersection attempting to jump in front of traffic. Patient was waiting at the intersection and would run out in front of the cars as they started to move. He told triage that he hears voices and that the voices told him to go into the road.   Met with pt with . He presents with flat affect and appears guarded. He appears distracted with possible internal preoccupation and is unable or willing to provide a good history.   He admits that he was walking into traffic in an intersection. He denies that he was attempting to end his life. When asked what he was trying to do he states 'I don't know how to explain to you' and is not able to expand on this at all, even when asked later.   He reports hearing constant voices that say 'offensive' things that are often racist. He denies that the voices told him to walk into traffic but when asked if he was feeling distressed by the voices leading to the incident he says 'yes.' He denies SI/HI/plan/intent. Denies hx suicide attempts. He does not seem to care if he is admitted or not and tells writer to decide. He is wiling to come in to a MH unit.    Brief Psychosocial History  Pt reports that he is single. He is from [place] and states he has lived in [place] for 10+ years. He has two daughters  and they, along with all of his family, still live in [place]. He denies having any friends or family here in [place]. He is currently homeless and has been staying at a shelter in [place]. He is unemployed.   Significant Clinical History  Pt states that he does not know about his mental health history. When reminded of an inpatient MH admission in 2021, he does recall this. Per chart, pt was admitted to [facility] for inpatient MH in June 2021 for psychosis and was started on Zyprexa. When asked today, he reports that he took it until he ran out but then never got refills. He has no current providers.   Per chart, pt had reported substance use at previous admission with cannabis, cocaine and methamphetamine. He denies any recent use today.               Past Psychiatric History:     At least one prior admission in June of 2021        Substance Use and History:     Denies alcohol or drug use  .         Current Medications:       OLANZapine zydis  10 mg Oral BID     acetaminophen, alum & mag hydroxide-simethicone, hydrOXYzine, OLANZapine **OR** OLANZapine, senna-docusate, traZODone         Allergies:   No Known Allergies       Labs:     No results found for this or any previous visit (from the past 48 hour(s)).             Diagnoses:   Schizophrenia, schizoaffective, chronic with acute exacerbation (H)    Patient Active Problem List   Diagnosis     Schizophrenia, schizoaffective, chronic with acute exacerbation (H)              Assessment:     Patient with schizoaffective disorder and homelessness presents with worsening hallucinations and dangerous behavior in the context of no current medication use. He continues to have psychosis         Plan:     Legal:72 hour hold    Medication:    OLANZapine zydis  10 mg Oral BID     Will use Zyprexa for psychosis and adjust dose as indicated    Consults: Hospitalist will be consulted if medical issues arise    Multidisciplinary Interventions:  to gather  collateral information, coordinate care with outpatient providers and begin follow up planning      Disposition: assess options for placement    No further change in treatment plan        Vasquez Ray MD

## 2023-01-01 NOTE — PLAN OF CARE
"Goal Outcome Evaluation:    Plan of Care Reviewed With: patient          Pt calm, pleasant, cooperative. Eye contact appropriate. Affect restricted; smiles a little on approach. Reports moderate symptoms of depression and anxiety. Rates severity of both at 5/10. Denies SI/HI/SIB. Continues to report AH which fluctuate in intensity and frequency throughout the day. Denies they command him to harm himself or others, but one tells him \"not to be in a certain place.\" Continues to pause at times while discussing symptoms as if thinking about how to describe them, or possibly attending to internal stimuli. Med-compliant. Hygiene, appetite, sleep good. Continue with current treatment plan and recommendations. Continue to monitor and reassess symptoms. Monitor response to medications. Monitor progress towards treatment goals. Encourage groups and participation.          "

## 2023-01-01 NOTE — PLAN OF CARE
"  Problem: Psychotic Signs/Symptoms  Goal: Increased Participation and Engagement (Psychotic Signs/Symptoms)  Outcome: Progressing   Goal Outcome Evaluation:         Patient calm, cooperative and pleasant upon approach. He stated that feels \"ok\" but also worried by the AH- voices. He stated that today he can hear them more clearly and that they are telling him to \"hurt\" himself and also:  \"They don't want me to be in a stable place\", \"They are offensive, using bad words, cursing at me and my family\". Patient stated that generally he feels better than prior to his admission and that the treatment has been helpful so far.   He contracts for safety. Attended group therapy. Seen working [facility] a puzzle in the [facility]. Appetite and sleep are good.                "

## 2023-01-01 NOTE — PLAN OF CARE
Goal Outcome Evaluation:       Patient slept soundly for 8.75 hours the whole night without interruptions. No behavioral issues observed.

## 2023-01-01 NOTE — PLAN OF CARE
"Goal Outcome Evaluation:    Plan of Care Reviewed With: patient          Pt brighter this evening. Good eye contact. Smiles on approach. Appears more comfortable on unit. Reported feeling \"calm.\" Denied depressed mood or anxiety. Reported decrease in intensity of auditory hallucinations. No command hallucinations. Denied SI/SIB. Med-compliant. Pleasant, cooperative. Continue with current treatment plan and recommendations. Continue to monitor and reassess symptoms. Monitor response to medications. Monitor progress towards treatment goals. Encourage groups and participation.          "

## 2023-01-02 PROCEDURE — 250N000013 HC RX MED GY IP 250 OP 250 PS 637: Performed by: PSYCHIATRY & NEUROLOGY

## 2023-01-02 PROCEDURE — 99231 SBSQ HOSP IP/OBS SF/LOW 25: CPT | Performed by: PSYCHIATRY & NEUROLOGY

## 2023-01-02 PROCEDURE — G0177 OPPS/PHP; TRAIN & EDUC SERV: HCPCS

## 2023-01-02 PROCEDURE — 124N000003 HC R&B MH SENIOR/ADOLESCENT

## 2023-01-02 RX ADMIN — OLANZAPINE 10 MG: 10 TABLET, FILM COATED ORAL at 20:30

## 2023-01-02 ASSESSMENT — ACTIVITIES OF DAILY LIVING (ADL)
DRESS: INDEPENDENT
ADLS_ACUITY_SCORE: 31
ADLS_ACUITY_SCORE: 31
HYGIENE/GROOMING: INDEPENDENT
DRESS: INDEPENDENT
ORAL_HYGIENE: INDEPENDENT
ADLS_ACUITY_SCORE: 31
ORAL_HYGIENE: INDEPENDENT
ADLS_ACUITY_SCORE: 31
ADLS_ACUITY_SCORE: 31
HYGIENE/GROOMING: INDEPENDENT
ADLS_ACUITY_SCORE: 31

## 2023-01-02 NOTE — PLAN OF CARE
Goal Outcome Evaluation:        Patient slept well the whole night for 13 hours without interruptions. No complaints made. Nothing unusual noted nor behavioral issues observed.

## 2023-01-02 NOTE — PLAN OF CARE
"   01/02/23 1500   General Information   Date Initially Attended OT 01/02/23   Clinical Impression   Affect Appropriate to situation;Flat   Orientation Oriented to person, place and time   Appearance and ADLs General cleanliness observed in most areas   Attention to Internal Stimuli No observed signs   Interaction Skills Other (see comments)  (needing an  limited his verbal communication)   Ability to Communicate Needs Does so with prompts   Verbal Content Appropriate to topic;Needs further assessment   Ability to Maintain Boundaries Maintains appropriate physical boundaries;Maintains appropriate verbal boundaries   Participation Independently participates   Concentration Concentrates 30+ minutes   Ability to Concentrate With structure   Follows and Comprehends Directions Independently follows 1 step verbal directions   Memory Needs further assessment   Organization Independently organizes medium tasks   Decision Making Independent   Planning and Problem Solving Independently plans ahead   Ability to Apply and Learn Concepts Applies within group structure   Frustrations / Stress Tolerance Independently identifies sources of frustration/stress   Level of Insight Identifies needs with structure/support;Needs further assessment   Self Esteem Other (see comments)  (\"don't know\" any personal strengths)   Social Supports Unable to identify any supports   General Observation/Plan   General Observations/Plan See Comments   [facility] OT Care Plan Goals   OT Care Plan coping with symptoms     Attended with an . Stated reason for admission as \"they found me crossing streets back and forth acting careless\". He stated changes he hopes for at time of admission as \"bad tempter and make right decisions\". OT goals chosen to focus on was improve organization. Plan:  Encourage attendance. Offer opportunity for success oriented, more structured task work, assess organization and planning further. Provide the opportunity " to be involved with attendance and reassurance with task focus. Assess and document.

## 2023-01-02 NOTE — PLAN OF CARE
"  Problem: Adult Behavioral Health Plan of Care  Goal: Plan of Care Review  Outcome: Progressing  Flowsheets (Taken 1/2/2023 1600)  Patient Agreement with Plan of Care: agrees   Goal Outcome Evaluation:    Plan of Care Reviewed With: patient        Writer met with Pt in the presence of .   Pt presents with a flat/blunted affect, endorsed depression and rated at 3/10. Pt denies anxiety, SI/SIB/HI. Pt reports auditory hallucinations, voices telling him \"to do the opposite of what I do\". Pt agreeable to alert staff if voices command him to self harm or harm others\". Pt states he feels safe in the unit. Pt denies any other forms of hallucinations at this time. States his goal is to keep working on getting better. Pt reports going to groups and being active in addition to taking his medication has been helpful.   Pt was intermittently out in the milieu, socially withdrawn.   Pt denies any pain, problem with appetite or elimination. Pt took a shower this evening.     /86   Pulse 74   Temp 97.5  F (36.4  C) (Temporal)   Resp 16   Ht 1.626 m (5' 4\")   Wt 71.4 kg (157 lb 6.5 oz)   SpO2 94%   BMI 27.02 kg/m                 "

## 2023-01-02 NOTE — PLAN OF CARE
Problem: Psychotic Signs/Symptoms  Goal: Improved Psychomotor Symptoms (Psychotic Signs/Symptoms)  Intervention: Manage Psychomotor Movement  Recent Flowsheet Documentation  Taken 1/2/2023 1308 by Nola Swartz, RN  Activity (Behavioral Health): up ad jessica     Problem: Suicidal Behavior  Goal: Suicidal Behavior is Absent or Managed  Outcome: Progressing   Goal Outcome Evaluation:    Plan of Care Reviewed With: patient      Met with patient and interpretor mid morning. Pt rated depression and anxiety at 5/10. Pt denies SI/SIB. Pt does admit to auditory hallucinations. Pt reports that the tell him not to stay in one place. Pt reports that these have improved since admission.    Pt reports that  he is sleeping ok. Pt oriented to January and self. Pt thought the year as 2022. Pt unable to state the name of the city,  stated the state as [place] was unable to state the date or day of the week.    Pt asked when he would be going. Pt agreeable to stay and work with [facility] and provider to find him safe place to discharge to.

## 2023-01-02 NOTE — PROGRESS NOTES
"    Interim History:     Patient seen and chart reviewed. Case reviewed in multi-disciplinary treatment team.    According to Nursing report:  Patient has been calm and pleasant. He reports some depression. He still reports auditory hallucinations. He has adequate self care. He is visible on the unit and is pleasant.    According to Nursing notes:  Patient calm, cooperative and pleasant upon approach. He stated that feels \"ok\" but also worried by the AH- voices. He stated that today he can hear them more clearly and that they are telling him to \"hurt\" himself and also:  \"They don't want me to be in a stable place\", \"They are offensive, using bad words, cursing at me and my family\". Patient stated that generally he feels better than prior to his admission and that the treatment has been helpful so far.   He contracts for safety. Attended group therapy. Seen working on a puzzle in the lounge. Appetite and sleep are good.    Patient calm and cooperative, pleasant upon approach. Sad/depressed mood with flat affect.   Denied anxiety. Reported depression \"low\". Denied SI \"No at the moment\". Continued to experience auditory hallucinations. Patient described them as constant voices. Patient stated that today they are less persistent and he can't understand what the voices are saying.   Appetite and sleep are good. Patient was visible in the milieu, watching TV and putting puzzle.   Pt brighter this evening. Good eye contact. Smiles on approach. Appears more comfortable on unit. Reported feeling \"calm.\" Denied depressed mood or anxiety. Reported decrease in intensity of auditory hallucinations. No command hallucinations. Denied SI/SIB. Med-compliant. Pleasant, cooperative. Continue with current treatment plan and recommendations. Continue to monitor and reassess symptoms. Monitor response to medications. Monitor progress towards treatment goals. Encourage groups and participation.   Pt calm, pleasant, cooperative. Eye " "contact appropriate. Affect restricted; smiles a little on approach. Reports moderate symptoms of depression and anxiety. Rates severity of both at 5/10. Denies SI/HI/SIB. Continues to report AH which fluctuate in intensity and frequency throughout the day. Denies they command him to harm himself or others, but one tells him \"not to be in a certain place.\" Continues to pause at times while discussing symptoms as if thinking about how to describe them, or possibly attending to internal stimuli. Med-compliant. Hygiene, appetite, sleep good. Continue with current treatment plan and recommendations. Continue to monitor and reassess symptoms. Monitor response to medications. Monitor progress towards treatment goals. Encourage groups and participation.      According to :  Will assess options for increased services including Emergency MA    On interview today: Patient denies suicidal or homicidal thoughts but reports hallucinations persisting but decreased. Patient denies side effects to medications.  Patient is pleasant. He reports some depression      ROS:Patient has no other physical complaints today.      Vital signs:  Temp: 97.9  F (36.6  C) Temp src: Temporal BP: 106/70 Pulse: 98   Resp: 16 SpO2: 96 % O2 Device: None (Room air)   Height: 162.6 cm (5' 4\") Weight: 71.4 kg (157 lb 6.5 oz)  Estimated body mass index is 27.02 kg/m  as calculated from the following:    Height as of this encounter: 1.626 m (5' 4\").    Weight as of this encounter: 71.4 kg (157 lb 6.5 oz).         MSE:  Mental Status  Patient is casually dressed  Hygiene good  Speech fluent  Thought Process concrete  Thought Content:  less suicidal ideation,    No homicidal ideation,   No ideas of reference,    No loose associations,    + auditory hallucinations,     No visual hallucinations   No delusions  Psychomotor: + slowing  Cognition:  Alert and oriented to time place and person  Attention good  Concentration good  Memory normal " including recent and remote memory  Mood:  euthymic  Affect: mood congruent  Judgement: normal  Eye contact good  Cooperation good  Language normal  Fund of knowledge normal  Musculoskeletal normal gait with no abnormal movements    Minimal change in mental status in the past 24 hours          HPI:   From initial H and P  52 male who has been staying in shelters. He has at least one prior mental health admission and was admitted to [facility] in June of 2021 for similar presentation. He has more than one medical record due to inconsistent spelling of his last name ( Gurdeep vs Tiago) and confusion over whether Robbie is middle name or last name.    Patient was brought to hospital by police due to wandering in traffic. Patient suffers from chronic hallucinations, and has not been taking medications for at least several months.     Patient is cooperative to interview. He reports that auditory hallucinations have diminished since resuming Zyprexa in ER. He denies specific suicidal intent. He denies homicidal thoughts. He can not give substantial details about prior mental health treatment.    According to ER report by   Eben Carlson MD  12/29/22 2212  Juan Ordoñez is a 52 year old male with apparent history of psychosis who presents for evaluation after abnormal behavior this evening.  The patient was reportedly in street attempting to run in front of cars.  He was guarded with police who picked him up and brought him here but indicated to an ED RN that he is having voices that are telling him to hurt himself including to run in front of cars.  In conversations with me, the patient denies any concerns but is evasive regarding why he was in the street.  Medical Decision Making:  This is a 52 year old male who presents with acute suicidal behavior.  He is not intoxicated. There is no metabolic acidosis to suggest toxic alcohol ingestion and ethanol level is negative.  The patient is medically cleared  for further mental health care.  Given the patient's history of psychosis and his guarded history that he provides, I am concerned that he indeed is suicidal.  Patient handed off to my partner pending placement to a psychiatric bed.  We will restart Zydis as the patient has been on this previously but stopped taking this.      According to [facility] assessment done in ER by Jenny Vincent, LICSW, MSW, LICSW, Psychotherapist  [facility] - Triage & Transition Services  Summary of Patient Situation  Patient arrived via EMS after being found at an intersection attempting to jump in front of traffic. Patient was waiting at the intersection and would run out in front of the cars as they started to move. He told triage that he hears voices and that the voices told him to go into the road.   Met with pt with . He presents with flat affect and appears guarded. He appears distracted with possible internal preoccupation and is unable or willing to provide a good history.   He admits that he was walking into traffic in an intersection. He denies that he was attempting to end his life. When asked what he was trying to do he states 'I don't know how to explain to you' and is not able to expand on this at all, even when asked later.   He reports hearing constant voices that say 'offensive' things that are often racist. He denies that the voices told him to walk into traffic but when asked if he was feeling distressed by the voices leading to the incident he says 'yes.' He denies SI/HI/plan/intent. Denies hx suicide attempts. He does not seem to care if he is admitted or not and tells writer to decide. He is wiling to come in to a MH unit.    Brief Psychosocial History  Pt reports that he is single. He is from [place] and states he has lived in [place] for 10+ years. He has two daughters and they, along with all of his family, still live in [place]. He denies having any friends or family here in [place]. He is currently homeless and has  been staying at a shelter in [place]. He is unemployed.   Significant Clinical History  Pt states that he does not know about his mental health history. When reminded of an inpatient MH admission in 2021, he does recall this. Per chart, pt was admitted to [facility] for inpatient MH in June 2021 for psychosis and was started on Zyprexa. When asked today, he reports that he took it until he ran out but then never got refills. He has no current providers.   Per chart, pt had reported substance use at previous admission with cannabis, cocaine and methamphetamine. He denies any recent use today.               Past Psychiatric History:     At least one prior admission in June of 2021        Substance Use and History:     Denies alcohol or drug use  .         Current Medications:       OLANZapine  10 mg Oral At Bedtime     acetaminophen, alum & mag hydroxide-simethicone, hydrOXYzine, ibuprofen, OLANZapine **OR** OLANZapine, senna-docusate, traZODone         Allergies:   No Known Allergies       Labs:     No results found for this or any previous visit (from the past 48 hour(s)).             Diagnoses:   Schizophrenia, schizoaffective, chronic with acute exacerbation (H)    Patient Active Problem List   Diagnosis     Schizophrenia, schizoaffective, chronic with acute exacerbation (H)              Assessment:     Patient with schizoaffective disorder and homelessness presents with worsening hallucinations and dangerous behavior in the context of no current medication use. He continues to have psychosis         Plan:     Legal:72 hour hold but patient is willing to stay voluntary.    Medication:    OLANZapine  10 mg Oral At Bedtime     Will use Zyprexa for psychosis and adjust dose as indicated    Consults: Hospitalist will be consulted if medical issues arise    Multidisciplinary Interventions:  to gather collateral information, coordinate care with outpatient providers and begin follow up  planning and assess options for services      Disposition: assess options for placement    No further change in treatment plan  Patient seen, chart reviewed, case reviewed with  and with nursing.   Case reviewed in multi-disciplinary treatment team.          Vasquez Ray MD

## 2023-01-02 NOTE — PLAN OF CARE
01/02/23 1225   Group Therapy Session   Group Attendance attended group session   Time Session Began 1015   Time Session Ended 1150   Total Time (minutes) 95   Total # Attendees 6   Group Type expressive therapy;task skill;psychotherapeutic   Group Topic Covered coping skills/lifestyle management;problem-solving;cognitive therapy techniques;structured socialization;balanced lifestyle   Group Session Detail Occupational Therapy Clinic group to facilitate coping skill exploration, use of cognitive skills and problem solving, creative expression, clinical observation and facilitation of social, cognitive, and kinesthetic performance skills.    Patient Response/Contribution cooperative with task;listened actively;organized   Patient Participation Detail Was present for part of the time with an . See other OT assessment note with goals. He worked at a steady pace on a familiar activity, showing planning and organization to familiar detail with creative attention. He was pleasant on approach. Answers were in context. Oriented to the date.

## 2023-01-03 ENCOUNTER — APPOINTMENT (OUTPATIENT)
Dept: INTERPRETER SERVICES | Facility: CLINIC | Age: 53
End: 2023-01-03
Attending: PSYCHIATRY & NEUROLOGY
Payer: MEDICAID

## 2023-01-03 PROCEDURE — 124N000003 HC R&B MH SENIOR/ADOLESCENT

## 2023-01-03 PROCEDURE — 99231 SBSQ HOSP IP/OBS SF/LOW 25: CPT | Performed by: PSYCHIATRY & NEUROLOGY

## 2023-01-03 PROCEDURE — 250N000013 HC RX MED GY IP 250 OP 250 PS 637: Performed by: PSYCHIATRY & NEUROLOGY

## 2023-01-03 PROCEDURE — G0177 OPPS/PHP; TRAIN & EDUC SERV: HCPCS

## 2023-01-03 RX ADMIN — OLANZAPINE 10 MG: 10 TABLET, FILM COATED ORAL at 20:16

## 2023-01-03 ASSESSMENT — ACTIVITIES OF DAILY LIVING (ADL)
LAUNDRY: UNABLE TO COMPLETE
LAUNDRY: UNABLE TO COMPLETE
ADLS_ACUITY_SCORE: 32
HYGIENE/GROOMING: INDEPENDENT
HYGIENE/GROOMING: INDEPENDENT
ADLS_ACUITY_SCORE: 31
ADLS_ACUITY_SCORE: 32
DRESS: INDEPENDENT
ADLS_ACUITY_SCORE: 32
ORAL_HYGIENE: INDEPENDENT
ADLS_ACUITY_SCORE: 31
ADLS_ACUITY_SCORE: 32
ORAL_HYGIENE: INDEPENDENT
ADLS_ACUITY_SCORE: 31
ADLS_ACUITY_SCORE: 31
DRESS: INDEPENDENT;SCRUBS (BEHAVIORAL HEALTH)

## 2023-01-03 NOTE — PLAN OF CARE
Occupational Therapy     01/03/23 1200   Group Therapy Session   Group Attendance attended group session   Time Session Began 1015   Time Session Ended 1115   Total Time (minutes) 50   Total # Attendees 5-6   Group Type expressive therapy;task skill   Group Topic Covered cognitive activities;coping skills/lifestyle management;leisure exploration/use of leisure time;problem-solving;structured socialization   Group Session Detail OT: Education on healthy activity engagement and creative hands on endeavor (OT clinic) to increase concentration, focus, attention to task/detail, decision making, problem solving, frustration tolerance, task follow through, coping with stress, healthy leisure engagement, creative expression, and social engagement   Patient Response/Contribution cooperative with task;listened actively;organized;other (see comments)  (pleasant; cooperative)   Patient Participation Detail Pt arrived late to group and stayed for remainder. Pt sat among peers to complete selected creative hands on endeavor but did not engage in social interactions with peers; lack of socialization may have been due to language difference. Pt worked in a neat and tidy manner to complete projects and followed directions.  present for duration.

## 2023-01-03 NOTE — PLAN OF CARE
"  01/02/22 1200   Individualization/Patient Specific Goals   Patient Personal Strengths Interested and ready to engage in treatment.   Patient Vulnerabilities Reported that he is homeless and does not know how to ask for help. Reports: \"only God can help me.\"   Anxieties, Fears or Concerns Worried that \"the voices would not go away.\"   Interprofessional Rounds   Summary Pt was admitted due to concerns for his safety. 52 male who has been staying in shelters. He has at least one prior mental health admission and was admitted to [facility] in June of 2021 for similar presentation. He has more than one medical record due to inconsistent spelling of his last name ( Mackenzie vs Ordoñez) and confusion over whether Avalos is middle name or last name.    Pt will be engaged in the therapeutic milieu and groups where he will learn and practice positive coping skills to cope with his symptoms. Will be stabilized via medications and adjunctive interventions including psychotherapy, OT...      Participants    [facility];nursing; psychiatrist   Behavioral Team Discussion   Participants       Vasquez Ray MD;Nola Swartz RN  ; MARNIE Molina [facility];Juliana Chopra, OT  Plan:  Scheduled Meds:    OLANZapine  10 mg Oral   Will use Zyprexa for psychosis and adjust dose as indicated     Ongoing education given regarding diagnostic and treatment options with risks, benefits and alternatives and adequate verbalization of understanding.     Risk Assessment:  [facility] RISK ASSESSMENT: Patient on precautions      Progress New patient, co-operative with treatment.                "

## 2023-01-03 NOTE — PLAN OF CARE
"  Problem: Psychotic Signs/Symptoms  Goal: Improved Behavioral Control (Psychotic Signs/Symptoms)  Outcome: Progressing     Problem: Suicidal Behavior  Goal: Suicidal Behavior is Absent or Managed  Outcome: Progressing   Goal Outcome Evaluation:    Plan of Care Reviewed With: patient        Writer met with patient and interpretor. Pt reports that his mood is \"a little\" better today. Pt reports \"a little\" depression and anxiety. Pt denies thoughts of wanting to die or to harm himself. Pt reports that auditory hallucinations have decreased since admission and he feels safe in the hospital. Pt's affect is neutral with inconsistent eye contact.   Pt reports that he has been eating and  sleeping well. Pt denies pain.   Pt attended programming this AM and would like to take a shower after lunch.             "

## 2023-01-03 NOTE — PLAN OF CARE
"Initial Psychosocial Assessment     I have reviewed the chart, met with the patient, and developed Care Plan.  Information for assessment was obtained from:      Patient: Interview and Chart: Review     Presenting Problem:  HPI: \"52 male who has been staying in shelters. He has at least one prior mental health admission and was admitted to [facility] in June of 2021 for similar presentation. He has more than one medical record due to inconsistent spelling of his last name ( Gurdeep vs Tiago) and confusion over whether Robbie is middle name or last name.     Patient was brought to hospital by police due to wandering in traffic. Patient suffers from chronic hallucinations, and has not been taking medications for at least several months.     Patient is cooperative to interview. He reports that auditory hallucinations have diminished since resuming Zyprexa in ER. He denies specific suicidal intent. He denies homicidal thoughts. He can not give substantial details about prior mental health treatment.\"     History of Mental Health and Chemical Dependency:  Pt reported that he does not use substances. Reports having had some sustained problems with mental illness. Reported that he does not feel safe being on the street. Declined to stated who was harming him or where he was being harmed.      Family Description (Constellation, Family Psychiatric History):  Pt declined to give information on this     Significant Life Events (Illness, Abuse, Trauma, Death):  Did not report     Living Situation:  Pt reported that he is homeless  Educational Background:  Unable to provide     Occupational History:  Unable to provide      Financial Status:  Supports himself. Homeless. Has no insurance     Legal Issues:  None     Ethnic/Cultural Issues:  Vietnamese     Spiritual Orientation:  Adventism      Service History:  No     Social Functioning (organization, interests):  Could not identify any     Current Treatment Providers " are:  No current outpatient care providers     Social Service Assessment/Plan:  Patient has been admitted for psychiatric stabilization for this acute crisis. Patient will meet with treatment team including a psychiatrist to have psychiatric assessment and medication management. Team will coordinate with the any outpatient medication providers to review and adjust medications as appropriate. Staff will provide therapeutic programming and structure to help maintain a safe environment for healing. Staff will continue to assess patient as needed. Patient will participate in unit groups and activities. Patient will receive individual and group support on the unit. CTC will coordinate with outpatient providers and will place referrals to ensure appropriate follow up care is in place.

## 2023-01-03 NOTE — PROGRESS NOTES
"    Interim History:     Patient seen and chart reviewed. Case reviewed in multi-disciplinary treatment team.      According to Nursing report:  Patient is flat and can be withdrawn. He was confused during interview with interpretor. He still reports voices. He has adequate self care.       According to Nursing notes:  Met with patient and interpretor mid morning. Pt rated depression and anxiety at 5/10. Pt denies SI/SIB. Pt does admit to auditory hallucinations. Pt reports that the tell him not to stay in one place. Pt reports that these have improved since admission.    Pt reports that  he is sleeping ok. Pt oriented to January and self. Pt thought the year as 2022. Pt unable to state the name of the city,  stated the state as [place] was unable to state the date or day of the week.    Pt asked when he would be going. Pt agreeable to stay and work with CTC and provider to find him safe place to discharge to.      Pt presents with a flat/blunted affect, endorsed depression and rated at 3/10. Pt denies anxiety, SI/SIB/HI. Pt reports auditory hallucinations, voices telling him \"to do the opposite of what I do\". Pt agreeable to alert staff if voices command him to self harm or harm others\". Pt states he feels safe in the unit. Pt denies any other forms of hallucinations at this time. States his goal is to keep working on getting better. Pt reports going to groups and being active in addition to taking his medication has been helpful.   Pt was intermittently out in the milieu, socially withdrawn.   Pt denies any pain, problem with appetite or elimination. Pt took a shower this evening.             According to :  Discharge Plan or Goal  Pt is new and is undergoing stabilization. He is currently homeless   Barriers to Discharge   Safe discharge plan, ongoing symptom severity (highly disorganized, and having psychosis in the context of hearing voices), and medication evaluation/assessment.  Referral " "Status  None  Legal Status  72 hour hold        On interview today: Patient denies suicidal or homicidal thoughts but hallucinations persist. Patient denies side effects to medications. He notes some improvement. He reports some mild depression. He is pleasant and agrees to stay in hospital voluntary          ROS  Patient has no other physical complaints today.        Vital signs:  Temp: 97.7  F (36.5  C) Temp src: Temporal BP: 122/75 Pulse: 84   Resp: 16 SpO2: 97 % O2 Device: None (Room air)   Height: 162.6 cm (5' 4\") Weight: 71.4 kg (157 lb 6.5 oz)  Estimated body mass index is 27.02 kg/m  as calculated from the following:    Height as of this encounter: 1.626 m (5' 4\").    Weight as of this encounter: 71.4 kg (157 lb 6.5 oz).         MSE:  Mental Status  Patient is casually dressed  Hygiene good  Speech fluent  Thought Process concrete  Thought Content:  less suicidal ideation,    No homicidal ideation,   No ideas of reference,    No loose associations,    + auditory hallucinations,     No visual hallucinations   No delusions  Psychomotor: + slowing  Cognition:  Alert and oriented to time place and person  Attention good  Concentration good  Memory normal including recent and remote memory  Mood:  euthymic  Affect: mood congruent  Judgement: normal  Eye contact good  Cooperation good  Language normal  Fund of knowledge normal  Musculoskeletal normal gait with no abnormal movements    Minimal change in mental status in the past 24 hours          HPI:   From initial H and P  52 male who has been staying in shelters. He has at least one prior mental health admission and was admitted to [facility] in June of 2021 for similar presentation. He has more than one medical record due to inconsistent spelling of his last name ( Mackenize vs Ordoñez) and confusion over whether Avalos is middle name or last name.    Patient was brought to hospital by police due to wandering in traffic. Patient suffers from chronic " hallucinations, and has not been taking medications for at least several months.     Patient is cooperative to interview. He reports that auditory hallucinations have diminished since resuming Zyprexa in ER. He denies specific suicidal intent. He denies homicidal thoughts. He can not give substantial details about prior mental health treatment.    According to ER report by   Eben Carlson MD  12/29/22 0175  Juan Ordoñez is a 52 year old male with apparent history of psychosis who presents for evaluation after abnormal behavior this evening.  The patient was reportedly in street attempting to run in front of cars.  He was guarded with police who picked him up and brought him here but indicated to an ED RN that he is having voices that are telling him to hurt himself including to run in front of cars.  In conversations with me, the patient denies any concerns but is evasive regarding why he was in the street.  Medical Decision Making:  This is a 52 year old male who presents with acute suicidal behavior.  He is not intoxicated. There is no metabolic acidosis to suggest toxic alcohol ingestion and ethanol level is negative.  The patient is medically cleared for further mental health care.  Given the patient's history of psychosis and his guarded history that he provides, I am concerned that he indeed is suicidal.  Patient handed off to my partner pending placement to a psychiatric bed.  We will restart Zydis as the patient has been on this previously but stopped taking this.      According to DEC assessment done in ER by Jenny Vincent, LICSW, MSW, LICSW, Psychotherapist  DEC - Triage & Transition Services  Summary of Patient Situation  Patient arrived via EMS after being found at an intersection attempting to jump in front of traffic. Patient was waiting at the intersection and would run out in front of the cars as they started to move. He told triage that he hears voices and that the voices told him  to go into the road.   Met with pt with . He presents with flat affect and appears guarded. He appears distracted with possible internal preoccupation and is unable or willing to provide a good history.   He admits that he was walking into traffic in an intersection. He denies that he was attempting to end his life. When asked what he was trying to do he states 'I don't know how to explain to you' and is not able to expand on this at all, even when asked later.   He reports hearing constant voices that say 'offensive' things that are often racist. He denies that the voices told him to walk into traffic but when asked if he was feeling distressed by the voices leading to the incident he says 'yes.' He denies SI/HI/plan/intent. Denies hx suicide attempts. He does not seem to care if he is admitted or not and tells writer to decide. He is wiling to come in to a MH unit.    Brief Psychosocial History  Pt reports that he is single. He is from [place] and states he has lived in [place] for 10+ years. He has two daughters and they, along with all of his family, still live in [place]. He denies having any friends or family here in [place]. He is currently homeless and has been staying at a shelter in [place]. He is unemployed.   Significant Clinical History  Pt states that he does not know about his mental health history. When reminded of an inpatient MH admission in 2021, he does recall this. Per chart, pt was admitted to [facility] for inpatient MH in June 2021 for psychosis and was started on Zyprexa. When asked today, he reports that he took it until he ran out but then never got refills. He has no current providers.   Per chart, pt had reported substance use at previous admission with cannabis, cocaine and methamphetamine. He denies any recent use today.               Past Psychiatric History:     At least one prior admission in June of 2021        Substance Use and History:     Denies alcohol or  drug use  .         Current Medications:       OLANZapine  10 mg Oral At Bedtime     acetaminophen, alum & mag hydroxide-simethicone, hydrOXYzine, ibuprofen, OLANZapine **OR** OLANZapine, senna-docusate, traZODone         Allergies:   No Known Allergies       Labs:     No results found for this or any previous visit (from the past 48 hour(s)).             Diagnoses:   Schizophrenia, schizoaffective, chronic with acute exacerbation (H)    Patient Active Problem List   Diagnosis     Schizophrenia, schizoaffective, chronic with acute exacerbation (H)              Assessment:     Patient with schizoaffective disorder and homelessness presents with worsening hallucinations and dangerous behavior in the context of no current medication use. He continues to have psychosis         Plan:     Legal:72 hour hold but patient is willing to stay voluntary.    Medication:    OLANZapine  10 mg Oral At Bedtime     Will use Zyprexa for psychosis and adjust dose as indicated    Consults: Hospitalist will be consulted if medical issues arise    Multidisciplinary Interventions:  to gather collateral information, coordinate care with outpatient providers and begin follow up planning and assess options for services      Disposition: assess options for placement    No further change in treatment plan  Patient seen, chart reviewed, case reviewed with  and with nursing.   Case reviewed in multi-disciplinary treatment team.        Vasquez Ray MD

## 2023-01-03 NOTE — PLAN OF CARE
Assessment/Intervention/Current Symtoms and Care Coordination  CTC (writer)  reviewed pt's chart.   -Met with pt and completed his initial psychosocial assessment with the help of a       Discharge Plan or Goal  Pt is new and is undergoing stabilization. He is currently homeless      Barriers to Discharge   Safe discharge plan, ongoing symptom severity (highly disorganized, and having psychosis in the context of hearing voices), and medication evaluation/assessment.     Referral Status  None  Legal Status  72 hour hold

## 2023-01-03 NOTE — PLAN OF CARE
Goal Outcome Evaluation:       Patient slept well the whole shift for 7.5 hours. No complaints made. No behavioral issues observed.

## 2023-01-04 ENCOUNTER — APPOINTMENT (OUTPATIENT)
Dept: INTERPRETER SERVICES | Facility: CLINIC | Age: 53
End: 2023-01-04
Attending: PSYCHIATRY & NEUROLOGY
Payer: MEDICAID

## 2023-01-04 ENCOUNTER — APPOINTMENT (OUTPATIENT)
Dept: INTERPRETER SERVICES | Facility: CLINIC | Age: 53
End: 2023-01-04
Payer: MEDICAID

## 2023-01-04 PROCEDURE — 124N000003 HC R&B MH SENIOR/ADOLESCENT

## 2023-01-04 PROCEDURE — 99231 SBSQ HOSP IP/OBS SF/LOW 25: CPT | Performed by: PSYCHIATRY & NEUROLOGY

## 2023-01-04 PROCEDURE — G0177 OPPS/PHP; TRAIN & EDUC SERV: HCPCS

## 2023-01-04 PROCEDURE — 250N000013 HC RX MED GY IP 250 OP 250 PS 637: Performed by: PSYCHIATRY & NEUROLOGY

## 2023-01-04 RX ADMIN — OLANZAPINE 10 MG: 10 TABLET, FILM COATED ORAL at 20:09

## 2023-01-04 ASSESSMENT — ACTIVITIES OF DAILY LIVING (ADL)
ORAL_HYGIENE: INDEPENDENT
ADLS_ACUITY_SCORE: 32
ADLS_ACUITY_SCORE: 32
HYGIENE/GROOMING: INDEPENDENT
HYGIENE/GROOMING: INDEPENDENT
DRESS: INDEPENDENT;SCRUBS (BEHAVIORAL HEALTH)
ADLS_ACUITY_SCORE: 32
DRESS: INDEPENDENT
ADLS_ACUITY_SCORE: 32
ORAL_HYGIENE: INDEPENDENT
ADLS_ACUITY_SCORE: 32
LAUNDRY: UNABLE TO COMPLETE

## 2023-01-04 NOTE — PLAN OF CARE
Occupational Therapy Group Note:     01/04/23 1500   Group Therapy Session   Group Attendance attended group session   Time Session Began 1315   Time Session Ended 1445   Total Time (minutes) 60   Total # Attendees 3   Group Type expressive therapy   Group Topic Covered coping skills/lifestyle management   Group Session Detail OT clinic   Patient Response/Contribution cooperative with task;organized   Patient Participation Detail Pt actively participated in occupational therapy clinic (accompanied by an ) to facilitate coping skill exploration, creative expression within personally meaningful activities, and clinical observation of social, cognitive, and kinesthetic performance skills. Pt response: Independent to initiate, gather materials, sequence, and adjust to workspace demands as needed. Demonstrated good focus, planning, and attention to detail for selected creative expression task. Able to ask for assistance as needed, and appeared comfortable interacting with peers and staff, though spent a majority of group quietly focused on his task. Politely thanked writer at the end of group.

## 2023-01-04 NOTE — PROGRESS NOTES
"    Interim History:     Patient seen and chart reviewed. Case reviewed in multi-disciplinary treatment team.      According to Nursing report:  Patient still reports auditory hallucinations. He can be visible on unit but minimal interactions. He will attend groups. He is calm and cooperative and has adequate self care and does not need prompts for self cares.    According to Nursing notes:  Writer met with patient and interpretor. Pt reports that his mood is \"a little\" better today. Pt reports \"a little\" depression and anxiety. Pt denies thoughts of wanting to die or to harm himself. Pt reports that auditory hallucinations have decreased since admission and he feels safe in the hospital. Pt's affect is neutral with inconsistent eye contact.   Pt reports that he has been eating and  sleeping well. Pt denies pain.   Pt attended programming this AM and would like to take a shower after lunch.  Pt endorsed auditory hallucinations, says he is hearing a young boys voice that is stating \"I don't think fit anywhere, any place\". Writer validated pt's concern, but informed pt not to pay much attention to what that voice is saying for that is not real. He rated both anxiety and depression at 4/10 respectively, but declined intervention for anxiety. He denied SI/SIB/HI/VH, and contracted for safety. Up and visible on the unit all shift watching TV with peers. Due to language barrier, pt is not able to engage peers into a conversation. He seem to keep to himself. He is pleasant on approach. Affect remains flat, he is calm, cooperative, and medication compliant. No other concerns noted or verbalized.             According to :  Discharge Plan or Goal  Pt is new and is undergoing stabilization. He is currently homeless      Barriers to Discharge   Safe discharge plan, ongoing symptom severity (highly disorganized, and having psychosis in the context of hearing voices), and " "medication evaluation/assessment.     Referral Status  None  Legal Status  72 hour hold        On interview today: Patient denies suicidal or homicidal thoughts but still has some hallucinations that are improving. Patient denies side effects to medications.  Patient is pleasant and cooperative. He is not confused but has limited ability to plan. He denies substantial depression         ROS. Patient has no other physical complaints today.          Vital signs:  Temp: 98.8  F (37.1  C) Temp src: Oral BP: 113/76 Pulse: 75     SpO2: 98 % O2 Device: None (Room air)   Height: 162.6 cm (5' 4\") Weight: 71.3 kg (157 lb 3 oz)  Estimated body mass index is 26.98 kg/m  as calculated from the following:    Height as of this encounter: 1.626 m (5' 4\").    Weight as of this encounter: 71.3 kg (157 lb 3 oz).         MSE:  Mental Status  Patient is casually dressed  Hygiene good  Speech fluent  Thought Process concrete  Thought Content: denies suicidal ideation,    No homicidal ideation,   No ideas of reference,    No loose associations,    + auditory hallucinations,     No visual hallucinations   No delusions  Psychomotor: + slowing  Cognition:  Alert and oriented to time place and person  Attention good  Concentration good  Memory normal including recent and remote memory  Mood:  euthymic  Affect: mood congruent  Judgement: normal  Eye contact good  Cooperation good  Language normal  Fund of knowledge normal  Musculoskeletal normal gait with no abnormal movements    Minimal change in mental status in the past 24 hours          HPI:   From initial H and P  52 male who has been staying in shelters. He has at least one prior mental health admission and was admitted to [facility] in June of 2021 for similar presentation. He has more than one medical record due to inconsistent spelling of his last name ( Mackenzie vs Ordoñez) and confusion over whether Avalos is middle name or last name.    Patient was brought to hospital by police due " to wandering in traffic. Patient suffers from chronic hallucinations, and has not been taking medications for at least several months.     Patient is cooperative to interview. He reports that auditory hallucinations have diminished since resuming Zyprexa in ER. He denies specific suicidal intent. He denies homicidal thoughts. He can not give substantial details about prior mental health treatment.    According to ER report by   Eben Carlson MD  12/29/22 2218  Juan Ordoñez is a 52 year old male with apparent history of psychosis who presents for evaluation after abnormal behavior this evening.  The patient was reportedly in street attempting to run in front of cars.  He was guarded with police who picked him up and brought him here but indicated to an ED RN that he is having voices that are telling him to hurt himself including to run in front of cars.  In conversations with me, the patient denies any concerns but is evasive regarding why he was in the street.  Medical Decision Making:  This is a 52 year old male who presents with acute suicidal behavior.  He is not intoxicated. There is no metabolic acidosis to suggest toxic alcohol ingestion and ethanol level is negative.  The patient is medically cleared for further mental health care.  Given the patient's history of psychosis and his guarded history that he provides, I am concerned that he indeed is suicidal.  Patient handed off to my partner pending placement to a psychiatric bed.  We will restart Zydis as the patient has been on this previously but stopped taking this.      According to DEC assessment done in ER by Jenny Vincent, LICSW, MSW, LICSW, Psychotherapist  DEC - Triage & Transition Services  Summary of Patient Situation  Patient arrived via EMS after being found at an intersection attempting to jump in front of traffic. Patient was waiting at the intersection and would run out in front of the cars as they started to move. He told  triage that he hears voices and that the voices told him to go into the road.   Met with pt with . He presents with flat affect and appears guarded. He appears distracted with possible internal preoccupation and is unable or willing to provide a good history.   He admits that he was walking into traffic in an intersection. He denies that he was attempting to end his life. When asked what he was trying to do he states 'I don't know how to explain to you' and is not able to expand on this at all, even when asked later.   He reports hearing constant voices that say 'offensive' things that are often racist. He denies that the voices told him to walk into traffic but when asked if he was feeling distressed by the voices leading to the incident he says 'yes.' He denies SI/HI/plan/intent. Denies hx suicide attempts. He does not seem to care if he is admitted or not and tells writer to decide. He is wiling to come in to a MH unit.    Brief Psychosocial History  Pt reports that he is single. He is from [place] and states he has lived in [place] for 10+ years. He has two daughters and they, along with all of his family, still live in [place]. He denies having any friends or family here in [place]. He is currently homeless and has been staying at a shelter in [place]. He is unemployed.   Significant Clinical History  Pt states that he does not know about his mental health history. When reminded of an inpatient MH admission in 2021, he does recall this. Per chart, pt was admitted to [facility] for inpatient MH in June 2021 for psychosis and was started on Zyprexa. When asked today, he reports that he took it until he ran out but then never got refills. He has no current providers.   Per chart, pt had reported substance use at previous admission with cannabis, cocaine and methamphetamine. He denies any recent use today.               Past Psychiatric History:     At least one prior admission in June of  2021        Substance Use and History:     Denies alcohol or drug use  .         Current Medications:       OLANZapine  10 mg Oral At Bedtime     acetaminophen, alum & mag hydroxide-simethicone, hydrOXYzine, ibuprofen, OLANZapine **OR** OLANZapine, senna-docusate, traZODone         Allergies:   No Known Allergies       Labs:     No results found for this or any previous visit (from the past 48 hour(s)).             Diagnoses:   Schizophrenia, schizoaffective, chronic with acute exacerbation (H)    Patient Active Problem List   Diagnosis     Schizophrenia, schizoaffective, chronic with acute exacerbation (H)              Assessment:     Patient with schizoaffective disorder and homelessness presents with worsening hallucinations and dangerous behavior in the context of no current medication use. He continues to have psychosis         Plan:     Legal:72 hour hold but patient is willing to stay voluntary.    Medication:    OLANZapine  10 mg Oral At Bedtime     Will use Zyprexa for psychosis and adjust dose as indicated    Consults: Hospitalist will be consulted if medical issues arise    Multidisciplinary Interventions:  to gather collateral information, coordinate care with outpatient providers and begin follow up planning and assess options for services      Disposition: assess options for placement    No further change in treatment plan  Patient seen, chart reviewed, case reviewed with  and with nursing.   Case reviewed in multi-disciplinary treatment team.        Vasquez Ray MD

## 2023-01-04 NOTE — PLAN OF CARE
"Occupational Therapy Group Note:     01/04/23 1200   Group Therapy Session   Group Attendance attended group session   Time Session Began 1120   Time Session Ended 1210   Total Time (minutes) 50   Total # Attendees 2-4   Group Type life skill   Group Topic Covered balanced lifestyle;coping skills/lifestyle management;structured socialization   Group Session Detail values discussion   Patient Response/Contribution cooperative with task;discussed personal experience with topic   Patient Participation Detail Pt actively participated in a structured occupational therapy group (accompanied by an ) with a focus on identifying and prioritizing personal values. Pt contributed to a group discussion that facilitated self-reflection and application of personal values. Using a list of values, pt identified \"adventure, spirituality, love, peace, and family\" as his important values. Shared that his \"parents and God\" are most important in his life, and frequently spoke about the importance of spirituality.          "

## 2023-01-04 NOTE — PLAN OF CARE
"  Problem: Adult Behavioral Health Plan of Care  Goal: Adheres to Safety Considerations for Self and Others  Intervention: Develop and Maintain Individualized Safety Plan  Recent Flowsheet Documentation  Taken 1/4/2023 1024 by Nola Swartz RN  Safety Measures:    environmental rounds completed    safety rounds completed    suicide check-in completed     Problem: Adult Behavioral Health Plan of Care  Goal: Absence of New-Onset Illness or Injury  Intervention: Identify and Manage Fall Risk  Recent Flowsheet Documentation  Taken 1/4/2023 1024 by Nola Swartz RN  Safety Measures:    environmental rounds completed    safety rounds completed    suicide check-in completed     Problem: Psychotic Signs/Symptoms  Goal: Improved Psychomotor Symptoms (Psychotic Signs/Symptoms)  Intervention: Manage Psychomotor Movement  Recent Flowsheet Documentation  Taken 1/4/2023 1024 by Nola Swartz RN  Patient Performed Hygiene:    dressed    shampoo    shower    teeth brushed  Activity (Behavioral Health): up ad jessica     Problem: Suicidal Behavior  Goal: Suicidal Behavior is Absent or Managed  Outcome: Progressing   Goal Outcome Evaluation:    Plan of Care Reviewed With: patient       1300- Writer contacted interpretor services to request that Juan interpretor come back for 1 hour as there had not been a interpretor scheduled at this time and shilpi is on unit to meet with patient.    Writer met with patient and interpretor. Pt reports that he has \"a little\" depression and anxiety. Pt reports that he feels safe here and is not having any thoughts of wanting to die or harm self. Pt continues to have auditory hallucinations that he identifies having for 3 years. Pt feels that medications are helping to decrease voices.   Pt denies pain, reports sleeping well and that he has a good appetite. Pt requested to see a shilpi. Spiritual health consult placed.                  "

## 2023-01-04 NOTE — PLAN OF CARE
Problem: Psychotic Signs/Symptoms  Goal: Improved Sleep (Psychotic Signs/Symptoms)  Outcome: Progressing   Goal Outcome Evaluation:    Patient slept for a total of 8.5 hours without any interruptions. No suicidal ideations and other signs of symptoms of depression  noted the whole shift.

## 2023-01-04 NOTE — PLAN OF CARE
"  Problem: Adult Behavioral Health Plan of Care  Goal: Plan of Care Review  Outcome: Progressing  Flowsheets (Taken 1/3/2023 1636)  Patient Agreement with Plan of Care: agrees     Problem: Psychotic Signs/Symptoms  Goal: Improved Behavioral Control (Psychotic Signs/Symptoms)  Outcome: Progressing   Goal Outcome Evaluation:    Plan of Care Reviewed With: patient          Pt endorsed auditory hallucinations, says he is hearing a young boys voice that is stating \"I don't think fit anywhere, any place\". Writer validated pt's concern, but informed pt not to pay much attention to what that voice is saying for that is not real. He rated both anxiety and depression at 4/10 respectively, but declined intervention for anxiety. He denied SI/SIB/HI/VH, and contracted for safety. Up and visible on the unit all shift watching TV with peers. Due to language barrier, pt is not able to engage peers into a conversation. He seem to keep to himself. He is pleasant on approach. Affect remains flat, he is calm, cooperative, and medication compliant. No other concerns noted or verbalized.            "

## 2023-01-04 NOTE — PLAN OF CARE
Assessment/Intervention/Current Symtoms and Care Coordination  CTC (writer)  reviewed pt's chart.   -Made [facility] referral  -Awaiting response from [facility]. Provided unit number so that [facility] can contact pt via the unit number.  -Reached out to financial department for possible assistance with emergency MA or any other support that pt might qualify for.      Discharge Plan or Goal  Pt is new and is undergoing stabilization. He is currently homeless      Barriers to Discharge   Safe discharge plan, ongoing symptom severity (highly disorganized, and having psychosis in the context of hearing voices), and medication evaluation/assessment.     Referral Status  None  Legal Status  72 hour hold

## 2023-01-04 NOTE — PLAN OF CARE
Assessment/Intervention/Current Symtoms and Care Coordination  CTC (writer)  reviewed pt's chart.   -Made [facility] referral      Discharge Plan or Goal  Pt is new and is undergoing stabilization. He is currently homeless      Barriers to Discharge   Safe discharge plan, ongoing symptom severity (highly disorganized, and having psychosis in the context of hearing voices), and medication evaluation/assessment.     Referral Status  None  Legal Status  72 hour hold

## 2023-01-04 NOTE — PROGRESS NOTES
Initial visit, for emotional and spiritual support. Patient expressed feeling low and loss of interest. Patient identified vilma in God, prayer and Scripture as resources for resiliency.  offered encouragement, comforts, reassurance, and Bible (in Malay) per request. Patient appreciated the visit and spiritual support.  to follow up as needed.

## 2023-01-05 ENCOUNTER — APPOINTMENT (OUTPATIENT)
Dept: INTERPRETER SERVICES | Facility: CLINIC | Age: 53
End: 2023-01-05
Attending: PSYCHIATRY & NEUROLOGY
Payer: MEDICAID

## 2023-01-05 PROCEDURE — 99231 SBSQ HOSP IP/OBS SF/LOW 25: CPT | Mod: 95 | Performed by: PSYCHIATRY & NEUROLOGY

## 2023-01-05 PROCEDURE — 250N000013 HC RX MED GY IP 250 OP 250 PS 637: Performed by: PSYCHIATRY & NEUROLOGY

## 2023-01-05 PROCEDURE — H2032 ACTIVITY THERAPY, PER 15 MIN: HCPCS

## 2023-01-05 PROCEDURE — 124N000003 HC R&B MH SENIOR/ADOLESCENT

## 2023-01-05 PROCEDURE — G0177 OPPS/PHP; TRAIN & EDUC SERV: HCPCS

## 2023-01-05 RX ADMIN — OLANZAPINE 10 MG: 10 TABLET, FILM COATED ORAL at 20:27

## 2023-01-05 ASSESSMENT — ACTIVITIES OF DAILY LIVING (ADL)
ADLS_ACUITY_SCORE: 32
HYGIENE/GROOMING: INDEPENDENT
ADLS_ACUITY_SCORE: 32
ADLS_ACUITY_SCORE: 32
HYGIENE/GROOMING: INDEPENDENT
ADLS_ACUITY_SCORE: 32
ADLS_ACUITY_SCORE: 32
ORAL_HYGIENE: INDEPENDENT
ORAL_HYGIENE: INDEPENDENT
ADLS_ACUITY_SCORE: 32
ADLS_ACUITY_SCORE: 32
DRESS: INDEPENDENT
ADLS_ACUITY_SCORE: 32
DRESS: INDEPENDENT
ADLS_ACUITY_SCORE: 32
ADLS_ACUITY_SCORE: 32
LAUNDRY: UNABLE TO COMPLETE
LAUNDRY: UNABLE TO COMPLETE

## 2023-01-05 NOTE — PLAN OF CARE
Problem: Psychotic Signs/Symptoms  Goal: Improved Sleep (Psychotic Signs/Symptoms)  Outcome: Progressing   Goal Outcome Evaluation:    Patient slept a total of 8.5 hours. No psychotic behavior noted the whole shift.

## 2023-01-05 NOTE — PROGRESS NOTES
"    Interim History:     Patient seen and chart reviewed. Case reviewed in multi-disciplinary treatment team.      According to Nursing report: Patient is cooperative and calm on unit. He is visible. Language prevents interactions with peers but will engage when interpretor is present      According to Nursing notes:  Writer met with patient and interpretor. Pt reports that he has \"a little\" depression and anxiety. Pt reports that he feels safe here and is not having any thoughts of wanting to die or harm self. Pt continues to have auditory hallucinations that he identifies having for 3 years. Pt feels that medications are helping to decrease voices.   Pt denies pain, reports sleeping well and that he has a good appetite. Pt requested to see a shilpi. Spiritual health consult placed.   Pt visible in milieu. Attended part of evening therapeutic group with  present. Eye contact good. Affect restricted, brightens somewhat in conversation. Reports mood is improving. Rated severity of depression at 4/10, down from 9/10 on arrival. Denies anxiety or SI. Reports AH are also improving. Reports diminished intensity and frequency, denies commands. Med-compliant. Appetite, sleep, hygiene good. No complaints of pain or SE. Continue with current treatment plan and recommendations. Continue to monitor and reassess symptoms. Monitor response to medications. Monitor progress towards treatment goals. Encourage groups and participation.                According to :  CTC (writer)  reviewed pt's chart.   -Made [facility] referral  -Awaiting response from [facility]. Provided unit number so that [facility] can contact pt via the unit number.  -Reached out to financial department for possible assistance with emergency MA or any other support that pt might qualify for.   Discharge Plan or Goal  Pt is new and is undergoing stabilization. He is currently homeless  Barriers to Discharge   Safe discharge plan, ongoing " "symptom severity (highly disorganized, and having psychosis in the context of hearing voices), and medication evaluation/assessment.      On interview today: Patient denies suicidal or homicidal thoughts, but some hallucinations persist although they have diminished. Patient denies side effects to medications.  Patient is more cheerful and denies substantial depression. He is willing to stay in the hospital voluntary        ROS:Patient has no other physical complaints today.          Vital signs:  Temp: 98.2  F (36.8  C) Temp src: Temporal BP: 130/84 Pulse: 81     SpO2: 98 % O2 Device: None (Room air)   Height: 162.6 cm (5' 4\") Weight: 71.3 kg (157 lb 3 oz)  Estimated body mass index is 26.98 kg/m  as calculated from the following:    Height as of this encounter: 1.626 m (5' 4\").    Weight as of this encounter: 71.3 kg (157 lb 3 oz).         MSE:  Mental Status  Patient is casually dressed  Hygiene good  Speech fluent  Thought Process concrete  Thought Content: denies suicidal ideation,    No homicidal ideation,   No ideas of reference,    No loose associations,    less auditory hallucinations,     No visual hallucinations   No delusions  Psychomotor: + slowing  Cognition:  Alert and oriented to time place and person  Attention good  Concentration good  Memory normal including recent and remote memory  Mood:  euthymic  Affect: mood congruent  Judgement: normal  Eye contact good  Cooperation good  Language normal  Fund of knowledge normal  Musculoskeletal normal gait with no abnormal movements    Gradual improvement but minimal change in mental status in the past 24 hours          HPI:   From initial H and P  52 male who has been staying in shelters. He has at least one prior mental health admission and was admitted to [facility] in June of 2021 for similar presentation. He has more than one medical record due to inconsistent spelling of his last name ( Gurdeep vs Tiago) and confusion over whether Avalos is middle " name or last name.    Patient was brought to hospital by police due to wandering in traffic. Patient suffers from chronic hallucinations, and has not been taking medications for at least several months.     Patient is cooperative to interview. He reports that auditory hallucinations have diminished since resuming Zyprexa in ER. He denies specific suicidal intent. He denies homicidal thoughts. He can not give substantial details about prior mental health treatment.    According to ER report by   Eben Carlson MD  12/29/22 2212  Juan Ordoñez is a 52 year old male with apparent history of psychosis who presents for evaluation after abnormal behavior this evening.  The patient was reportedly in street attempting to run in front of cars.  He was guarded with police who picked him up and brought him here but indicated to an ED RN that he is having voices that are telling him to hurt himself including to run in front of cars.  In conversations with me, the patient denies any concerns but is evasive regarding why he was in the street.  Medical Decision Making:  This is a 52 year old male who presents with acute suicidal behavior.  He is not intoxicated. There is no metabolic acidosis to suggest toxic alcohol ingestion and ethanol level is negative.  The patient is medically cleared for further mental health care.  Given the patient's history of psychosis and his guarded history that he provides, I am concerned that he indeed is suicidal.  Patient handed off to my partner pending placement to a psychiatric bed.  We will restart Zydis as the patient has been on this previously but stopped taking this.      According to [facility] assessment done in ER by Jenny Vincent, LICSW, MSW, LICSW, Psychotherapist  [facility] - Triage & Transition Services  Summary of Patient Situation  Patient arrived via EMS after being found at an intersection attempting to jump in front of traffic. Patient was waiting at the intersection and  would run out in front of the cars as they started to move. He told triage that he hears voices and that the voices told him to go into the road.   Met with pt with . He presents with flat affect and appears guarded. He appears distracted with possible internal preoccupation and is unable or willing to provide a good history.   He admits that he was walking into traffic in an intersection. He denies that he was attempting to end his life. When asked what he was trying to do he states 'I don't know how to explain to you' and is not able to expand on this at all, even when asked later.   He reports hearing constant voices that say 'offensive' things that are often racist. He denies that the voices told him to walk into traffic but when asked if he was feeling distressed by the voices leading to the incident he says 'yes.' He denies SI/HI/plan/intent. Denies hx suicide attempts. He does not seem to care if he is admitted or not and tells writer to decide. He is wiling to come in to a MH unit.    Brief Psychosocial History  Pt reports that he is single. He is from [place] and states he has lived in [place] for 10+ years. He has two daughters and they, along with all of his family, still live in [place]. He denies having any friends or family here in [place]. He is currently homeless and has been staying at a shelter in [place]. He is unemployed.   Significant Clinical History  Pt states that he does not know about his mental health history. When reminded of an inpatient MH admission in 2021, he does recall this. Per chart, pt was admitted to [facility] for inpatient MH in June 2021 for psychosis and was started on Zyprexa. When asked today, he reports that he took it until he ran out but then never got refills. He has no current providers.   Per chart, pt had reported substance use at previous admission with cannabis, cocaine and methamphetamine. He denies any recent use today.               Past  Psychiatric History:     At least one prior admission in June of 2021        Substance Use and History:     Denies alcohol or drug use  .         Current Medications:       OLANZapine  10 mg Oral At Bedtime     acetaminophen, alum & mag hydroxide-simethicone, hydrOXYzine, ibuprofen, OLANZapine **OR** OLANZapine, senna-docusate, traZODone         Allergies:   No Known Allergies       Labs:     No results found for this or any previous visit (from the past 48 hour(s)).             Diagnoses:   Schizophrenia, schizoaffective, chronic with acute exacerbation (H)    Patient Active Problem List   Diagnosis     Schizophrenia, schizoaffective, chronic with acute exacerbation (H)              Assessment:     Patient with schizoaffective disorder and homelessness presents with worsening hallucinations and dangerous behavior in the context of no current medication use. He continues to have psychosis         Plan:     Legal:72 hour hold but patient is willing to stay voluntary.    Medication:    OLANZapine  10 mg Oral At Bedtime     Will use Zyprexa for psychosis and adjust dose as indicated    Consults: Hospitalist will be consulted if medical issues arise    Multidisciplinary Interventions:  to gather collateral information, coordinate care with outpatient providers and begin follow up planning and assess options for services      Disposition: assess options for placement    No further change in treatment plan  Patient seen, chart reviewed, case reviewed with  and with nursing.   Case reviewed in multi-disciplinary treatment team.      Tele-Visit Details    Type of service:  Video Visit    Start Time : 0830    End Time : 0840    Originating Location (pt. Location): [facility] unit     Distant Location (provider location): home office [facility]    Mode of Communication:  Video Conference via [employer]    Physician has received verbal consent for a video visit from the  patient? yes      Vasquez Ray MD

## 2023-01-05 NOTE — PLAN OF CARE
Goal Outcome Evaluation:    Plan of Care Reviewed With: patient          Pt visible in milieu. Attended part of evening therapeutic group with  present. Eye contact good. Affect restricted, brightens somewhat in conversation. Reports mood is improving. Rated severity of depression at 4/10, down from 9/10 on arrival. Denies anxiety or SI. Reports AH are also improving. Reports diminished intensity and frequency, denies commands. Med-compliant. Appetite, sleep, hygiene good. No complaints of pain or SE. Continue with current treatment plan and recommendations. Continue to monitor and reassess symptoms. Monitor response to medications. Monitor progress towards treatment goals. Encourage groups and participation.

## 2023-01-05 NOTE — PLAN OF CARE
Assessment/Intervention/Current Symtoms and Care Coordination  CTC (writer)  reviewed pt's chart.   -Rounded with team  -Pt signed ROIs for emergency MA application and they have been returned to the financial counselor  -Made [facility] referral  -Awaiting response from [facility]. Provided unit number so that [facility] can contact pt via the unit number.  -Reached out to financial department for possible assistance with emergency MA or any other support that pt might qualify for.      -Spoke with pt in the evening and updated him on his referral status. Also informed him that writer has not heard from his . Pt used explicit language and writer redirected him.      Discharge Plan or Goal  Pt is new and is undergoing stabilization. He is currently homeless      Barriers to Discharge   Safe discharge plan, ongoing symptom severity (highly disorganized, and having psychosis in the context of hearing voices), and medication evaluation/assessment.     Referral Status  None  Legal Status  72 hour hold

## 2023-01-05 NOTE — PLAN OF CARE
Problem: Adult Behavioral Health Plan of Care  Goal: Plan of Care Review  Outcome: Progressing  Flowsheets (Taken 1/5/2023 1200)  Patient Agreement with Plan of Care:    agrees    other (see comments)     Problem: Suicidal Behavior  Goal: Suicidal Behavior is Absent or Managed  Outcome: Progressing   Goal Outcome Evaluation:    Plan of Care Reviewed With: patient    Patient is casually groomed, calm, affect brightens up with interaction. Patent's speech is soft quiet, cooperative and appropriate. Patient keeps self isolated and withdrawn, verbalizes concerns through the help of interpretor. Patient denies  anxiety and depression, no SI/SIB/HI/AVH. Patient denies any other discomfort, no pain. Patient was meals and medication compliant, no side effects endorsed. No scheduled medication given during this shift, will continue to monitor.

## 2023-01-05 NOTE — PLAN OF CARE
01/05/23 1516   Group Therapy Session   Group Attendance attended group session   Time Session Began 1300   Time Session Ended 1430   Total Time (minutes) 45   Total # Attendees 2   Group Type life skill;psychoeducation;task skill   Group Topic Covered balanced lifestyle;cognitive activities;coping skills/lifestyle management;relapse prevention;problem-solving;structured socialization   Group Session Detail OT Topic Group-Week in Review   Patient Response/Contribution cooperative with task;able to recall/repeat info presented   Patient Participation Detail pt arrived late for group. pt was able to complete week in review handout with assistance of  to translate questions. pt readily shared insights and answers to questions with staff. pt shared he feels better since being here. pt shared his belief in God and that he likes to read the Bible. pt shared he feels the programming and groups are beneficial for his mental health.

## 2023-01-05 NOTE — PROGRESS NOTES
Pt was engaged in dance/movement therapy (D/MT)       01/05/23 2141   Expressive Therapy   Therapy Type dance/movement   Minutes of Treatment 50

## 2023-01-05 NOTE — PLAN OF CARE
01/05/23 1542   Group Therapy Session   Group Attendance attended group session   Time Session Began 1015   Time Session Ended 1105   Total Time (minutes) 50   Total # Attendees 5   Group Type expressive therapy;task skill;psychotherapeutic   Group Topic Covered coping skills/lifestyle management;problem-solving;cognitive activities;balanced lifestyle   Group Session Detail Occup[facility] group to facilitate coping skill exploration, use of cognitive skills and problem solving, creative expression, clinical observation and facilitation of social, cognitive, and kinesthetic performance skills.    Patient Response/Contribution cooperative with task;listened actively;organized   Patient Participation Detail Worked on a familiar step creative task. He took time to plan and organize and followed through to completion on his plans. He was pleasant on approach through the . Affect brightened some with interactions.

## 2023-01-06 ENCOUNTER — APPOINTMENT (OUTPATIENT)
Dept: INTERPRETER SERVICES | Facility: CLINIC | Age: 53
End: 2023-01-06
Attending: PSYCHIATRY & NEUROLOGY
Payer: MEDICAID

## 2023-01-06 PROCEDURE — 99231 SBSQ HOSP IP/OBS SF/LOW 25: CPT | Performed by: PSYCHIATRY & NEUROLOGY

## 2023-01-06 PROCEDURE — G0177 OPPS/PHP; TRAIN & EDUC SERV: HCPCS

## 2023-01-06 PROCEDURE — 124N000003 HC R&B MH SENIOR/ADOLESCENT

## 2023-01-06 PROCEDURE — H2032 ACTIVITY THERAPY, PER 15 MIN: HCPCS

## 2023-01-06 PROCEDURE — 250N000013 HC RX MED GY IP 250 OP 250 PS 637: Performed by: PSYCHIATRY & NEUROLOGY

## 2023-01-06 RX ADMIN — OLANZAPINE 10 MG: 10 TABLET, FILM COATED ORAL at 20:06

## 2023-01-06 ASSESSMENT — ACTIVITIES OF DAILY LIVING (ADL)
ADLS_ACUITY_SCORE: 32
LAUNDRY: UNABLE TO COMPLETE
ADLS_ACUITY_SCORE: 32
ADLS_ACUITY_SCORE: 32
DRESS: INDEPENDENT;SCRUBS (BEHAVIORAL HEALTH)
ADLS_ACUITY_SCORE: 32
ORAL_HYGIENE: INDEPENDENT
ADLS_ACUITY_SCORE: 32
HYGIENE/GROOMING: INDEPENDENT
DRESS: INDEPENDENT
ADLS_ACUITY_SCORE: 32
ORAL_HYGIENE: INDEPENDENT
ADLS_ACUITY_SCORE: 32
ADLS_ACUITY_SCORE: 32
HYGIENE/GROOMING: INDEPENDENT
ADLS_ACUITY_SCORE: 32
ADLS_ACUITY_SCORE: 32

## 2023-01-06 NOTE — PLAN OF CARE
01/06/23 1421   Group Therapy Session   Group Attendance attended group session   Time Session Began 1300   Time Session Ended 1345   Total Time (minutes) 45   Total # Attendees 3   Group Type life skill;recreation   Group Topic Covered balanced lifestyle;cognitive activities;coping skills/lifestyle management;problem-solving;relaxation techniques;self-care activities   Group Session Detail OT Wellness group-Chair yoga, self massage, and relaxation techniques   Patient Response/Contribution cooperative with task   Patient Participation Detail pt arrived to group independently. pt seemed willing to participate by following visual demonstration.  arrived. pt participated in approx 75% of seated yoga poses and 100% of self massage activities following visual demonstrations. pt engaged appropriately and seemed interested in topic

## 2023-01-06 NOTE — PLAN OF CARE
"  Problem: Adult Behavioral Health Plan of Care  Goal: Adheres to Safety Considerations for Self and Others  Outcome: Progressing  Note: Patient reported hearing voices telling him: You can't be here\", \"You have to leave\". He admitted that the voices are with derogatory content, but did not give examples. Patient reported that the voices are \"better\". Said he had them for 3 yrs. Denied voices telling him to hurt self or others. Patient admitted that he was going into traffic, prior to admission, as the voices were telling him to do so. He said he understands that he does not have to listen to the voices, said he will not leave the unit until he is discharged. Patient was not observed to be going close to the exit doors or to be trying to leave the unit.   Said his depression and anxiety are \"little\".   Patient said he is from [place], said he does not have a family or friends here. Said he is homeless and stays inn shelters. Said he is not a citizen of [place], said he does not have money/income, or insurance. Said he worked as a  in the past, but no longer works.   Said he used drugs in the past, including meth, cocaine, and alcohol, but no longer uses. He was unable to recall when did he used last.     Patient spent the evening out in the unit. He was quiet, no interactions with staff or other pts, mostly due to language limitations.   Appetite is good, he ate 100% of his dinner, drinks fluids adequately.   Denied pain and all other physical issues.  Took scheduled Zyprexa 10 mg PO, no SE reported or assessed by staff.    used for the assessment.   /80 (BP Location: Left arm, Patient Position: Sitting, Cuff Size: Adult Regular)   Pulse 73   Temp 97.4  F (36.3  C) (Temporal)   Resp 17   Ht 1.626 m (5' 4\")   Wt 71.8 kg (158 lb 4.6 oz)   SpO2 100%   BMI 27.17 kg/m      "

## 2023-01-06 NOTE — PROGRESS NOTES
Pt appeared to be sleeping a total of about 8.5 hours. Pt is on SI precaution.  No concerns reported or noted this shift.

## 2023-01-06 NOTE — PLAN OF CARE
" 01/06/22 1200   Individualization/Patient Specific Goals   Patient Personal Strengths Interested and ready to engage in treatment.   Patient Vulnerabilities Reported that he is homeless and does not know how to ask for help. Reports: \"only God can help me.\"   Anxieties, Fears or Concerns Worried that \"the voices would not go away.\"   Interprofessional Rounds   Summary Pt was admitted due to concerns for his safety. 52 male who has been staying in shelters. He has at least one prior mental health admission and was admitted to [facility] in June of 2021 for similar presentation. He has more than one medical record due to inconsistent spelling of his last name ( Mackenzie vs Ordoñez) and confusion over whether Avalos is middle name or last name.    Pt will be engaged in the therapeutic milieu and groups where he will learn and practice positive coping skills to cope with his symptoms. Will be stabilized via medications and adjunctive interventions including psychotherapy, OT...      Participants    CTC;nursing; psychiatrist   Behavioral Team Discussion   Participants       Vasquez Ray MD;Nola Swartz RN  ; MARNIE Molina [facility];Juliana Chopra, OT  Plan:     Medication:    OLANZapine  10 mg Oral At Bedtime      Will use Zyprexa for psychosis and adjust dose as indicated     Consults: Hospitalist will be consulted if medical issues arise       Ongoing education given regarding diagnostic and treatment options with risks, benefits and alternatives and adequate verbalization of understanding.     Risk Assessment:  [facility] RISK ASSESSMENT: Patient on precautions      Progress New patient, co-operative with treatment.          "

## 2023-01-06 NOTE — PLAN OF CARE
01/06/23 1257   Group Therapy Session   Group Attendance attended group session   Time Session Began 1015   Time Session Ended 1145   Total Time (minutes) 70   Total # Attendees 3   Group Type task skill;recreation   Group Topic Covered cognitive activities;coping skills/lifestyle management;leisure exploration/use of leisure time   Group Session Detail OT Clinic Group   Patient Response/Contribution cooperative with task;organized   Patient Participation Detail pt arrived to group with  present. pt engaged in novel task. pt asked relevant questions. pt was independent with task and was able to follow therapist's verbal instructions without further assist. pt worked quietly and diligently for duration of group

## 2023-01-06 NOTE — PROGRESS NOTES
Interim History:     Patient seen and chart reviewed. Case reviewed in multi-disciplinary treatment team.      According to Nursing report: Patient is visible on unit. He attends groups with interpretor and will participate and engages when interpretor is present. He appears cheerful.        According to Nursing notes:  Patient is casually groomed, calm, affect brightens up with interaction. Patent's speech is soft quiet, cooperative and appropriate. Patient keeps self isolated and withdrawn, verbalizes concerns through the help of interpretor. Patient denies  anxiety and depression, no SI/SIB/HI/AVH. Patient denies any other discomfort, no pain. Patient was meals and medication compliant, no side effects endorsed. No scheduled medication given during this shift, will continue to monitor.   Pt was out in the lounge, coloring at start of shift. Pt presents with a flat affect, calm mood. Pt denies all MH symptoms including SI/SIB/HI/AVH at this time. Pt states auditory hallucinations have significantly reduced and denies any forms of hallucinations this shift. Pt reports he is having a good day, denies any other physical concerns at this time. Pt attended group (paul), with the  present. He remained in the lounge for the most part, coloring and at times watching TV, but mostly withdrawn to self.   Pt is eating and drinking adequately, medication compliant, VSS.   Pt appeared to be sleeping a total of about 8.5 hours. Pt is on SI precaution.  No concerns reported or noted this shift.             According to :  Assessment/Intervention/Current Symtoms and Care Coordination  CTC (writer)  reviewed pt's chart.   -Pt signed ROIs yesterday with an  for Emergency MA.  -Ongoing stabilization. Was refrred also for [place] [facility]  Discharge Plan or Goal  Pt is new and is undergoing stabilization. He is currently homeless   Barriers to Discharge   Safe discharge plan, ongoing symptom severity  "(highly disorganized, and having psychosis in the context of hearing voices), and medication evaluation/assessment.  Referral Status  None  Legal Status  Voluntary      On interview today: Patient denies suicidal or homicidal thoughts and denies hallucinations. Patient denies side effects to medications.  Patient is pleasant and more cheerful. He denies depression now. He reports that hallucinations have improved substantially and are not bothering him nearly as much as admission. He is agreeable to stay in hospital for now    ROS: Patient has no other physical complaints today.            Vital signs:  Temp: 97.4  F (36.3  C) Temp src: Oral BP: 121/80 Pulse: 77   Resp: 17 SpO2: 100 % O2 Device: None (Room air)   Height: 162.6 cm (5' 4\") Weight: 71.8 kg (158 lb 4.6 oz)  Estimated body mass index is 27.17 kg/m  as calculated from the following:    Height as of this encounter: 1.626 m (5' 4\").    Weight as of this encounter: 71.8 kg (158 lb 4.6 oz).         MSE:  Mental Status  Patient is casually dressed  Hygiene good  Speech fluent  Thought Process concrete  Thought Content: denies suicidal ideation,    No homicidal ideation,   No ideas of reference,    No loose associations,    less auditory hallucinations,     No visual hallucinations   No delusions  Psychomotor: + slowing  Cognition:  Alert and oriented to time place and person  Attention good  Concentration good  Memory normal including recent and remote memory  Mood:  euthymic  Affect: mood congruent  Judgement: limited  Eye contact good  Cooperation good  Language normal  Fund of knowledge normal  Musculoskeletal normal gait with no abnormal movements    Minimal change in mental status in the past 24 hours          HPI:   From initial H and P  52 male who has been staying in shelters. He has at least one prior mental health admission and was admitted to [facility] in June of 2021 for similar presentation. He has more than one medical record due to " inconsistent spelling of his last name ( Gurdeep vs Tiago) and confusion over whether Robbie is middle name or last name.    Patient was brought to hospital by police due to wandering in traffic. Patient suffers from chronic hallucinations, and has not been taking medications for at least several months.     Patient is cooperative to interview. He reports that auditory hallucinations have diminished since resuming Zyprexa in ER. He denies specific suicidal intent. He denies homicidal thoughts. He can not give substantial details about prior mental health treatment.    According to ER report by   Eben Carlson MD  12/29/22 2212  Juan Ordoñez is a 52 year old male with apparent history of psychosis who presents for evaluation after abnormal behavior this evening.  The patient was reportedly in street attempting to run in front of cars.  He was guarded with police who picked him up and brought him here but indicated to an ED RN that he is having voices that are telling him to hurt himself including to run in front of cars.  In conversations with me, the patient denies any concerns but is evasive regarding why he was in the street.  Medical Decision Making:  This is a 52 year old male who presents with acute suicidal behavior.  He is not intoxicated. There is no metabolic acidosis to suggest toxic alcohol ingestion and ethanol level is negative.  The patient is medically cleared for further mental health care.  Given the patient's history of psychosis and his guarded history that he provides, I am concerned that he indeed is suicidal.  Patient handed off to my partner pending placement to a psychiatric bed.  We will restart Zydis as the patient has been on this previously but stopped taking this.      According to DEC assessment done in ER by Jenny Vincent, LICSW, MSW, LICSW, Psychotherapist  DEC - Triage & Transition Services  Summary of Patient Situation  Patient arrived via EMS after being found at  an intersection attempting to jump in front of traffic. Patient was waiting at the intersection and would run out in front of the cars as they started to move. He told triage that he hears voices and that the voices told him to go into the road.   Met with pt with . He presents with flat affect and appears guarded. He appears distracted with possible internal preoccupation and is unable or willing to provide a good history.   He admits that he was walking into traffic in an intersection. He denies that he was attempting to end his life. When asked what he was trying to do he states 'I don't know how to explain to you' and is not able to expand on this at all, even when asked later.   He reports hearing constant voices that say 'offensive' things that are often racist. He denies that the voices told him to walk into traffic but when asked if he was feeling distressed by the voices leading to the incident he says 'yes.' He denies SI/HI/plan/intent. Denies hx suicide attempts. He does not seem to care if he is admitted or not and tells writer to decide. He is wiling to come in to a MH unit.    Brief Psychosocial History  Pt reports that he is single. He is from [place] and states he has lived in [place] for 10+ years. He has two daughters and they, along with all of his family, still live in [place]. He denies having any friends or family here in [place]. He is currently homeless and has been staying at a shelter in [place]. He is unemployed.   Significant Clinical History  Pt states that he does not know about his mental health history. When reminded of an inpatient MH admission in 2021, he does recall this. Per chart, pt was admitted to [facility] for inpatient MH in June 2021 for psychosis and was started on Zyprexa. When asked today, he reports that he took it until he ran out but then never got refills. He has no current providers.   Per chart, pt had reported substance use at previous admission  with cannabis, cocaine and methamphetamine. He denies any recent use today.               Past Psychiatric History:     At least one prior admission in June of 2021        Substance Use and History:     Denies alcohol or drug use  .         Current Medications:       OLANZapine  10 mg Oral At Bedtime     acetaminophen, alum & mag hydroxide-simethicone, hydrOXYzine, ibuprofen, OLANZapine **OR** OLANZapine, senna-docusate, traZODone         Allergies:   No Known Allergies       Labs:     No results found for this or any previous visit (from the past 48 hour(s)).             Diagnoses:   Schizophrenia, schizoaffective, chronic with acute exacerbation (H)    Patient Active Problem List   Diagnosis     Schizophrenia, schizoaffective, chronic with acute exacerbation (H)              Assessment:     Patient with schizoaffective disorder and homelessness presents with worsening hallucinations and dangerous behavior in the context of no current medication use. He continues to have psychosis         Plan:     Legal:72 hour hold but patient is willing to stay voluntary.    Medication:    OLANZapine  10 mg Oral At Bedtime     Will use Zyprexa for psychosis and adjust dose as indicated    Consults: Hospitalist will be consulted if medical issues arise    Multidisciplinary Interventions:  to gather collateral information, coordinate care with outpatient providers and begin follow up planning and assess options for services      Disposition: assess options for placement    No further change in treatment plan  Patient seen, chart reviewed, case reviewed with  and with nursing.   Case reviewed in multi-disciplinary treatment team.      Vasquez Ray MD

## 2023-01-06 NOTE — PLAN OF CARE
Problem: Adult Behavioral Health Plan of Care  Goal: Adheres to Safety Considerations for Self and Others  Intervention: Develop and Maintain Individualized Safety Plan  Recent Flowsheet Documentation  Taken 1/6/2023 0953 by Nola Swartz RN  Safety Measures:   suicide check-in completed   safety rounds completed   environmental rounds completed     Problem: Psychotic Signs/Symptoms  Goal: Improved Behavioral Control (Psychotic Signs/Symptoms)  Outcome: Progressing  Flowsheets (Taken 1/6/2023 1001)  Mutually Determined Action Steps (Improved Behavioral Control): verbalizes gratifying activity     Problem: Psychotic Signs/Symptoms  Goal: Improved Psychomotor Symptoms (Psychotic Signs/Symptoms)  Intervention: Manage Psychomotor Movement  Recent Flowsheet Documentation  Taken 1/6/2023 0953 by Nola Swartz RN  Patient Performed Hygiene: dressed  Activity (Behavioral Health): up ad jessica     Problem: Suicidal Behavior  Goal: Suicidal Behavior is Absent or Managed  Outcome: Met   Goal Outcome Evaluation:    Plan of Care Reviewed With: patient      Writer met with patient and interpretor for 1:1. Pt denies depression, anxiety, SI/SIB/wishes to be dead. Pt reports that his mood has improved since admission. Pt reports continued auditory hallucinations but reports that these have decreased since admission. Pt's affect is appropriate and he smiles during interactions. Pt reports that he has been eating and sleeping well. Pt denies issues with pain, constipation or diarrhea.   Pt is visible in common areas and attends programming. Pt has minimal interactions with peers in milieu which is mostly likely due to language barrier. Pt reports that he enjoys groups.

## 2023-01-06 NOTE — PLAN OF CARE
"  Problem: Psychotic Signs/Symptoms  Goal: Improved Behavioral Control (Psychotic Signs/Symptoms)  Outcome: Progressing   Goal Outcome Evaluation:    Plan of Care Reviewed With: patient        Pt was out in the lounge, coloring at start of shift. Pt presents with a flat affect, calm mood. Pt denies all MH symptoms including SI/SIB/HI/AVH at this time. Pt states auditory hallucinations have significantly reduced and denies any forms of hallucinations this shift. Pt reports he is having a good day, denies any other physical concerns at this time. Pt attended group (paul), with the  present. He remained in the lounge for the most part, coloring and at times watching TV, but mostly withdrawn to self.   Pt is eating and drinking adequately, medication compliant, VSS.       /75 (BP Location: Right arm, Patient Position: Standing, Cuff Size: Adult Regular)   Pulse 87   Temp 97.4  F (36.3  C) (Temporal)   Resp 18   Ht 1.626 m (5' 4\")   Wt 71.8 kg (158 lb 4.6 oz)   SpO2 96%   BMI 27.17 kg/m          "

## 2023-01-06 NOTE — PLAN OF CARE
Assessment/Intervention/Current Symtoms and Care Coordination  CTC (writer)  reviewed pt's chart.   -Pt signed ROIs yesterday with an  for Emergency MA.  -Ongoing stabilization. Was refrred also for [place] [facility]      Discharge Plan or Goal  Pt is new and is undergoing stabilization. He is currently homeless      Barriers to Discharge   Safe discharge plan, ongoing symptom severity (highly disorganized, and having psychosis in the context of hearing voices), and medication evaluation/assessment.     Referral Status  None  Legal Status  Voluntary

## 2023-01-07 PROCEDURE — H2032 ACTIVITY THERAPY, PER 15 MIN: HCPCS

## 2023-01-07 PROCEDURE — 250N000013 HC RX MED GY IP 250 OP 250 PS 637: Performed by: PSYCHIATRY & NEUROLOGY

## 2023-01-07 PROCEDURE — G0177 OPPS/PHP; TRAIN & EDUC SERV: HCPCS

## 2023-01-07 PROCEDURE — 124N000003 HC R&B MH SENIOR/ADOLESCENT

## 2023-01-07 RX ADMIN — OLANZAPINE 10 MG: 10 TABLET, FILM COATED ORAL at 20:47

## 2023-01-07 ASSESSMENT — ACTIVITIES OF DAILY LIVING (ADL)
ADLS_ACUITY_SCORE: 32
ORAL_HYGIENE: INDEPENDENT
ADLS_ACUITY_SCORE: 32
DRESS: INDEPENDENT;SCRUBS (BEHAVIORAL HEALTH)
HYGIENE/GROOMING: INDEPENDENT
ADLS_ACUITY_SCORE: 32

## 2023-01-07 NOTE — PROGRESS NOTES
01/06/23 2000    Group Therapy Session   Time Session Began 1900   Time Session Ended 1955   Total Time (minutes) 45   Total # Attendees 6   Group Type expressive therapy   Group Topic Covered coping skills/lifestyle management;self-care activities;relaxation techniques   Group Session Detail Evening Relaxation   Patient Response/Contribution cooperative with task   Patient Participation Detail Cooperatively engaged in Evening Music Relaxation group to decrease anxiety and promote sleep.  Calm affect, appropriately engaged in session, responding well to the music.  Does have somewhat of a language barrier.  Pleasant.

## 2023-01-07 NOTE — PLAN OF CARE
Occupational Therapy group discussion     01/07/23 1517   Group Therapy Session   Group Attendance attended group session   Time Session Began 1120   Time Session Ended 1210   Total Time (minutes) 50   Total # Attendees 6   Group Type psychoeducation;life skill   Group Topic Covered coping skills/lifestyle management;relationship;emotions/expression   Group Session Detail General Wellness and Coping Skills: group discussion on how to ask for help   Patient Participation Detail Pt attended group discussion exploring strategies on how to ask others for help.  was not present to assist pt in group discussion. Pt sat quietly at a chair in the corner of the room; pt appeared engaged, making eye contact with writer and appearing attentive to peers during the group discussion.

## 2023-01-07 NOTE — PLAN OF CARE
"Problem: Psychotic Signs/Symptoms  Goal: Improved Behavioral Control (Psychotic Signs/Symptoms)  Outcome: Progressing   Goal Outcome Evaluation:    Pt slept for a total of 7.75 hour. No health/behavior concerns reported/observed.     /80 (BP Location: Left arm, Patient Position: Sitting, Cuff Size: Adult Regular)   Pulse 73   Temp 97.4  F (36.3  C) (Temporal)   Resp 17   Ht 1.626 m (5' 4\")   Wt 71.8 kg (158 lb 4.6 oz)   SpO2 100%   BMI 27.17 kg/m      8      "

## 2023-01-07 NOTE — PLAN OF CARE
"  Problem: Adult Behavioral Health Plan of Care  Goal: Absence of New-Onset Illness or Injury  Intervention: Identify and Manage Fall Risk  Recent Flowsheet Documentation  Taken 1/7/2023 1107 by Nola Swartz RN  Safety Measures:   suicide check-in completed   safety rounds completed   environmental rounds completed     Problem: Psychotic Signs/Symptoms  Goal: Improved Mood Symptoms  Outcome: Progressing  Flowsheets (Taken 1/7/2023 1110)  Mutually Determined Action Steps (Improved Mood Symptoms): acknowledges progress     Problem: Psychotic Signs/Symptoms  Goal: Improved Psychomotor Symptoms (Psychotic Signs/Symptoms)  Intervention: Manage Psychomotor Movement  Recent Flowsheet Documentation  Taken 1/7/2023 1107 by Nola Swartz RN  Patient Performed Hygiene: dressed  Activity (Behavioral Health): up ad jessica     Problem: Psychotic Signs/Symptoms  Goal: Improved Sleep (Psychotic Signs/Symptoms)  Outcome: Progressing  Flowsheets (Taken 1/7/2023 1110)  Mutually Determined Action Steps (Improved Sleep): sleeps 4-6 hours at night   Goal Outcome Evaluation:    Plan of Care Reviewed With: patient      Writer met with patient and interpretor. Pt reports \"a little depression\" and \"a little anxiety\". Pt denies SI/SIB. Pt continues to report auditory hallucinations. Pt reports that these are slowly decreasing with medications.   Pt oriented to the hospital but unable to identify the name of the hospital. Pt oriented to month of January, year of 2023. Pt unable to state the exact date or day of the week.  Pt was able to state that he was in [facility].   Pt denies pain, constipation or issues with urination.  Pt has been eating well and reports that he has been sleeping well.                  "

## 2023-01-08 PROCEDURE — 250N000013 HC RX MED GY IP 250 OP 250 PS 637: Performed by: PSYCHIATRY & NEUROLOGY

## 2023-01-08 PROCEDURE — 124N000003 HC R&B MH SENIOR/ADOLESCENT

## 2023-01-08 RX ADMIN — OLANZAPINE 10 MG: 10 TABLET, FILM COATED ORAL at 20:51

## 2023-01-08 RX ADMIN — HYDROXYZINE HYDROCHLORIDE 25 MG: 25 TABLET, FILM COATED ORAL at 20:51

## 2023-01-08 ASSESSMENT — ACTIVITIES OF DAILY LIVING (ADL)
ORAL_HYGIENE: INDEPENDENT
ADLS_ACUITY_SCORE: 32
DRESS: INDEPENDENT
ADLS_ACUITY_SCORE: 32
LAUNDRY: UNABLE TO COMPLETE
ADLS_ACUITY_SCORE: 32
HYGIENE/GROOMING: INDEPENDENT
ADLS_ACUITY_SCORE: 32

## 2023-01-08 NOTE — PLAN OF CARE
Problem: Adult Behavioral Health Plan of Care  Goal: Adheres to Safety Considerations for Self and Others  Intervention: Develop and Maintain Individualized Safety Plan  Recent Flowsheet Documentation  Taken 1/8/2023 1032 by Nola Swartz RN  Safety Measures:    suicide check-in completed    safety rounds completed    environmental rounds completed     Problem: Psychotic Signs/Symptoms  Goal: Improved Psychomotor Symptoms (Psychotic Signs/Symptoms)  Intervention: Manage Psychomotor Movement  Recent Flowsheet Documentation  Taken 1/8/2023 1032 by Nola Swartz RN  Patient Performed Hygiene: dressed  Activity (Behavioral Health): up ad jessica     Problem: Sleep Disturbance  Goal: Adequate Sleep/Rest  Outcome: Progressing   Goal Outcome Evaluation:    Plan of Care Reviewed With: patient      Pt up early and present in the lounge for breakfast. Pt ate well. Pt smiled on approach.        Writer met with patient and interpretor this afternoon. Pt denies depression, anxiety, SI/SIB. Pt continues to report auditory hallucinations, but reports that these have decreased since admission. Pt reports that he is sleeping well and that he has been getting enough to eat. Pt denies pain. Pt affect is flat with inconsistent eye contact. Pt is interactive and engaged in conversation when interpretor is present.   Pt oriented to January, 2023, [facility], [place], and [place].   Pt has been visible in common areas. Pt playing darwin's in the lounge this afternoon by himself.

## 2023-01-08 NOTE — PLAN OF CARE
Problem: Sleep Disturbance  Goal: Adequate Sleep/Rest  Outcome: Progressing   Goal Outcome Evaluation:    Patient slept 8.25 hours during the shift, had no concern reported or noted. Patient continue to be SI precaution. Will continue to monitor.

## 2023-01-08 NOTE — PLAN OF CARE
Shift Summary  Legal status: voluntary   Mental Status  This shift assessment has been done via a Interpretor. Pt is alert and oriented to self, place and date. Visible in milieu mos of this shift, working on words puzzle. Isolative and socially withdrawn to self possibly due to language barrier. Pleasant. Able to communicate needs with some English. Mood is calm with appropriate affect. Denies SI, SIB, AH,VH, depression and anxiety. Pt is in touch with reality. No signs of delusion or psychosis noted.  Feels safe in hospital. Took scheduled medication with no difficulties. No side effect reported by the pt or observed by this writer. Insight is ok. Plan of care was reviewed with pt. Pt verbalized understanding the plan and agrees with it. Participated in therapeutic group activity.   Prn: none  Precaution: SI  Physical Status  Moves around with no difficulties.   Hygiene is appropriate. Took shower this evening.   Appetite adequate. Ate dinner.   Pt reported no problem with bowel and bladder.   Denies pain.   Today's Lab orders: none  Prn: none  Continue to monitor pt's status Q 15 minutes and stabilize the patient's symptoms with the use of medications and therapeutic programming.

## 2023-01-09 PROCEDURE — 250N000013 HC RX MED GY IP 250 OP 250 PS 637: Performed by: PSYCHIATRY & NEUROLOGY

## 2023-01-09 PROCEDURE — 90853 GROUP PSYCHOTHERAPY: CPT

## 2023-01-09 PROCEDURE — 99232 SBSQ HOSP IP/OBS MODERATE 35: CPT | Performed by: PSYCHIATRY & NEUROLOGY

## 2023-01-09 PROCEDURE — G0177 OPPS/PHP; TRAIN & EDUC SERV: HCPCS

## 2023-01-09 PROCEDURE — 124N000003 HC R&B MH SENIOR/ADOLESCENT

## 2023-01-09 RX ADMIN — OLANZAPINE 12.5 MG: 10 TABLET, FILM COATED ORAL at 21:17

## 2023-01-09 RX ADMIN — HYDROXYZINE HYDROCHLORIDE 25 MG: 25 TABLET, FILM COATED ORAL at 21:17

## 2023-01-09 ASSESSMENT — ACTIVITIES OF DAILY LIVING (ADL)
ADLS_ACUITY_SCORE: 32
LAUNDRY: UNABLE TO COMPLETE
LAUNDRY: UNABLE TO COMPLETE
HYGIENE/GROOMING: INDEPENDENT
ADLS_ACUITY_SCORE: 32
DRESS: INDEPENDENT
ORAL_HYGIENE: INDEPENDENT
ADLS_ACUITY_SCORE: 32
HYGIENE/GROOMING: INDEPENDENT
ADLS_ACUITY_SCORE: 32
ORAL_HYGIENE: INDEPENDENT
DRESS: INDEPENDENT;SCRUBS (BEHAVIORAL HEALTH)
ADLS_ACUITY_SCORE: 32

## 2023-01-09 NOTE — PLAN OF CARE
01/09/23 1432   Group Therapy Session   Group Attendance attended group session   Time Session Began 1015   Time Session Ended 1115   Total Time (minutes) 55   Total # Attendees 7   Group Type recreation;task skill   Group Topic Covered cognitive activities;coping skills/lifestyle management;problem-solving;leisure exploration/use of leisure time   Group Session Detail Occupational Therapy [facility] group to facilitate coping skill exploration, use of cognitive skills and problem solving, creative expression, clinical observation and facilitation of social, cognitive, and kinesthetic performance skills.   Patient Response/Contribution cooperative with task;organized   Patient Participation Detail pt was polite and pleasant with staff. pt chose to engage in previous, familiar painting task. pt was independent with supplies and clean up. pt worked quietly-no  present for group.

## 2023-01-09 NOTE — PLAN OF CARE
"  Problem: Adult Behavioral Health Plan of Care  Goal: Absence of New-Onset Illness or Injury  Intervention: Identify and Manage Fall Risk  Recent Flowsheet Documentation  Taken 1/9/2023 1526 by Nola Swartz RN  Safety Measures:   suicide check-in completed   safety rounds completed   environmental rounds completed     Problem: Psychotic Signs/Symptoms  Goal: Improved Psychomotor Symptoms (Psychotic Signs/Symptoms)  Intervention: Manage Psychomotor Movement  Recent Flowsheet Documentation  Taken 1/9/2023 1526 by Nola Swartz RN  Patient Performed Hygiene: dressed  Activity (Behavioral Health): up ad jessica     Problem: Sleep Disturbance  Goal: Adequate Sleep/Rest  Outcome: Progressing     Problem: Depression  Goal: Improved Mood  Outcome: Progressing   Goal Outcome Evaluation:    Plan of Care Reviewed With: patient      Writer met with patient and interpretor this afternoon. Pt reports improved mood since admission. Pt reports \" a little depression\" and \"a little anxiety\". Pt denies SI/SIB/wishes to to dead. Pt reports continued auditory hallucinations but reports that these have lessened since admission. Pt does not seem as forgetful as last week. Pt was oriented to [facility], January,9th, 2023, Monday, [place] and [place]. Pt did not remember Dr. Rincon from [facility].   Pt has been eating well and reports that he has been sleeping well. Pt denies pain.                 "

## 2023-01-09 NOTE — PLAN OF CARE
Problem: Sleep Disturbance  Goal: Adequate Sleep/Rest  Outcome: Progressing   Goal Outcome Evaluation:         Slept uninterrupted for 8 hours  No concerns raised

## 2023-01-09 NOTE — PROGRESS NOTES
01/08/23 2100   Group Therapy Session   Group Attendance attended group session   Time Session Began 1900   Time Session Ended 2000   Total Time (minutes) 60   Total # Attendees 2   Group Type expressive therapy   Group Topic Covered emotions/expression   Patient Response/Contribution cooperative with task       Art Therapy directive was to create a water color painting inspired by a positive personal intention for this evening/the week ahead. Pts were given a handout of personal intention examples.  Goals of directive: mindfulness, future-focused directive, emotional expression, emotional regulation, media exploration.  Pt was an engaged participant, focused on task for the full duration of group. Pt created a painting of what he described as a family cook-out/picnic. Pt elis a family engaged in activities such as playing soccer and flying a kite. Pt said that drawing was somewhat inspired by his own experiences/memories.  Pts mood was calm, pleasant participant.

## 2023-01-09 NOTE — PLAN OF CARE
01/09/23 1433   Group Therapy Session   Group Attendance attended group session   Time Session Began 1300   Time Session Ended 1345   Total Time (minutes) 45   Total # Attendees 4   Group Type psychoeducation;psychotherapeutic   Group Topic Covered coping skills/lifestyle management;cognitive therapy techniques;structured socialization;balanced lifestyle   Group Session Detail  Activity topic of identifying strengths and goals and plans in one's life    Patient Response/Contribution cooperative with task;listened actively;expressed understanding of topic;discussed personal experience with topic;organized   Patient Participation Detail  was present for this group. He was quick to speak up with content in context and insight. He talked about the year he lived on the streets but feels he gained from this as he stopped using drugs and grew from the experience and is healthier now. He stated appreciating all the help he is receiving here and appreciates the opportunity to attend groups and learn. He is pleasant, seems interested and attentive, and engaged.

## 2023-01-09 NOTE — PLAN OF CARE
"Problem: Depression  Goal: Improved Mood  Outcome: Progressing   Goal Outcome Evaluation:  Plan of Care Reviewed With: patient      Patient is alert and oriented x4. VSS. Pt reports forgetfulness and said \"I am conscious and feeling unease about my forgetfulness\". Pt was visible in the milieu playing board games by himself. Pt attended group with the . Pt is isolative and withdrawn due to language barrier. Pt endorsed auditory hallucination; however, pt reports the voices are lesser now. Pt reports voices are telling him \"I need to move and I can not be at the same place\". Pt endorsed depression and rated 3/10. Pt denied other mental health symptoms. Patient described mood as \"depressed\" and affect was flat. PRN hydroxyzine administered. Patient is cooperative with nursing cares. Patient is med-compliant, and ate 50% of his dinner. Reports \"sleeps through the night\". Patient evaluation continues.     VS reviewed: /71 (BP Location: Right arm, Patient Position: Sitting, Cuff Size: Adult Regular)   Pulse 90   Temp 98.1  F (36.7  C) (Temporal)   Resp 16   Ht 1.626 m (5' 4\")   Wt (P) 70.2 kg (154 lb 12.2 oz)   SpO2 99%   BMI (P) 26.57 kg/m   .         Length of stay: 9                                                                              "

## 2023-01-10 ENCOUNTER — APPOINTMENT (OUTPATIENT)
Dept: INTERPRETER SERVICES | Facility: CLINIC | Age: 53
End: 2023-01-10
Payer: MEDICAID

## 2023-01-10 LAB
ALBUMIN SERPL-MCNC: 3.6 G/DL (ref 3.4–5)
ALP SERPL-CCNC: 95 U/L (ref 40–150)
ALT SERPL W P-5'-P-CCNC: 19 U/L (ref 0–70)
ANION GAP SERPL CALCULATED.3IONS-SCNC: <1 MMOL/L (ref 3–14)
AST SERPL W P-5'-P-CCNC: 16 U/L (ref 0–45)
BASOPHILS # BLD AUTO: 0.1 10E3/UL (ref 0–0.2)
BASOPHILS NFR BLD AUTO: 1 %
BILIRUB SERPL-MCNC: 0.3 MG/DL (ref 0.2–1.3)
BUN SERPL-MCNC: 12 MG/DL (ref 7–30)
CALCIUM SERPL-MCNC: 9.3 MG/DL (ref 8.5–10.1)
CHLORIDE BLD-SCNC: 106 MMOL/L (ref 94–109)
CHOLEST SERPL-MCNC: 165 MG/DL
CO2 SERPL-SCNC: 34 MMOL/L (ref 20–32)
CREAT SERPL-MCNC: 0.69 MG/DL (ref 0.66–1.25)
EOSINOPHIL # BLD AUTO: 0.4 10E3/UL (ref 0–0.7)
EOSINOPHIL NFR BLD AUTO: 6 %
ERYTHROCYTE [DISTWIDTH] IN BLOOD BY AUTOMATED COUNT: 12.7 % (ref 10–15)
GFR SERPL CREATININE-BSD FRML MDRD: >90 ML/MIN/1.73M2
GLUCOSE BLD-MCNC: 128 MG/DL (ref 70–99)
HBA1C MFR BLD: 6.2 % (ref 0–5.6)
HCT VFR BLD AUTO: 44.4 % (ref 40–53)
HDLC SERPL-MCNC: 46 MG/DL
HGB BLD-MCNC: 14.5 G/DL (ref 13.3–17.7)
IMM GRANULOCYTES # BLD: 0 10E3/UL
IMM GRANULOCYTES NFR BLD: 0 %
LDLC SERPL CALC-MCNC: 83 MG/DL
LYMPHOCYTES # BLD AUTO: 1.3 10E3/UL (ref 0.8–5.3)
LYMPHOCYTES NFR BLD AUTO: 18 %
MCH RBC QN AUTO: 29.3 PG (ref 26.5–33)
MCHC RBC AUTO-ENTMCNC: 32.7 G/DL (ref 31.5–36.5)
MCV RBC AUTO: 90 FL (ref 78–100)
MONOCYTES # BLD AUTO: 0.5 10E3/UL (ref 0–1.3)
MONOCYTES NFR BLD AUTO: 7 %
NEUTROPHILS # BLD AUTO: 4.6 10E3/UL (ref 1.6–8.3)
NEUTROPHILS NFR BLD AUTO: 68 %
NONHDLC SERPL-MCNC: 119 MG/DL
NRBC # BLD AUTO: 0 10E3/UL
NRBC BLD AUTO-RTO: 0 /100
PLATELET # BLD AUTO: 295 10E3/UL (ref 150–450)
POTASSIUM BLD-SCNC: 4.4 MMOL/L (ref 3.4–5.3)
PROT SERPL-MCNC: 7.4 G/DL (ref 6.8–8.8)
RBC # BLD AUTO: 4.95 10E6/UL (ref 4.4–5.9)
SODIUM SERPL-SCNC: 140 MMOL/L (ref 133–144)
TRIGL SERPL-MCNC: 181 MG/DL
TSH SERPL DL<=0.005 MIU/L-ACNC: 1.41 MU/L (ref 0.4–4)
WBC # BLD AUTO: 6.9 10E3/UL (ref 4–11)

## 2023-01-10 PROCEDURE — 124N000003 HC R&B MH SENIOR/ADOLESCENT

## 2023-01-10 PROCEDURE — H2032 ACTIVITY THERAPY, PER 15 MIN: HCPCS

## 2023-01-10 PROCEDURE — 80053 COMPREHEN METABOLIC PANEL: CPT | Performed by: PSYCHIATRY & NEUROLOGY

## 2023-01-10 PROCEDURE — 83036 HEMOGLOBIN GLYCOSYLATED A1C: CPT | Performed by: PSYCHIATRY & NEUROLOGY

## 2023-01-10 PROCEDURE — 84443 ASSAY THYROID STIM HORMONE: CPT | Performed by: PSYCHIATRY & NEUROLOGY

## 2023-01-10 PROCEDURE — 93010 ELECTROCARDIOGRAM REPORT: CPT | Performed by: INTERNAL MEDICINE

## 2023-01-10 PROCEDURE — 80061 LIPID PANEL: CPT | Performed by: PSYCHIATRY & NEUROLOGY

## 2023-01-10 PROCEDURE — 85025 COMPLETE CBC W/AUTO DIFF WBC: CPT | Performed by: PSYCHIATRY & NEUROLOGY

## 2023-01-10 PROCEDURE — G0177 OPPS/PHP; TRAIN & EDUC SERV: HCPCS

## 2023-01-10 PROCEDURE — 250N000013 HC RX MED GY IP 250 OP 250 PS 637: Performed by: PSYCHIATRY & NEUROLOGY

## 2023-01-10 PROCEDURE — 36415 COLL VENOUS BLD VENIPUNCTURE: CPT | Performed by: PSYCHIATRY & NEUROLOGY

## 2023-01-10 PROCEDURE — 99232 SBSQ HOSP IP/OBS MODERATE 35: CPT | Performed by: PSYCHIATRY & NEUROLOGY

## 2023-01-10 RX ORDER — OLANZAPINE 15 MG/1
15 TABLET ORAL AT BEDTIME
Status: DISCONTINUED | OUTPATIENT
Start: 2023-01-10 | End: 2023-01-27

## 2023-01-10 RX ADMIN — OLANZAPINE 15 MG: 15 TABLET, FILM COATED ORAL at 20:11

## 2023-01-10 ASSESSMENT — ACTIVITIES OF DAILY LIVING (ADL)
ADLS_ACUITY_SCORE: 32

## 2023-01-10 NOTE — PLAN OF CARE
Problem: Sleep Disturbance  Goal: Adequate Sleep/Rest  Outcome: Progressing   Goal Outcome Evaluation:                      Slept well for 9 hours  Pt has labs today, NPO maintained  No concerns raised

## 2023-01-10 NOTE — PLAN OF CARE
Assessment/Intervention/Current Symtoms and Care Coordination  CTC (writer)  reviewed pt's chart.   -Ongoing stabilization. Was refrred also for [facility]  -Emergency MA assistance is pending      Discharge Plan or Goal  Pt is new and is undergoing stabilization. He is currently homeless      Barriers to Discharge   Safe discharge plan, ongoing symptom severity (highly disorganized, and having psychosis in the context of hearing voices), and medication evaluation/assessment.     Referral Status  None  Legal Status  Voluntary

## 2023-01-10 NOTE — PROGRESS NOTES
Pt participated in a support group utilizing dance/movement therapy (D/MT) interventions related to vitalizing organization.  Body-based, nonverbal expressions supported group therapeutic themes.  Individual expressions were the foundation for rhythmic and synchronous community joining.  Rolling, reaching, shaking, throwing and opening motions expressed psychotherapeutic themes.  Pt smiled and moved with fluidity.       01/10/23 1130   Expressive Therapy   Therapy Type dance/movement   Minutes of Treatment 50

## 2023-01-10 NOTE — PLAN OF CARE
"Problem: Psychotic Signs/Symptoms  Goal: Improved Behavioral Control (Psychotic Signs/Symptoms)  Outcome: Progressing   Goal Outcome Evaluation:  Plan of Care Reviewed With: patient      Patient is alert and oriented. VSS. Pt was visible in the milieu playing game by himself. Pt denied SI, SIB, and HI. Pt endorsed minimal depression and anxiety. Pt continues to endorse auditory hallucination. Patient described mood as \"normal\" and affect was flat. Patient is cooperative with cares. Patient is med-compliant, and ate 75% of his dinner. Administered PRN hydroxyzine. Reports \"sleeps through the night\". Patient evaluation continues.    VS reviewed: /75 (BP Location: Right arm, Patient Position: Sitting, Cuff Size: Adult Regular)   Pulse 95   Temp 98.5  F (36.9  C) (Oral)   Resp 18   Ht 1.626 m (5' 4\")   Wt (P) 70.2 kg (154 lb 12.2 oz)   SpO2 99%   BMI (P) 26.57 kg/m   .         Length of stay: 10                                                                                "

## 2023-01-10 NOTE — PLAN OF CARE
01/10/23 1445   Group Therapy Session   Group Attendance attended group session   Time Session Began 1015   Time Session Ended 1115   Total Time (minutes) 60   Total # Attendees 5   Group Type expressive therapy;psychotherapeutic;task skill   Group Topic Covered cognitive therapy techniques;cognitive activities;structured socialization;problem-solving;coping skills/lifestyle management   Group Session Detail [facility] group to facilitate coping skill exploration, use of cognitive skills and problem solving, creative expression, clinical observation and facilitation of social, cognitive, and kinesthetic performance skills.    Patient Response/Contribution cooperative with task;listened actively;organized   Patient Participation Detail Worked at a constant pace on familiar step tasks. Added detail when encouraged to do so and planned an organized steps. Was independent in gathering and cleaning up supplies. Pleasant and social through the  with comments mostly focused on his work during this session. Appeared work focused and comfortable.

## 2023-01-10 NOTE — PLAN OF CARE
"Occupational Therapy       01/10/23 1400   Group Therapy Session   Group Attendance attended group session   Time Session Began 1315   Time Session Ended 1415   Total Time (minutes) 60   Total # Attendees 4   Group Type psychoeducation   Group Topic Covered coping skills/lifestyle management;emotions/expression;structured socialization   Group Session Detail OT: Education on emotional wellness with creative hands on endeavor (Positive Thinking/Affirmations Calendars) to increase concentration, focus, attention to task/detail, task follow through, insight development, coping skills, mindset, and overall wellness   Patient Response/Contribution cooperative with task;organized;listened actively;other (see comments)  (pleasant; cooperative; engaged)   Patient Participation Detail Pt reported during check-in one positive thing about him is that he's \"attends group\" and is social. Pt sat among peers to complete creative hands on endeavor and was able to complete 75% of his calendar with positive things about himself. Pt worked in a neat and tidy manner to complete calendar and added additional detail/embellishments.  present.          "

## 2023-01-10 NOTE — PROGRESS NOTES
"PSYCHIATRY  PROGRESS NOTE     DATE OF SERVICE   01/10/2023  Patient progress notes have been reviewed.     CHIEF COMPLAINT   \" I am doing okay\".     SUBJECTIVE   Nursing reports:  Patient was visible in milieu in a clam mood, appears flat and withdrawn. He reports some anxiety and depression, denies SI/SIB. Patient reports auditory hallucination stating he hears two voices, one from his left side and one from right side. He states the voices tell him to move constantly and use offensive language. Patient denies other concerns. He attended group activities with , is eating and drinking adequately, denies any pain or discomfort.         Patient has been discussed with the  earlier today.    Assessment/Intervention/Current Symtoms and Care Coordination  CTC (writer)  reviewed pt's chart.   -Pt signed ROIs yesterday with an  for Emergency MA.  -Ongoing stabilization. Was refrred also [facility]     OBJECTIVE   Patient was seen and evaluated in the day area by himself, this was a face-to-face evaluation.  Interview was conducted in Hungarian by this writer.  Patient stated that today he is doing okay although he has continued to experience auditory hallucinations.  According to the patient the auditory hallucinations have been up and down.  At times these hallucinations can be very loud and scream at him.  Patient said that he has been trying to ignore the voices that are telling him he cannot be here.  Patient stated that he has not been following on what these voices are telling him and he is in agreement with continue to stay here in the hospital.  He is also interested in continue working with the  on his emergency and medical assistance application.    I did discuss with the patient the use of Zyprexa including reasons for prescribing this medication, benefits, risks and side effects.  I informed the patient that he has taken this medication in the past at 15 mg at " "bedtime.  I informed the patient that I was planning to increase it to 15 mg and the patient was in agreement with this.    Patient was not able to tell me exactly where he was staying before coming to the hospital.  I informed the patient that during his last admission to [facility] he had a friend involved.  The patient stated that he no longer has the phone number of this friend.  Patient also denied any recent use of alcohol or any drugs.  Patient is in agreement with us communicating with his friend if we can find a phone number.       MEDICATIONS   Medications:  Scheduled Meds:    OLANZapine  15 mg Oral At Bedtime     Continuous Infusions:  PRN Meds:.acetaminophen, alum & mag hydroxide-simethicone, hydrOXYzine, ibuprofen, OLANZapine **OR** OLANZapine, senna-docusate, traZODone    Medication adherence issues: MS Med Adherence Y/N: Yes, Hospitalization  Medication side effects: MEDICATION SIDE EFFECTS: no side effects reported  Benefit: Yes / No: Yes       ROS   A comprehensive review of systems was negative.       MENTAL STATUS EXAM   Vitals: /75 (BP Location: Right arm, Patient Position: Sitting, Cuff Size: Adult Regular)   Pulse 95   Temp 98.5  F (36.9  C) (Oral)   Resp 18   Ht 1.626 m (5' 4\")   Wt 73.8 kg (162 lb 11.2 oz)   SpO2 99%   BMI 27.93 kg/m      Same as last visit  Appearance:  No apparent distress  Mood:  {Mood: Normal  Affect: full range  was congruent to speech  Suicidal Ideation: PRESENT / ABSENT: absent   Homicidal Ideation: PRESENT / ABSENT: absent   Thought process: [place]   Thought content: devoid of  suicidal and violent ideation.  Still presenting with some auditory hallucinations.  Fund of Knowledge: Below average  Attention/Concentration: Fair  Language ability:  Intact  Memory:  Immediate recall impaired, Short-term memory impaired and Long-term memory intact  Insight:  fair.  Judgement: fair  Orientation: Yes, x4  Psychomotor Behavior: normal or unremarkable  "   Muscle Strength and Tone: MuscleStrength: Normal  Gait and Station: Normal       LABS   personally reviewed.   Results for orders placed or performed during the hospital encounter of 12/30/22 (from the past 24 hour(s))   EKG 12-lead, complete   Result Value Ref Range    Systolic Blood Pressure  mmHg    Diastolic Blood Pressure  mmHg    Ventricular Rate 73 BPM    Atrial Rate 73 BPM    DE Interval 148 ms    QRS Duration 84 ms     ms    QTc 423 ms    P Axis 64 degrees    R AXIS 1 degrees    T Axis 19 degrees    Interpretation ECG       Sinus rhythm  Normal ECG  No previous ECGs available     CBC with Platelets & Differential    Narrative    The following orders were created for panel order CBC with Platelets & Differential.  Procedure                               Abnormality         Status                     ---------                               -----------         ------                     CBC with platelets and d...[[id number]]                      Final result                 Please view results for these tests on the individual orders.   Comprehensive metabolic panel   Result Value Ref Range    Sodium 140 133 - 144 mmol/L    Potassium 4.4 3.4 - 5.3 mmol/L    Chloride 106 94 - 109 mmol/L    Carbon Dioxide (CO2) 34 (H) 20 - 32 mmol/L    Anion Gap <1 (L) 3 - 14 mmol/L    Urea Nitrogen 12 7 - 30 mg/dL    Creatinine 0.69 0.66 - 1.25 mg/dL    Calcium 9.3 8.5 - 10.1 mg/dL    Glucose 128 (H) 70 - 99 mg/dL    Alkaline Phosphatase 95 40 - 150 U/L    AST 16 0 - 45 U/L    ALT 19 0 - 70 U/L    Protein Total 7.4 6.8 - 8.8 g/dL    Albumin 3.6 3.4 - 5.0 g/dL    Bilirubin Total 0.3 0.2 - 1.3 mg/dL    GFR Estimate >90 >60 mL/min/1.73m2   TSH   Result Value Ref Range    TSH 1.41 0.40 - 4.00 mU/L   Lipid panel reflex to direct LDL   Result Value Ref Range    Cholesterol 165 <200 mg/dL    Triglycerides 181 (H) <150 mg/dL    Direct Measure HDL 46 >=40 mg/dL    LDL Cholesterol Calculated 83 <=100 mg/dL    Non HDL Cholesterol  119 <130 mg/dL    Narrative    Cholesterol  Desirable:  <200 mg/dL    Triglycerides  Normal:  Less than 150 mg/dL  Borderline High:  150-199 mg/dL  High:  200-499 mg/dL  Very High:  Greater than or equal to 500 mg/dL    Direct Measure HDL  Female:  Greater than or equal to 50 mg/dL   Male:  Greater than or equal to 40 mg/dL    LDL Cholesterol  Desirable:  <100mg/dL  Above Desirable:  100-129 mg/dL   Borderline High:  130-159 mg/dL   High:  160-189 mg/dL   Very High:  >= 190 mg/dL    Non HDL Cholesterol  Desirable:  130 mg/dL  Above Desirable:  130-159 mg/dL  Borderline High:  160-189 mg/dL  High:  190-219 mg/dL  Very High:  Greater than or equal to 220 mg/dL   Hemoglobin A1c   Result Value Ref Range    Hemoglobin A1C 6.2 (H) 0.0 - 5.6 %   CBC with platelets and differential   Result Value Ref Range    WBC Count 6.9 4.0 - 11.0 10e3/uL    RBC Count 4.95 4.40 - 5.90 10e6/uL    Hemoglobin 14.5 13.3 - 17.7 g/dL    Hematocrit 44.4 40.0 - 53.0 %    MCV 90 78 - 100 fL    MCH 29.3 26.5 - 33.0 pg    MCHC 32.7 31.5 - 36.5 g/dL    RDW 12.7 10.0 - 15.0 %    Platelet Count 295 150 - 450 10e3/uL    % Neutrophils 68 %    % Lymphocytes 18 %    % Monocytes 7 %    % Eosinophils 6 %    % Basophils 1 %    % Immature Granulocytes 0 %    NRBCs per 100 WBC 0 <1 /100    Absolute Neutrophils 4.6 1.6 - 8.3 10e3/uL    Absolute Lymphocytes 1.3 0.8 - 5.3 10e3/uL    Absolute Monocytes 0.5 0.0 - 1.3 10e3/uL    Absolute Eosinophils 0.4 0.0 - 0.7 10e3/uL    Absolute Basophils 0.1 0.0 - 0.2 10e3/uL    Absolute Immature Granulocytes 0.0 <=0.4 10e3/uL    Absolute NRBCs 0.0 10e3/uL       No results found for: PHENYTOIN, PHENOBARB, VALPROATE, CBMZ       DIAGNOSIS   Principal Problem:    Schizophrenia, schizoaffective, chronic with acute exacerbation (H)    Active Problem List:  Patient Active Problem List   Diagnosis     Schizophrenia, schizoaffective, chronic with acute exacerbation (H)          PLAN   1. Ongoing education given regarding diagnostic and  treatment options with risks, benefits and alternatives and adequate verbalization of understanding.  2.  Medications      Increase Zyprexa to 15 mg at bedtime    3.  Medical team to follow the patient as needed.  4.   coordinating a safe discharge plan with the patient.      Risk Assessment: [facility] RISK ASSESSMENT: Patient able to contract for safety    Coordination of Care:   Treatment Plan reviewed and physician signed, Care discussed with Care/Treatment Team Members, Chart reviewed and Patient seen      Re-Certification I certify that the inpatient psychiatric facility services furnished since the previous certification were, and continue to be, medically necessary for, either, treatment which could reasonably be expected to improve the patient s condition or diagnostic study and that the hospital records indicate that the services furnished were, either, intensive treatment services, admission and related services necessary for diagnostic study, or equivalent services.     I certify that the patient continues to need, on a daily basis, active treatment furnished directly by or requiring the supervision of inpatient psychiatric facility personnel.   I estimate 14 days of hospitalization is necessary for proper treatment of the patient. My plans for post-hospital care for this patient are  TBD     Paris Mccauley MD    -     01/10/2023  -     3:04 PM    Total time  35 minutes with > 50%spent on coordination of cares and psycho-education.    This note was created with help of Dragon dictation system. Grammatical / typing errors are not intentional.    Paris Mccauley MD

## 2023-01-10 NOTE — PROGRESS NOTES
"PSYCHIATRY  PROGRESS NOTE     DATE OF SERVICE   01/09/2023  Patient progress notes have been reviewed.     CHIEF COMPLAINT   \" I was hearing voices.\"       SUBJECTIVE   Nursing reports:  Writer met with patient and interpretor this afternoon. Pt reports improved mood since admission. Pt reports \" a little depression\" and \"a little anxiety\". Pt denies SI/SIB/wishes to to dead. Pt reports continued auditory hallucinations but reports that these have lessened since admission. Pt does not seem as forgetful as last week. Pt was oriented to [facility], January,9th, 2023, Monday, [place] and [place]. Pt did not remember Dr. Rincon from [facility].   Pt has been eating well and reports that he has been sleeping well. Pt denies pain.    Patient has been discussed with the  earlier today.   is assisting the patient with the application for emergency medical assistance and has been working with  on securing a more stable housing arrangement.       OBJECTIVE   Patient is a 52-year-old [place] man, he is single, has 2 adult daughters that are currently living in [place], patient is unemployed and homeless with no source of income.  Admitted to the psychiatric inpatient unit due to erratic behaviors and auditory hallucinations; in the context of medication noncompliance.  Patient is well known to this writer as he has been my patient several times in 2021 when he was admitted at [facility] psychiatric inpatient unit.    Today the patient was seen and evaluated in the day area by himself, this was a face-to-face evaluation.  Interview was conducted in Yoruba by this writer.  Patient was not able to remember this writer for being admitted at [facility].  Patient stated that prior to coming to the hospital he was hearing voices telling him to harm himself.  At one point he was walking in the middle of a busy road and and the police brought him to the hospital.  " "Now with the use of Zyprexa he has been feeling better and stated that the intensity of the auditory hallucinations have decreased.  Patient said that at times he has continued to hear voices but this is less stressful for him.  Patient tells me that at this time he does not have any supports in the community as both of his daughters are in [place] and he does not have their phone number.  He was used to communicate with them using [employer] but he no longer has his phone and does not remember the username or password.  Patient is currently undocumented and has no source of income.  Patient is aware that the  is helping him with the medical assistance application.  Currently the patient is in agreement with staying in the hospital voluntarily and with taking medications.  He denies having any thoughts of harming himself or harming others and has been able to contract for safety.     Labs have been reviewed.       MEDICATIONS   Medications:  Scheduled Meds:    OLANZapine  12.5 mg Oral At Bedtime     Continuous Infusions:  PRN Meds:.acetaminophen, alum & mag hydroxide-simethicone, hydrOXYzine, ibuprofen, OLANZapine **OR** OLANZapine, senna-docusate, traZODone    Medication adherence issues: MS Med Adherence Y/N: Yes, Hospitalization  Medication side effects: MEDICATION SIDE EFFECTS: no side effects reported  Benefit: Yes / No: Yes       ROS   A comprehensive review of systems was negative.       MENTAL STATUS EXAM   Vitals: /75 (BP Location: Right arm, Patient Position: Sitting, Cuff Size: Adult Regular)   Pulse 95   Temp 98.5  F (36.9  C) (Oral)   Resp 18   Ht 1.626 m (5' 4\")   Wt (P) 70.2 kg (154 lb 12.2 oz)   SpO2 99%   BMI (P) 26.57 kg/m      Appearance:  No apparent distress  Mood:  {Mood: Normal  Affect: full range  was congruent to speech  Suicidal Ideation: PRESENT / ABSENT: absent   Homicidal Ideation: PRESENT / ABSENT: absent   Thought process: [place]   Thought content: devoid of  " suicidal and violent ideation.  Still presenting with some auditory hallucinations.  Fund of Knowledge: Below average  Attention/Concentration: Fair  Language ability:  Intact  Memory:  Immediate recall impaired, Short-term memory impaired and Long-term memory intact  Insight:  fair.  Judgement: fair  Orientation: Yes, x4  Psychomotor Behavior: normal or unremarkable    Muscle Strength and Tone: MuscleStrength: Normal  Gait and Station: Normal       LABS   personally reviewed.   Admission on 12/29/2022, Discharged on 12/30/2022   Component Date Value Ref Range Status     Alcohol ethyl 12/29/2022 <0.01  <=0.01 g/dL Final     Sodium 12/29/2022 139  136 - 145 mmol/L Final     Potassium 12/29/2022 3.7  3.4 - 5.3 mmol/L Final     Chloride 12/29/2022 100  98 - 107 mmol/L Final     Carbon Dioxide (CO2) 12/29/2022 25  22 - 29 mmol/L Final     Anion Gap 12/29/2022 14  7 - 15 mmol/L Final     Urea Nitrogen 12/29/2022 15.3  6.0 - 20.0 mg/dL Final     Creatinine 12/29/2022 0.78  0.67 - 1.17 mg/dL Final     Calcium 12/29/2022 9.5  8.6 - 10.0 mg/dL Final     Glucose 12/29/2022 140 (H)  70 - 99 mg/dL Final     GFR Estimate 12/29/2022 >90  >60 mL/min/1.73m2 Final    Effective December 21, 2021 eGFRcr in adults is calculated using the 2021 CKD-EPI creatinine equation which includes age and gender (Homar zhong al., NEJ, DOI: 10.1056/MTHWjl6872484)     SARS CoV2 PCR 12/29/2022 Negative  Negative Final    NEGATIVE: SARS-CoV-2 (COVID-19) RNA not detected, presumed negative.     Amphetamines Urine 12/29/2022 Screen Negative  Screen Negative Final    Cutoff for a negative amphetamine is less than 500 ng/mL.     Barbituates Urine 12/29/2022 Screen Negative  Screen Negative Final    Cutoff for a negative barbiturate is less than 200 ng/mL.     Benzodiazepine Urine 12/29/2022 Screen Negative  Screen Negative Final    Cutoff for a negative benzodiazepine is less than 100 ng/mL.     Cannabinoids Urine 12/29/2022 Screen Negative  Screen Negative  Final    Cutoff for a negative cannabinoid is less than 50 ng/mL.     Cocaine Urine 12/29/2022 Screen Negative  Screen Negative Final    Cutoff for a negative cocaine is less than 300 ng/mL.     Opiates Urine 12/29/2022 Screen Negative  Screen Negative Final    Cutoff for a negative opiate is less than 300 ng/mL.       No results found for: PHENYTOIN, PHENOBARB, VALPROATE, CBMZ       DIAGNOSIS   Principal Problem:    Schizophrenia, schizoaffective, chronic with acute exacerbation (H)    Active Problem List:  Patient Active Problem List   Diagnosis     Schizophrenia, schizoaffective, chronic with acute exacerbation (H)          PLAN   1. Ongoing education given regarding diagnostic and treatment options with risks, benefits and alternatives and adequate verbalization of understanding.  2.  Medications      Increase Zyprexa to 12.5 mg at bedtime    3.  Medical team to follow the patient as needed.  4.   coordinating a safe discharge plan with the patient.  5.  Additional blood work has been ordered for tomorrow.  I have also ordered an EKG.      Risk Assessment: [facility] RISK ASSESSMENT: Patient able to contract for safety    Coordination of Care:   Treatment Plan reviewed and physician signed, Care discussed with Care/Treatment Team Members, Chart reviewed and Patient seen      Re-Certification I certify that the inpatient psychiatric facility services furnished since the previous certification were, and continue to be, medically necessary for, either, treatment which could reasonably be expected to improve the patient s condition or diagnostic study and that the hospital records indicate that the services furnished were, either, intensive treatment services, admission and related services necessary for diagnostic study, or equivalent services.     I certify that the patient continues to need, on a daily basis, active treatment furnished directly by or requiring the supervision of inpatient psychiatric  facility personnel.   I estimate 14 days of hospitalization is necessary for proper treatment of the patient. My plans for post-hospital care for this patient are  TBD     Paris Mccauley MD    -     01/09/2023  -     8:40 PM    Total time  35 minutes with > 50%spent on coordination of cares and psycho-education.    This note was created with help of Dragon dictation system. Grammatical / typing errors are not intentional.    Paris Mccauley MD

## 2023-01-10 NOTE — PROGRESS NOTES
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 He indicated her was a hard worker but that sometimes he needed rest. 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 He didn't draw a house as per the directive, the filed was his ho,me which was symbolic for his homelessness. He was a positive participant and discussed his thoughts with the help of his .

## 2023-01-10 NOTE — PLAN OF CARE
Problem: Psychotic Signs/Symptoms  Goal: Improved Behavioral Control (Psychotic Signs/Symptoms)  Outcome: Progressing   Goal Outcome Evaluation:    Patient was visible in milieu in a clam mood, appears flat and withdrawn. He reports some anxiety and depression, denies SI/SIB. Patient reports auditory hallucination stating he hears two voices, one from his left side and one from right side. He states the voices tell him to move constantly and use offensive language. Patient denies other concerns. He attended group activities with , is eating and drinking adequately, denies any pain or discomfort.

## 2023-01-11 ENCOUNTER — APPOINTMENT (OUTPATIENT)
Dept: INTERPRETER SERVICES | Facility: CLINIC | Age: 53
End: 2023-01-11
Payer: MEDICAID

## 2023-01-11 LAB
ATRIAL RATE - MUSE: 73 BPM
DIASTOLIC BLOOD PRESSURE - MUSE: NORMAL MMHG
INTERPRETATION ECG - MUSE: NORMAL
P AXIS - MUSE: 64 DEGREES
PR INTERVAL - MUSE: 148 MS
QRS DURATION - MUSE: 84 MS
QT - MUSE: 384 MS
QTC - MUSE: 423 MS
R AXIS - MUSE: 1 DEGREES
SYSTOLIC BLOOD PRESSURE - MUSE: NORMAL MMHG
T AXIS - MUSE: 19 DEGREES
VENTRICULAR RATE- MUSE: 73 BPM

## 2023-01-11 PROCEDURE — 250N000013 HC RX MED GY IP 250 OP 250 PS 637: Performed by: PSYCHIATRY & NEUROLOGY

## 2023-01-11 PROCEDURE — 124N000003 HC R&B MH SENIOR/ADOLESCENT

## 2023-01-11 PROCEDURE — 99231 SBSQ HOSP IP/OBS SF/LOW 25: CPT | Performed by: PSYCHIATRY & NEUROLOGY

## 2023-01-11 PROCEDURE — G0177 OPPS/PHP; TRAIN & EDUC SERV: HCPCS

## 2023-01-11 RX ADMIN — OLANZAPINE 15 MG: 15 TABLET, FILM COATED ORAL at 20:14

## 2023-01-11 ASSESSMENT — ACTIVITIES OF DAILY LIVING (ADL)
ADLS_ACUITY_SCORE: 32
ADLS_ACUITY_SCORE: 32
ORAL_HYGIENE: INDEPENDENT
ADLS_ACUITY_SCORE: 32
ORAL_HYGIENE: INDEPENDENT
HYGIENE/GROOMING: INDEPENDENT
ADLS_ACUITY_SCORE: 32
LAUNDRY: UNABLE TO COMPLETE
HYGIENE/GROOMING: INDEPENDENT
ADLS_ACUITY_SCORE: 32
ADLS_ACUITY_SCORE: 32
DRESS: INDEPENDENT
ADLS_ACUITY_SCORE: 32
DRESS: INDEPENDENT
ADLS_ACUITY_SCORE: 32

## 2023-01-11 NOTE — PROGRESS NOTES
"PSYCHIATRY  PROGRESS NOTE     DATE OF SERVICE   01/11/2023  Patient progress notes have been reviewed.     CHIEF COMPLAINT   \" I am doing okay\".     SUBJECTIVE   Nursing reports:  Patient is calm, flat and withdrawn. He is attending group activities with the assistance of . He reports decreased in auditory hallucinations, states anxiety and depression are both low, denies any thoughts of SI/SIB. Patient is eating most of his meals, denies any pian or discomfort.     Patient has been discussed with the  earlier today.    Assessment/Intervention/Current Symtoms and Care Coordination  CTC (writer)  reviewed pt's chart.   -Rounded with team in review of pt's plan and care  -Met with pt yesterday's evening with the help of an  and asked if he could contact his family in [place] via facebook. Pt reported that he has no way of doing that. Does not have a phone or email that he can access.  -Emergency MA is ongoing  -Was referred for MNChoice assessment and currently waiting  -Stabilization is ongoing     OBJECTIVE   Patient was seen and evaluated at bedside by himself, this was a face-to-face evaluation.  Interview was conducted in Nepali by this writer.  Today patient informed me that the voices have been less intense and he is starting to feel much better.  He continues to deny side effects from the medications and stated that he is in agreement with continue taking the Zyprexa.  Patient seemed more animated and his speech was more spontaneous.  Patient stated that he was worried about being discharged from the hospital before the medical assistance is approved.  I discussed with the patient that it is not going to happen and we need to have that medical assistant before we can start making any plans for discharge.  Patient verbalized good understanding and he was in agreement with this.  At this time he is denying any other issues or concerns.  Continues to denied having any " "thoughts of harming himself and has been able to contract for safety.       MEDICATIONS   Medications:  Scheduled Meds:    OLANZapine  15 mg Oral At Bedtime     Continuous Infusions:  PRN Meds:.acetaminophen, alum & mag hydroxide-simethicone, hydrOXYzine, ibuprofen, OLANZapine **OR** OLANZapine, senna-docusate, traZODone    Medication adherence issues: MS Med Adherence Y/N: Yes, Hospitalization  Medication side effects: MEDICATION SIDE EFFECTS: no side effects reported  Benefit: Yes / No: Yes       ROS   A comprehensive review of systems was negative.       MENTAL STATUS EXAM   Vitals: /80   Pulse 74   Temp 96.9  F (36.1  C) (Temporal)   Resp 16   Ht 1.626 m (5' 4\")   Wt 73.8 kg (162 lb 11.2 oz)   SpO2 100%   BMI 27.93 kg/m      Appearance:  No apparent distress  Mood:  {Mood: Normal  Affect: full range  was congruent to speech  Suicidal Ideation: PRESENT / ABSENT: absent   Homicidal Ideation: PRESENT / ABSENT: absent   Thought process: [place]   Thought content: Continues to experience auditory hallucinations that as per patient are less intense.  Fund of Knowledge: Below average  Attention/Concentration: Fair  Language ability:  Intact  Memory:  Immediate recall impaired, Short-term memory impaired and Long-term memory intact  Insight:  fair.  Judgement: fair  Orientation: Yes, x4  Psychomotor Behavior: normal or unremarkable    Muscle Strength and Tone: MuscleStrength: Normal  Gait and Station: Normal       LABS   personally reviewed.   No results found for this or any previous visit (from the past 24 hour(s)).  Recent Labs   Lab 01/10/23  0855   WBC 6.9   HGB 14.5   MCV 90         POTASSIUM 4.4   CHLORIDE 106   CO2 34*   BUN 12   CR 0.69   ANIONGAP <1*   JENN 9.3   *   ALBUMIN 3.6   PROTTOTAL 7.4   BILITOTAL 0.3   ALKPHOS 95   ALT 19   AST 16     No results found for: PHENYTOIN, PHENOBARB, VALPROATE, CBMZ       DIAGNOSIS   Principal Problem:    Schizophrenia, schizoaffective, " chronic with acute exacerbation (H)    Active Problem List:  Patient Active Problem List   Diagnosis     Schizophrenia, schizoaffective, chronic with acute exacerbation (H)          PLAN   1. Ongoing education given regarding diagnostic and treatment options with risks, benefits and alternatives and adequate verbalization of understanding.  2.  Medications      Zyprexa to 15 mg at bedtime    3.  Medical team to follow the patient as needed.  4.   coordinating a safe discharge plan with the patient.      Risk Assessment: [facility] RISK ASSESSMENT: Patient able to contract for safety    Coordination of Care:   Treatment Plan reviewed and physician signed, Care discussed with Care/Treatment Team Members, Chart reviewed and Patient seen      Re-Certification I certify that the inpatient psychiatric facility services furnished since the previous certification were, and continue to be, medically necessary for, either, treatment which could reasonably be expected to improve the patient s condition or diagnostic study and that the hospital records indicate that the services furnished were, either, intensive treatment services, admission and related services necessary for diagnostic study, or equivalent services.     I certify that the patient continues to need, on a daily basis, active treatment furnished directly by or requiring the supervision of inpatient psychiatric facility personnel.   I estimate 14 days of hospitalization is necessary for proper treatment of the patient. My plans for post-hospital care for this patient are  TBD     Paris Mccauley MD    -     01/11/2023  -     2:19 PM    Total time  25 minutes with > 50%spent on coordination of cares and psycho-education.    This note was created with help of Dragon dictation system. Grammatical / typing errors are not intentional.    Paris Mccauley MD

## 2023-01-11 NOTE — PLAN OF CARE
Problem: Psychotic Signs/Symptoms  Goal: Improved Sleep (Psychotic Signs/Symptoms)  Outcome: Progressing   Goal Outcome Evaluation:         Slept uninterrupted for 8 hours  No concerns raised this shift

## 2023-01-11 NOTE — PLAN OF CARE
Assessment/Intervention/Current Symtoms and Care Coordination  CTC (writer)  reviewed pt's chart.   -Rounded with team in review of pt's plan and care  -Met with pt yesterday's evening with the help of an  and asked if he could contact his family in [place] via facebook. Pt reported that he has no way of doing that. Does not have a phone or email that he can access.  -Emergency MA is ongoing  -Was referred for MNChoice assessment and currently waiting  -Stabilization is ongoing      Discharge Plan or Goal  Pt is new and is undergoing stabilization. He is currently homeless      Barriers to Discharge   Safe discharge plan, ongoing symptom severity (highly disorganized, and having psychosis in the context of hearing voices), and medication evaluation/assessment.     Referral Status  None  Legal Status  Voluntary

## 2023-01-11 NOTE — PLAN OF CARE
Problem: Psychotic Signs/Symptoms  Goal: Improved Behavioral Control (Psychotic Signs/Symptoms)  Outcome: Progressing   Goal Outcome Evaluation:    Plan of Care Reviewed With: patient      Patient is calm, flat and withdrawn. He is attending group activities with the assistance of . He reports decreased in auditory hallucinations, states anxiety and depression are both low, denies any thoughts of SI/SIB. Patient is eating most of his meals, denies any pian or discomfort.

## 2023-01-11 NOTE — PLAN OF CARE
"Goal Outcome Evaluation:    Plan of Care Reviewed With: patient          Pt bright on approach. Appears calm. Pleasant, cooperative. Through , pt states his depression is minimal. Denies anxiety. Denies SI. Denies pain or side effects. Reports improvement in AH since admission, but voices continue to tell him \"not to be anywhere.\" Reports good sleep. Appetite & hygiene good. Request to shower was accommodated. Continue with current treatment plan and recommendations. Continue to monitor and reassess symptoms. Monitor response to medications. Monitor progress towards treatment goals. Encourage groups and participation.          "

## 2023-01-11 NOTE — PLAN OF CARE
"Occupational Therapy     01/11/23 1400   Group Therapy Session   Group Attendance attended group session   Time Session Began 1015   Time Session Ended 1200   Total Time (minutes) 60   Total # Attendees 6-7   Group Type task skill   Group Topic Covered cognitive activities;coping skills/lifestyle management;leisure exploration/use of leisure time;problem-solving;structured socialization   Group Session Detail OT: Education on healthy activity engagement and creative hands on endeavor (OT clinic) to increase concentration, focus, attention to task/detail, decision making, problem solving, frustration tolerance, task follow through, coping with stress, healthy leisure engagement, creative expression, and social engagement   Patient Response/Contribution cooperative with task;listened actively;organized  (pleasant; cooperative)   Patient Participation Detail Pt reported during check-in that one thing going well for him this week is that he's \"here and the staff are nice; they take good care of me\". Pt sat among peers to complete creative hands on endeavor and did not engage in social interactions with peers; lack of socialization may have been due to language difference (despite presence of ). Pt worked in a neat and tidy manner to complete project. Pt cleaned-up all supplies and his workspace.          "

## 2023-01-11 NOTE — PLAN OF CARE
"Problem: Psychotic Signs/Symptoms  Goal: Improved Behavioral Control (Psychotic Signs/Symptoms)  Outcome: Progressing   Goal Outcome Evaluation:    Patient is alert and oriented. VSS. Pt was visible in the milieu. Pt denied anxiety, SI, SIB, and HI. Pt endorsed minimal depression. Pt continues to endorse auditory hallucination. Patient described mood as \"normal\" and affect was flat. Patient is cooperative with cares. Patient is med-compliant, and ate 100% of his dinner. Reports \"sleeps through the night\". No other health concerns reported. Pt wants to hear the plan of treatment from the provider. Patient evaluation continues    /83 (BP Location: Right arm, Patient Position: Sitting, Cuff Size: Adult Regular)   Pulse 70   Temp 97.4  F (36.3  C) (Temporal)   Resp 18   Ht 1.626 m (5' 4\")   Wt 73.8 kg (162 lb 11.2 oz)   SpO2 100%   BMI 27.93 kg/m             "

## 2023-01-12 ENCOUNTER — APPOINTMENT (OUTPATIENT)
Dept: INTERPRETER SERVICES | Facility: CLINIC | Age: 53
End: 2023-01-12
Payer: MEDICAID

## 2023-01-12 ENCOUNTER — OFFICE VISIT (OUTPATIENT)
Dept: INTERPRETER SERVICES | Facility: CLINIC | Age: 53
End: 2023-01-12
Payer: MEDICAID

## 2023-01-12 PROCEDURE — 250N000013 HC RX MED GY IP 250 OP 250 PS 637: Performed by: PSYCHIATRY & NEUROLOGY

## 2023-01-12 PROCEDURE — 124N000003 HC R&B MH SENIOR/ADOLESCENT

## 2023-01-12 PROCEDURE — T1013 SIGN LANG/ORAL INTERPRETER: HCPCS | Mod: U3

## 2023-01-12 PROCEDURE — G0177 OPPS/PHP; TRAIN & EDUC SERV: HCPCS

## 2023-01-12 PROCEDURE — H2032 ACTIVITY THERAPY, PER 15 MIN: HCPCS

## 2023-01-12 RX ADMIN — TRAZODONE HYDROCHLORIDE 50 MG: 50 TABLET ORAL at 20:32

## 2023-01-12 RX ADMIN — OLANZAPINE 15 MG: 15 TABLET, FILM COATED ORAL at 20:20

## 2023-01-12 ASSESSMENT — ACTIVITIES OF DAILY LIVING (ADL)
ADLS_ACUITY_SCORE: 32
HYGIENE/GROOMING: INDEPENDENT
ADLS_ACUITY_SCORE: 32
DRESS: INDEPENDENT;SCRUBS (BEHAVIORAL HEALTH)
ADLS_ACUITY_SCORE: 32
HYGIENE/GROOMING: INDEPENDENT
ORAL_HYGIENE: INDEPENDENT
ADLS_ACUITY_SCORE: 32
ADLS_ACUITY_SCORE: 32
LAUNDRY: WITH SUPERVISION
ADLS_ACUITY_SCORE: 32
LAUNDRY: UNABLE TO COMPLETE
ADLS_ACUITY_SCORE: 32
DRESS: INDEPENDENT
ORAL_HYGIENE: INDEPENDENT
ADLS_ACUITY_SCORE: 32

## 2023-01-12 NOTE — PLAN OF CARE
Goal Outcome Evaluation:       Patient slept well the whole night for 7.75 hours with no interruptions. No complaints made. No behavioral issues observed.

## 2023-01-12 NOTE — PROGRESS NOTES
Pt participated in dance/movement therapy (D/MT) using movement metaphors to explore integration of mood, thought and identity.  Rhythm was implemented to organize and vitalize, while supporting community connection.  Pt was organized in his movement and smiled broadly throughout the session.       01/12/23 1130   Expressive Therapy   Therapy Type dance/movement   Minutes of Treatment 50

## 2023-01-12 NOTE — PLAN OF CARE
01/12/23 1540   Group Therapy Session   Group Attendance attended group session   Time Session Began 1345   Time Session Ended 1435   Total Time (minutes) 45   Total # Attendees 4   Group Type life skill;task skill   Group Topic Covered cognitive activities;coping skills/lifestyle management;structured socialization   Group Session Detail OT Topic Group-Week in Review handout   Patient Response/Contribution cooperative with task   Patient Participation Detail pt completed week in review handout with  assistance. pt worked quietly and independently. pt shared he was grateful to be in the hospital and feels the groups are helping him in his recovery.

## 2023-01-12 NOTE — PLAN OF CARE
Assessment/Intervention/Current Symtoms and Care Coordination  CTC (writer)  reviewed pt's chart.   -Rounded with team in review of pt's plan and care  -Emergency MA is ongoing  -Was referred for MNChoice assessment and currently waiting  -Stabilization is ongoing      Discharge Plan or Goal  Pt is new and is undergoing stabilization. He is currently homeless      Barriers to Discharge   Safe discharge plan, ongoing symptom severity (highly disorganized, and having psychosis in the context of hearing voices), and medication evaluation/assessment.     Referral Status  None  Legal Status  Voluntary

## 2023-01-12 NOTE — PLAN OF CARE
Problem: Adult Behavioral Health Plan of Care  Goal: Plan of Care Review  Outcome: Progressing   Goal Outcome Evaluation:  Patient is alert and oriented, Lao speaking, with the help of the  patient presented with a flat affect, mood is calm, pleasant and cooperative with cares. Endorsed anxiety and depression as low and decreased auditory hallucinations. Denies all other mental health symptoms, sleeps good through the night, eating and drinking adequately, attends groups, patient appears well groomed, no other concerns, will continue with the plan of care.

## 2023-01-12 NOTE — PLAN OF CARE
01/12/23 1527   Group Therapy Session   Group Attendance attended group session   Time Session Began 1300   Time Session Ended 1345   Total Time (minutes) 45   Total # Attendees 6   Group Type life skill;task skill   Group Topic Covered cognitive activities;coping skills/lifestyle management;problem-solving   Group Session Detail OT Cognitive Wellness group   Patient Response/Contribution cooperative with task;able to recall/repeat info presented   Patient Participation Detail pt arrived to group with . pt was able to follow verbal instructions via . pt worked quietly and independently for duration of activity. pt wrote all answers in Lithuanian and pt found approx. 15 items per picture.

## 2023-01-12 NOTE — PLAN OF CARE
01/12/23 1424   Group Therapy Session   Group Attendance attended group session   Time Session Began 1015   Time Session Ended 1115   Total Time (minutes) 60   Total # Attendees 7   Group Type expressive therapy;task skill;psychotherapeutic   Group Topic Covered coping skills/lifestyle management;balanced lifestyle;problem-solving;structured socialization   Group Session Detail Occupational Therapy Clinic group to facilitate coping skill exploration, use of cognitive skills and problem solving, creative expression, clinical observation and facilitation of social, cognitive, and kinesthetic performance skills.    Patient Response/Contribution cooperative with task;organized   Patient Participation Detail Worked independently on task. Needed directions repeated a couple times with unfamiliar steps and activity, through the . 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 Pleasant on approach through the .

## 2023-01-13 ENCOUNTER — APPOINTMENT (OUTPATIENT)
Dept: INTERPRETER SERVICES | Facility: CLINIC | Age: 53
End: 2023-01-13
Attending: PSYCHIATRY & NEUROLOGY
Payer: MEDICAID

## 2023-01-13 ENCOUNTER — APPOINTMENT (OUTPATIENT)
Dept: INTERPRETER SERVICES | Facility: CLINIC | Age: 53
End: 2023-01-13
Payer: MEDICAID

## 2023-01-13 PROCEDURE — 250N000013 HC RX MED GY IP 250 OP 250 PS 637: Performed by: PSYCHIATRY & NEUROLOGY

## 2023-01-13 PROCEDURE — 124N000003 HC R&B MH SENIOR/ADOLESCENT

## 2023-01-13 PROCEDURE — G0177 OPPS/PHP; TRAIN & EDUC SERV: HCPCS

## 2023-01-13 PROCEDURE — 99231 SBSQ HOSP IP/OBS SF/LOW 25: CPT | Performed by: PSYCHIATRY & NEUROLOGY

## 2023-01-13 RX ADMIN — OLANZAPINE 15 MG: 15 TABLET, FILM COATED ORAL at 21:06

## 2023-01-13 RX ADMIN — TRAZODONE HYDROCHLORIDE 50 MG: 50 TABLET ORAL at 21:06

## 2023-01-13 ASSESSMENT — ACTIVITIES OF DAILY LIVING (ADL)
ORAL_HYGIENE: INDEPENDENT
ORAL_HYGIENE: INDEPENDENT
ADLS_ACUITY_SCORE: 32
HYGIENE/GROOMING: INDEPENDENT
DRESS: INDEPENDENT
ADLS_ACUITY_SCORE: 32
HYGIENE/GROOMING: INDEPENDENT
LAUNDRY: UNABLE TO COMPLETE
ADLS_ACUITY_SCORE: 32
ADLS_ACUITY_SCORE: 32
LAUNDRY: UNABLE TO COMPLETE
DRESS: INDEPENDENT;SCRUBS (BEHAVIORAL HEALTH)
ADLS_ACUITY_SCORE: 32

## 2023-01-13 NOTE — PLAN OF CARE
Assessment/Intervention/Current Symtoms and Care Coordination  CTC (writer)  reviewed pt's chart.   -Rounded with team in review of pt's plan and care  -Emergency MA is ongoing  -Was referred for [facility] assessment and currently waiting: Waiting on [place] [employer] funding  -Stabilization is ongoing      Discharge Plan or Goal  Pt is new and is undergoing stabilization. He is currently homeless      Barriers to Discharge   Safe discharge plan, ongoing symptom severity (highly disorganized, and having psychosis in the context of hearing voices), and medication evaluation/assessment.     Referral Status  None  Legal Status  Voluntary

## 2023-01-13 NOTE — PLAN OF CARE
01/13/23 1514   Group Therapy Session   Group Attendance attended group session   Time Session Began 1330   Time Session Ended 1420   Total Time (minutes) 50   Total # Attendees 4   Group Type psychotherapeutic;task skill   Group Topic Covered coping skills/lifestyle management;problem-solving;cognitive therapy techniques;cognitive activities;structured socialization;balanced lifestyle   Group Session Detail Activity using visuospatial concepts in problem solving.   Patient Response/Contribution cooperative with task;organized;listened actively   Patient Participation Detail [place] activity with 1 explanation from . Problem solved effectively and successfully. Needed no cues or reminders at his turns. Appears alert and attentive and interested in being involved. Stated appreciates using his brain with the activities.

## 2023-01-13 NOTE — PROGRESS NOTES
Patient was seen and evaluated in the consult room by himself, this was done with the use of telehealth.  Patient reported that he has continued to improved with the use of Zyprexa and at this time denies any side effects from the medications.  Patient stated that the auditory hallucinations he has been experiencing are minimal and these are not bothering him.  Continues to be in agreement with staying in the hospital and is aware that his medical assistance application remains pending.    Paris Mccauley MD

## 2023-01-13 NOTE — PLAN OF CARE
Goal Outcome Evaluation:       Patient was observed sleeping on every 15 minutes safety rounds check. Respirations observed and breathing normal unlabored. Total hours slept is 9.25.

## 2023-01-13 NOTE — PLAN OF CARE
01/13/23 1322   Group Therapy Session   Group Attendance attended group session   Time Session Began 1110   Time Session Ended 1200   Total Time (minutes) 50   Total # Attendees 5   Group Type psychoeducation;psychotherapeutic   Group Topic Covered coping skills/lifestyle management;cognitive therapy techniques;cognitive activities;structured socialization;balanced lifestyle;self-care activities   Group Session Detail Activity creating ideas for exploring and utilizing coping strategies.   Patient Response/Contribution cooperative with task;listened actively;expressed understanding of topic;discussed personal experience with topic   Patient Participation Detail Offered ideas in context, clear and creative, through the . Appeared to understand the content will of thinking of ideas for coping.

## 2023-01-13 NOTE — PLAN OF CARE
"  Problem: Adult Behavioral Health Plan of Care  Goal: Absence of New-Onset Illness or Injury  Outcome: Progressing  Intervention: Identify and Manage Fall Risk  Recent Flowsheet Documentation  Taken 1/12/2023 2100 by Marylou Us RN  Safety Measures:   clinical history reviewed   environmental rounds completed   safety plan reviewed   safety rounds completed   self-directed behavior promoted   suicide check-in completed   Goal Outcome Evaluation:    Plan of Care Reviewed With: patient        Pt was visible on the unit, sat alone and worked on word searches. Engaged with  and writer during assessment. Reported low grade anxiety and depression, rating them at 2/10. Endorsed AH, \" It is the same voice. The voices are telling me to keep going and not to stay in a stable environment\". His goal was to remain calm and get better. Denied all other psych symptoms. Ate 100% dinner and snack. Was pleasant, cooperative, and med compliant. BP at 1600 was 113/72. Has a BM today. PRN Trazodone 50 mg administered for sleep.  No verbal outbursts and no behavioral issues noted.  Will continue to monitor.      /72   Pulse 88   Temp 97.8  F (36.6  C) (Temporal)   Resp 16   Ht 1.626 m (5' 4\")   Wt 74.2 kg (163 lb 9.3 oz)   SpO2 97%   BMI 28.08 kg/m        "

## 2023-01-13 NOTE — PLAN OF CARE
01/13/23 1319   Group Therapy Session   Group Attendance attended group session   Time Session Began 1015   Time Session Ended 1110   Total Time (minutes) 55   Total # Attendees 5   Group Type expressive therapy;psychotherapeutic;task skill   Group Topic Covered coping skills/lifestyle management;cognitive activities;problem-solving;structured socialization   Group Session Detail Occupational Therapy Clinic group to facilitate coping skill exploration, use of cognitive skills and problem solving, creative expression, clinical observation and facilitation of social, cognitive, and kinesthetic performance skills.    Patient Response/Contribution cooperative with task;listened actively;organized   Patient Participation Detail Worked on a task problem solving using visuospatial problem solving. Was successful and quick in identifying solutions. Pleasant on approach and in conversation through the  present.

## 2023-01-13 NOTE — PLAN OF CARE
Problem: Adult Behavioral Health Plan of Care  Goal: Plan of Care Review  Outcome: Progressing  Flowsheets (Taken 1/13/2023 0952)  Patient Agreement with Plan of Care: agrees   Goal Outcome Evaluation:    Plan of Care Reviewed With: patient      Patient is alert and oriented x3, able to verbalize needs, presents with a flat blunted affect, mood is calm, pleasant and cooperative with cares. Patient was visible at the milieu, continues to be isolative, with the help of the , patient endorsed anxiety and depression as low, continues to hear voices telling him to keep going, denies all other mental fred[place] symptoms, no complains of pain during the shift, eating and drinking adequately, takes a shower in the evenings per the patient and looks well groomed, no other concerns, will continue with the care plan.

## 2023-01-14 PROCEDURE — 250N000013 HC RX MED GY IP 250 OP 250 PS 637: Performed by: PSYCHIATRY & NEUROLOGY

## 2023-01-14 PROCEDURE — 124N000003 HC R&B MH SENIOR/ADOLESCENT

## 2023-01-14 PROCEDURE — G0177 OPPS/PHP; TRAIN & EDUC SERV: HCPCS

## 2023-01-14 RX ADMIN — OLANZAPINE 15 MG: 15 TABLET, FILM COATED ORAL at 20:14

## 2023-01-14 ASSESSMENT — ACTIVITIES OF DAILY LIVING (ADL)
ADLS_ACUITY_SCORE: 32
DRESS: INDEPENDENT
ORAL_HYGIENE: INDEPENDENT
ADLS_ACUITY_SCORE: 32
HYGIENE/GROOMING: INDEPENDENT
ADLS_ACUITY_SCORE: 32
ADLS_ACUITY_SCORE: 32
LAUNDRY: UNABLE TO COMPLETE
ADLS_ACUITY_SCORE: 32

## 2023-01-14 NOTE — PLAN OF CARE
Goal Outcome Evaluation:       Patient slept comfortably for 8.5 hours the whole night. Nothing unusual noted. No behavioral issues observed and no concerns raised.

## 2023-01-14 NOTE — PLAN OF CARE
"  Problem: Adult Behavioral Health Plan of Care  Goal: Adheres to Safety Considerations for Self and Others  Outcome: Progressing  Flowsheets (Taken 1/14/2023 1344)  Adheres to Safety Considerations for Self and Others: making progress toward outcome     Problem: Psychotic Signs/Symptoms  Goal: Improved Behavioral Control (Psychotic Signs/Symptoms)  Outcome: Progressing  Flowsheets (Taken 1/14/2023 1347)  Mutually Determined Action Steps (Improved Behavioral Control): identifies future-oriented goal   Goal Outcome Evaluation:    Plan of Care Reviewed With: patient          Patient alert and oriented x4  Mood: calm, cooperative, depressed/sad.   Affect: blunted/flat.   Verbalized emotional state: Depression \"very little\" Anxiety \" very little\".   Speech: clear, coherent, logical and linear.   Thought process: Good judgement and insight.   Activity: Visible in the milieu during meal time. Went to a OT group. Watched TV in the lounge.   Denied SI, SIB. Contracts for safety on the unit.   Continued to report auditory hallucination - voice, but less intense this shift.         "

## 2023-01-14 NOTE — PLAN OF CARE
Problem: Adult Behavioral Health Plan of Care  Goal: Plan of Care Review  Outcome: Progressing  Flowsheets  Taken 1/13/2023 2137  Plan of Care Reviewed With: patient  Overall Patient Progress: improving  Patient Agreement with Plan of Care: agrees    Patient was visible in the milieu today. Flat affect. Calm, pleasant and cooperative. MH assessment was done with an  present. Pt said his mood was Ok. Denied SI, SIB, VH but endorsed AH, hearing voices telling him to leave this place. Pt said he has mild anxiety and depression. Stated he attended groups today. Pt was also observed playing cards with other patients. Pt stated his medications were working for him. Denied pain and side effects. Patient ate 100% of his dinner with adequate fluids. No other concerns noted this shift.

## 2023-01-14 NOTE — PLAN OF CARE
"  Problem: Adult Behavioral Health Plan of Care  Goal: Adheres to Safety Considerations for Self and Others  Outcome: Progressing  Flowsheets (Taken 1/14/2023 1767)  Adheres to Safety Considerations for Self and Others: making progress toward outcome   Goal Outcome Evaluation:    Plan of Care Reviewed With: patient          Patient alert and oriented x4   Mood: depressed/sad.   Affect: blunted/flat.   Verbalized emotional state as \"OK\"  Speech: clear, coherent, logical   Thought process: fair judgement and insight.   Activity: Visible in the milieu, engages in word games and watching TV.  Denied SI, SIB  Continued to report AH - voices, seem to be a baseline.   Appetite and sleep are good. Patient keeps himself clan and well groomed.            "

## 2023-01-14 NOTE — PROGRESS NOTES
01/14/23 1437   Group Therapy Session   Group Attendance attended group session   Time Session Began 1115   Time Session Ended 1200   Total Time (minutes) 45   Total # Attendees 3   Group Type recreation   Group Session Detail Education provided on mental health benefits of healthy leisure and group discussion plus hands on Zack game for concentration, attention to detail, strategy making, simple problem solving, organization/planning, tracking, healthy leisure, coping, mood stabilization, reality based activity, building self-esteem and expanding social skills.   Patient Participation Detail  was unavailable for group, so therapist changed the planned processing group to an easier to navigate group with a language barrier.  Pt was familiar with the activity and between therapist's remnants of Occitan and pt's limited English skills pt was able to track the activity without issue.  Pt was pleasant, polite and remained for the the full group session.

## 2023-01-15 PROCEDURE — 124N000003 HC R&B MH SENIOR/ADOLESCENT

## 2023-01-15 PROCEDURE — 250N000013 HC RX MED GY IP 250 OP 250 PS 637: Performed by: PSYCHIATRY & NEUROLOGY

## 2023-01-15 RX ADMIN — OLANZAPINE 15 MG: 15 TABLET, FILM COATED ORAL at 19:59

## 2023-01-15 ASSESSMENT — ACTIVITIES OF DAILY LIVING (ADL)
LAUNDRY: UNABLE TO COMPLETE
ADLS_ACUITY_SCORE: 32
ADLS_ACUITY_SCORE: 32
DRESS: SCRUBS (BEHAVIORAL HEALTH);INDEPENDENT
ADLS_ACUITY_SCORE: 32
ORAL_HYGIENE: INDEPENDENT
ADLS_ACUITY_SCORE: 32
HYGIENE/GROOMING: INDEPENDENT
ADLS_ACUITY_SCORE: 32

## 2023-01-15 NOTE — PLAN OF CARE
Goal Outcome Evaluation:       Patient slept well the whole night for 9 hours. Nothing unusual noted. No concerns raised. No behavioral issues observed.

## 2023-01-15 NOTE — PLAN OF CARE
He is ambulatory around the unit and denied pain/discomfort, suicidal ideation, homicidal ideation, and hallucinations when asked with Korean interpretor.  He verbalized understanding to come to staff for questions and/or concerns.

## 2023-01-16 PROCEDURE — 250N000013 HC RX MED GY IP 250 OP 250 PS 637: Performed by: PSYCHIATRY & NEUROLOGY

## 2023-01-16 PROCEDURE — 99231 SBSQ HOSP IP/OBS SF/LOW 25: CPT | Performed by: PSYCHIATRY & NEUROLOGY

## 2023-01-16 PROCEDURE — G0177 OPPS/PHP; TRAIN & EDUC SERV: HCPCS

## 2023-01-16 PROCEDURE — 124N000003 HC R&B MH SENIOR/ADOLESCENT

## 2023-01-16 RX ADMIN — OLANZAPINE 15 MG: 15 TABLET, FILM COATED ORAL at 20:13

## 2023-01-16 ASSESSMENT — ACTIVITIES OF DAILY LIVING (ADL)
ADLS_ACUITY_SCORE: 32
ADLS_ACUITY_SCORE: 32
LAUNDRY: UNABLE TO COMPLETE
ADLS_ACUITY_SCORE: 32
HYGIENE/GROOMING: INDEPENDENT
ADLS_ACUITY_SCORE: 32
ADLS_ACUITY_SCORE: 32
DRESS: SCRUBS (BEHAVIORAL HEALTH)
ADLS_ACUITY_SCORE: 32
ORAL_HYGIENE: INDEPENDENT

## 2023-01-16 NOTE — PLAN OF CARE
Goal Outcome Evaluation:       Pt appeared to sleep comfortably throughout the night.  He slept 10 hours.

## 2023-01-16 NOTE — PLAN OF CARE
Occupational Therapy Group Note:     01/16/23 0554   Group Therapy Session   Group Attendance attended group session   Time Session Began 1015   Time Session Ended 1115   Total Time (minutes) 60   Total # Attendees 4   Group Type expressive therapy   Group Topic Covered coping skills/lifestyle management   Group Session Detail OT clinic   Patient Response/Contribution cooperative with task;organized   Patient Participation Detail Pt actively participated in occupational therapy clinic (accompanied by an ) to facilitate coping skill exploration, creative expression within personally meaningful activities, and clinical observation of social, cognitive, and kinesthetic performance skills. Pt response: Independent to initiate, gather materials, sequence, and adjust to workspace demands as needed. Demonstrated good focus, planning, and attention to detail for selected structured creative expression task. Able to ask for assistance as needed, and appeared comfortable interacting with peers and staff, though spent a majority of group quietly focused on his task, appearing calm throughout.

## 2023-01-16 NOTE — PLAN OF CARE
"Goal Outcome Evaluation:    Plan of Care Reviewed With: patient      Patient has been present in the milieu. He denies SI and SIB. He does endorse AH stating the voices are telling him \"I cant be anywhere, its all the same\". He denies racing thoughts stating \"no,everything is fine\". He feels his medications are helping. He denies pain. He does not feel hopeful. He endorses feeling safe. He denies depression and anxiety. He states he slept well last night. His appetite is good. He did not attend groups. He took a shower yesterday. He does not have anything to pass on to the doctor. He is medication compliant. He has no concerns.                  "

## 2023-01-16 NOTE — PLAN OF CARE
Problem: Psychotic Signs/Symptoms  Goal: Increased Participation and Engagement (Psychotic Signs/Symptoms)  Outcome: Progressing   Goal Outcome Evaluation:    Plan of Care Reviewed With: patient             Patient calm, cooperative and pleasant upon approach.   Visible in the milieu. Attended group activities. Appetite and sleep are good. Mental health symptoms are at baseline.

## 2023-01-16 NOTE — PROGRESS NOTES
"PSYCHIATRY  PROGRESS NOTE     DATE OF SERVICE   01/16/2023  Patient progress notes have been reviewed.     CHIEF COMPLAINT   \" I am doing okay\".     SUBJECTIVE   Nursing reports:  Plan of Care Reviewed With: patient       Patient has been present in the milieu. He denies SI and SIB. He does endorse AH stating the voices are telling him \"I cant be anywhere, its all the same\". He denies racing thoughts stating \"no,everything is fine\". He feels his medications are helping. He denies pain. He does not feel hopeful. He endorses feeling safe. He denies depression and anxiety. He states he slept well last night. His appetite is good. He did not attend groups. He took a shower yesterday. He does not have anything to pass on to the doctor. He is medication compliant. He has no concerns.     Patient has been discussed with the  earlier today.  There has been no updates on the patient's medical assistance application status.     OBJECTIVE   Patient was seen and evaluated in the day area by himself, this was a face-to-face evaluation.  Interview was conducted in Ukrainian by this writer.  Patient reported that he is doing well and continues to denied all psychiatric symptoms.  He is compliant with his medications and is denying having any side effects from the Zyprexa.  Today patient denied having any auditory hallucinations.  He has been interacting with his peers appropriately, attending groups and he follows instructions.  He is aware that we are still waiting for the medical assistance to be approved and continues to be in agreement with staying in the hospital voluntarily.    Recent Labs   Lab 01/10/23  0855   WBC 6.9   HGB 14.5   MCV 90         POTASSIUM 4.4   CHLORIDE 106   CO2 34*   BUN 12   CR 0.69   ANIONGAP <1*   JENN 9.3   *   ALBUMIN 3.6   PROTTOTAL 7.4   BILITOTAL 0.3   ALKPHOS 95   ALT 19   AST 16          MEDICATIONS   Medications:  Scheduled Meds:    OLANZapine  15 mg Oral At " "Bedtime     Continuous Infusions:  PRN Meds:.acetaminophen, alum & mag hydroxide-simethicone, hydrOXYzine, ibuprofen, OLANZapine **OR** OLANZapine, senna-docusate, traZODone    Medication adherence issues: MS Med Adherence Y/N: Yes, Hospitalization  Medication side effects: MEDICATION SIDE EFFECTS: no side effects reported  Benefit: Yes / No: Yes       ROS   A comprehensive review of systems was negative.       MENTAL STATUS EXAM   Vitals: /84   Pulse 73   Temp 98.3  F (36.8  C)   Resp 17   Ht 1.626 m (5' 4\")   Wt 74.5 kg (164 lb 3.9 oz)   SpO2 100%   BMI 28.19 kg/m      Same as last visit  Appearance:  No apparent distress  Mood:  {Mood: Normal  Affect: full range  was congruent to speech  Suicidal Ideation: PRESENT / ABSENT: absent   Homicidal Ideation: PRESENT / ABSENT: absent   Thought process: [place]   Thought content: Denies having any auditory hallucinations during my assessment.  Fund of Knowledge: Below average  Attention/Concentration: Fair  Language ability:  Intact  Memory:  Immediate recall impaired, Short-term memory impaired and Long-term memory intact  Insight:  fair.  Judgement: fair  Orientation: Yes, x4  Psychomotor Behavior: normal or unremarkable    Muscle Strength and Tone: MuscleStrength: Normal  Gait and Station: Normal       LABS   personally reviewed.   No results found for this or any previous visit (from the past 24 hour(s)).  Recent Labs   Lab 01/10/23  0855   WBC 6.9   HGB 14.5   MCV 90         POTASSIUM 4.4   CHLORIDE 106   CO2 34*   BUN 12   CR 0.69   ANIONGAP <1*   JENN 9.3   *   ALBUMIN 3.6   PROTTOTAL 7.4   BILITOTAL 0.3   ALKPHOS 95   ALT 19   AST 16     No results found for: PHENYTOIN, PHENOBARB, VALPROATE, CBMZ       DIAGNOSIS   Principal Problem:    Schizophrenia, schizoaffective, chronic with acute exacerbation (H)    Active Problem List:  Patient Active Problem List   Diagnosis     Schizophrenia, schizoaffective, chronic with acute " exacerbation (H)          PLAN   1. Ongoing education given regarding diagnostic and treatment options with risks, benefits and alternatives and adequate verbalization of understanding.  2.  Medications      Zyprexa to 15 mg at bedtime    3.  Medical team to follow the patient as needed.  4.   coordinating a safe discharge plan with the patient.      Risk Assessment: [facility] RISK ASSESSMENT: Patient able to contract for safety    Coordination of Care:   Treatment Plan reviewed and physician signed, Care discussed with Care/Treatment Team Members, Chart reviewed and Patient seen      Re-Certification I certify that the inpatient psychiatric facility services furnished since the previous certification were, and continue to be, medically necessary for, either, treatment which could reasonably be expected to improve the patient s condition or diagnostic study and that the hospital records indicate that the services furnished were, either, intensive treatment services, admission and related services necessary for diagnostic study, or equivalent services.     I certify that the patient continues to need, on a daily basis, active treatment furnished directly by or requiring the supervision of inpatient psychiatric facility personnel.   I estimate 14 days of hospitalization is necessary for proper treatment of the patient. My plans for post-hospital care for this patient are  TBD     Paris Mccauley MD    -     01/16/2023  -     2:13 PM    Total time  25 minutes with > 50%spent on coordination of cares and psycho-education.    This note was created with help of Dragon dictation system. Grammatical / typing errors are not intentional.    Paris Mccauley MD

## 2023-01-16 NOTE — PLAN OF CARE
Occupational Therapy Group Note:     01/16/23 1200   Group Therapy Session   Group Attendance attended group session   Time Session Began 1115   Time Session Ended 1200   Total Time (minutes) 45   Total # Attendees 4-5   Group Type life skill   Group Topic Covered balanced lifestyle;coping skills/lifestyle management;structured socialization   Group Session Detail self-esteem discussion questions   Patient Response/Contribution cooperative with task;discussed personal experience with topic;expressed understanding of topic;listened actively   Patient Participation Detail Pt actively participated in a structured occupational therapy group (accompanied by an ) with a focus on facilitating self-esteem via self-reflection questions. Pt shared thoughtful responses regarding past achievements, positive social supports, and hobbies/skills. When prompted to identify someone similar to him, he identified his father. Appeared future-oriented and motivated for recovery, mentioning his goal to overcome addiction several times. Pleasant and engaged.

## 2023-01-17 ENCOUNTER — APPOINTMENT (OUTPATIENT)
Dept: INTERPRETER SERVICES | Facility: CLINIC | Age: 53
End: 2023-01-17
Payer: MEDICAID

## 2023-01-17 PROCEDURE — H2032 ACTIVITY THERAPY, PER 15 MIN: HCPCS

## 2023-01-17 PROCEDURE — 124N000003 HC R&B MH SENIOR/ADOLESCENT

## 2023-01-17 PROCEDURE — G0177 OPPS/PHP; TRAIN & EDUC SERV: HCPCS

## 2023-01-17 PROCEDURE — 250N000013 HC RX MED GY IP 250 OP 250 PS 637: Performed by: PSYCHIATRY & NEUROLOGY

## 2023-01-17 RX ADMIN — OLANZAPINE 15 MG: 15 TABLET, FILM COATED ORAL at 20:51

## 2023-01-17 ASSESSMENT — ACTIVITIES OF DAILY LIVING (ADL)
ADLS_ACUITY_SCORE: 32
HYGIENE/GROOMING: INDEPENDENT
ADLS_ACUITY_SCORE: 32
ORAL_HYGIENE: INDEPENDENT
DRESS: INDEPENDENT

## 2023-01-17 NOTE — PROGRESS NOTES
Pt appeared to be sleeping comfortably during the night. Total sleep hours 9.25 . No concerns reported or noted this shift.

## 2023-01-17 NOTE — PLAN OF CARE
01/17/23 1156   Group Therapy Session   Group Attendance attended group session   Time Session Began 1015   Time Session Ended 1115   Total Time (minutes) 60   Total # Attendees 4   Group Type expressive therapy;psychotherapeutic;task skill   Group Topic Covered coping skills/lifestyle management;problem-solving;cognitive therapy techniques;structured socialization;cognitive activities;balanced lifestyle   Group Session Detail Occupational Therapy Clinic group to facilitate coping skill exploration, use of cognitive skills and problem solving, creative expression, clinical observation and facilitation of social, cognitive, and kinesthetic performance skills.    Patient Response/Contribution cooperative with task;listened actively;organized   Patient Participation Detail Worked at a steady pace on an activity using visuospatial problem solving. Was successful in identifying solutions independently. Pleasant on approach. Comments and answers were brief through the  though in context.

## 2023-01-17 NOTE — PROGRESS NOTES
Pt requested to move through a full range of motion throughout the body in dance/movement therapy (D/MT) using different movement qualities to identify and express various emotions.  Independent, patient-directed dance was creative while remaining organized.  Pt used slashing and releasing motions with the arms, alternating and shuffling foot movements and engaged his core chest, and shoulders for full expression.  He smiled and stated he loved moving his whole body.       01/17/23 1130   Expressive Therapy   Therapy Type dance/movement   Minutes of Treatment 50

## 2023-01-17 NOTE — PLAN OF CARE
"Problem: Depression  Goal: Improved Mood  Outcome: Progressing   Goal Outcome Evaluation:  Plan of Care Reviewed With: patient      Patient was visible in the milieu watching TV. Pt is VSS. Alert and oriented. Pt denied all mental health symptoms. Except pt reports to endorse auditory hallucination. Patient described mood as \"normal\" and affect was flat. Patient is cooperative with cares. Patient is med-compliant, and ate 75% of his dinner. Reports \"sleeps through the night\". Patient evaluation continues.       VS reviewed: /72 (BP Location: Right arm, Patient Position: Sitting)   Pulse 95   Temp 97.8  F (36.6  C) (Temporal)   Resp 16   Ht 1.626 m (5' 4\")   Wt 74.5 kg (164 lb 3.9 oz)   SpO2 98%   BMI 28.19 kg/m   .         Length of stay: 17                                                                            "

## 2023-01-17 NOTE — PROGRESS NOTES
Behavioral Health  Note    Behavioral Health  Spirituality Group Note    UNIT 3[address]    Name:    Juan Ordoñez                                                                     YOB: 1970    MRN:     [id number]                                                                       Age: 52 year old      Patient attended -led group, which included discussion of spirituality, coping with illness and building resilience.    Patient attended group for [facility] - spirituality groups are not billed.    The patient actively participated in group discussion      Thomas Minor, PhD  Associate

## 2023-01-17 NOTE — PLAN OF CARE
Occupational Therapy Group Note     01/16/23 1900   Group Therapy Session   Group Attendance attended group session   Time Session Began 1855   Time Session Ended 1940   Total Time (minutes) 45   Total # Attendees 4   Group Type psychotherapeutic   Group Topic Covered cognitive activities;coping skills/lifestyle management;leisure exploration/use of leisure time;problem-solving;structured socialization   Group Session Detail OT Group: Cognitive and social skill building activity (scrutineyes) for improving attention, problem-solving, visual scanning, abstract thinking, turn-taking, deliniation of tasks, and teamwork, as well as opportunity to explore a healthy/sober leisure activity, and cope with symptoms through distraction.   Patient Response/Contribution cooperative with task   Patient Participation Detail Patient engaged in group with the assistance of his . He demonstrated flexibility by modifying the activity to accomodate using Arabic rather than English. He demonstrated good sustained attention and had a pleasant affect throughout group.

## 2023-01-17 NOTE — PLAN OF CARE
Assessment/Intervention/Current Symtoms and Care Coordination  CTC (writer)  reviewed pt's chart.   -Emergency MA is ongoing  -Was referred for [place] and currently waiting: [place]  -Stabilization is ongoing      Discharge Plan or Goal  Pt is new and is undergoing stabilization. He is currently homeless      Barriers to Discharge   Safe discharge plan, ongoing symptom severity (highly disorganized, and having psychosis in the context of hearing voices), and medication evaluation/assessment.     Referral Status  None  Legal Status  Voluntary

## 2023-01-17 NOTE — PLAN OF CARE
01/17/23 1414   Group Therapy Session   Group Attendance attended group session   Time Session Began 1305   Time Session Ended 1400   Total Time (minutes) 55   Total # Attendees 4   Group Type psychotherapeutic;psychoeducation;expressive therapy   Group Topic Covered coping skills/lifestyle management;problem-solving;cognitive therapy techniques;balanced lifestyle;structured socialization   Group Session Detail  Activity using creative and abstract thinking and actions with choices and decisions pt made   Patient Response/Contribution cooperative with task;listened actively   Patient Participation Detail Was quick to be involved. Was active in participating in a creative expression activity through an . He needed no cues at his turns, made guesses in context of the activity, seeming attentive and interested. Pleasant with others.

## 2023-01-17 NOTE — PLAN OF CARE
Goal Outcome Evaluation:    Plan of Care Reviewed With: patient          Pt calm, pleasant, cooperative. Eye contact and affect WDL. Attending and participating in therapeutic groups when  is present. Via , pt stated he is hopeful placement will be found soon. Denied depression, anxiety, suicidal ideation. Reported auditory hallucinations are still present but less intense and do not command him to harm himself or any other person. Med-compliant. Appetite, sleep, hygiene good. Continue with current treatment plan and recommendations. Continue to monitor and reassess symptoms. Monitor response to medications. Monitor progress towards treatment goals. Encourage groups and participation.

## 2023-01-17 NOTE — PLAN OF CARE
Problem: Psychotic Signs/Symptoms  Goal: Improved Behavioral Control (Psychotic Signs/Symptoms)  Outcome: Progressing  Flowsheets (Taken 1/17/2023 1157)  Mutually Determined Action Steps (Improved Behavioral Control): identifies future-oriented goal   Goal Outcome Evaluation:    Plan of Care Reviewed With: patient             Patient calm and cooperative. Mood sad/depressed. Affect consistent with his mood. Reported anxiety and depression both 3/10. Denied SI, SIB. Reported auditory hallucinations that are currently at its baseline.

## 2023-01-18 ENCOUNTER — APPOINTMENT (OUTPATIENT)
Dept: INTERPRETER SERVICES | Facility: CLINIC | Age: 53
End: 2023-01-18
Attending: PSYCHIATRY & NEUROLOGY
Payer: MEDICAID

## 2023-01-18 ENCOUNTER — APPOINTMENT (OUTPATIENT)
Dept: INTERPRETER SERVICES | Facility: CLINIC | Age: 53
End: 2023-01-18
Payer: MEDICAID

## 2023-01-18 PROCEDURE — G0177 OPPS/PHP; TRAIN & EDUC SERV: HCPCS

## 2023-01-18 PROCEDURE — 99231 SBSQ HOSP IP/OBS SF/LOW 25: CPT | Performed by: PSYCHIATRY & NEUROLOGY

## 2023-01-18 PROCEDURE — 250N000013 HC RX MED GY IP 250 OP 250 PS 637: Performed by: PSYCHIATRY & NEUROLOGY

## 2023-01-18 PROCEDURE — 124N000003 HC R&B MH SENIOR/ADOLESCENT

## 2023-01-18 RX ADMIN — OLANZAPINE 15 MG: 15 TABLET, FILM COATED ORAL at 20:20

## 2023-01-18 ASSESSMENT — ACTIVITIES OF DAILY LIVING (ADL)
ADLS_ACUITY_SCORE: 32
DRESS: INDEPENDENT
HYGIENE/GROOMING: INDEPENDENT
ADLS_ACUITY_SCORE: 32
HYGIENE/GROOMING: INDEPENDENT
ADLS_ACUITY_SCORE: 32
ORAL_HYGIENE: INDEPENDENT
DRESS: INDEPENDENT
ADLS_ACUITY_SCORE: 32
ORAL_HYGIENE: INDEPENDENT
ADLS_ACUITY_SCORE: 32
ADLS_ACUITY_SCORE: 32

## 2023-01-18 NOTE — PROGRESS NOTES
01/17/23 2200   Group Therapy Session   Time Session Began 1900   Time Session Ended 1950   Total Time (minutes) 45   Total # Attendees 3   Group Type expressive therapy   Group Topic Covered balanced lifestyle;emotions/expression;structured socialization   Group Session Detail Relaxation   Patient Response/Contribution cooperative with task   Patient Participation Detail Cooperatively engaged in Evening Music Relaxation group to decrease anxiety and promote sleep.  Present affect, appropriately engaged in session, responding well to the music.  With the 's help, was able to communicate his music preferences, which were, for example, Evie Lauper and other upbeat music.  Pleasant affect.

## 2023-01-18 NOTE — PLAN OF CARE
"Occupational Therapy Group Note:     01/18/23 1400   Group Therapy Session   Group Attendance attended group session   Time Session Began 1115   Time Session Ended 1200   Total Time (minutes) 45   Total # Attendees 3   Group Type life skill   Group Topic Covered balanced lifestyle;coping skills/lifestyle management;structured socialization   Group Session Detail coping skills discussion   Patient Response/Contribution cooperative with task;discussed personal experience with topic;expressed understanding of topic;listened actively;offered helpful suggestions to peers   Patient Participation Detail Pt actively participated in a structured occupational therapy topic group (accompanied by an ) involving identification of coping skills beginning with every letter of the alphabet facilitated through group discussion. Pt consistently contributed ideas of positive coping skills, and was receptive and respectful of ideas contributed by peers. Pt identified \"hygiene, making decisions, and avoiding bad thoughts\" as his most important coping skills, and identified \"obedience\" as a coping skill that he intends to start using more frequently. Pt was receptive to learning a new breathing technique to implement as a coping skill.         "

## 2023-01-18 NOTE — PLAN OF CARE
01/18/23 1600   Group Therapy Session   Group Attendance attended group session   Time Session Began 1300   Time Session Ended 1400   Total Time (minutes) 55   Total # Attendees 3   Group Type expressive therapy;task skill;life skill   Group Topic Covered balanced lifestyle;cognitive activities;relapse prevention;problem-solving;coping skills/lifestyle management;structured socialization   Group Session Detail Goal Outcome Evaluation:   Patient Response/Contribution cooperative with task;organized   Patient Participation Detail pt advocated for self today and located his  for group. pt was able to list 20 things to be grateful for with additional time. 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 pt was independent with supplies and task.

## 2023-01-18 NOTE — PLAN OF CARE
"Pt is alert and visible in the milieu. He eats meals in the dining room and is attending groups.   Pt assessed with assistance of Nigerian interpretor-  Pt reports his mood is \"good\". He denies anxiety or depression. He continues to endorse auditory hallucinations, states \"I hear them but they are quieter. They bother me sometimes but its ok.\" He denies any command hallucinations.   Denies SI/HI.     Pt denies any pain concerns.   No due medications this shift.     /82   Pulse 74   Temp 97  F (36.1  C) (Temporal)   Resp 16   Ht 1.626 m (5' 4\")   Wt 75.2 kg (165 lb 12.6 oz)   SpO2 99%   BMI 28.46 kg/m      Problem: Adult Behavioral Health Plan of Care  Goal: Plan of Care Review  Outcome: Progressing  Flowsheets (Taken 1/18/2023 1100)  Patient Agreement with Plan of Care: agrees     Problem: Psychotic Signs/Symptoms  Goal: Improved Sleep (Psychotic Signs/Symptoms)  Outcome: Progressing   Goal Outcome Evaluation:    Plan of Care Reviewed With: patient                   "

## 2023-01-18 NOTE — PLAN OF CARE
Occupational Therapy Group Note:     01/18/23 1100   Group Therapy Session   Group Attendance attended group session   Time Session Began 1020   Time Session Ended 1115   Total Time (minutes) 55   Total # Attendees 3   Group Type expressive therapy   Group Topic Covered coping skills/lifestyle management   Group Session Detail OT clinic   Patient Response/Contribution cooperative with task;organized   Patient Participation Detail Pt actively participated in occupational therapy clinic (accompanied by an ) to facilitate coping skill exploration, creative expression within personally meaningful activities, and clinical observation of social, cognitive, and kinesthetic performance skills. Pt response: Independent to initiate, gather materials, sequence, and adjust to workspace demands as needed. Demonstrated good focus, planning, and attention to detail for selected creative expression task. Able to ask for assistance as needed, and appeared comfortable interacting with peers and staff, though spent a majority of group quietly focused on his task.

## 2023-01-18 NOTE — PLAN OF CARE
Problem: Psychotic Signs/Symptoms  Goal: Improved Sleep (Psychotic Signs/Symptoms)  Outcome: Progressing   Goal Outcome Evaluation:    Patient slept soundly for a total of 9  hours. No complaints  made the whole night.

## 2023-01-18 NOTE — PLAN OF CARE
Goal Outcome Evaluation:    Plan of Care Reviewed With: patient          Pt calm, pleasant, cooperative. Eye contact and affect WDL. Attending and participating in therapeutic groups when  is present. Denied depression, anxiety, suicidal ideation. Reported auditory hallucinations are still present but less intense and do not command him to harm himself or any other person. Med-compliant. Appetite, sleep, hygiene good. Denies physical complaints, pain or side effects. Continue with current treatment plan and recommendations. Continue to monitor and reassess symptoms. Monitor response to medications. Monitor progress towards treatment goals. Encourage groups and participation.

## 2023-01-19 ENCOUNTER — APPOINTMENT (OUTPATIENT)
Dept: INTERPRETER SERVICES | Facility: CLINIC | Age: 53
End: 2023-01-19
Attending: PSYCHIATRY & NEUROLOGY
Payer: MEDICAID

## 2023-01-19 PROCEDURE — 124N000003 HC R&B MH SENIOR/ADOLESCENT

## 2023-01-19 PROCEDURE — G0177 OPPS/PHP; TRAIN & EDUC SERV: HCPCS

## 2023-01-19 PROCEDURE — 250N000013 HC RX MED GY IP 250 OP 250 PS 637: Performed by: PSYCHIATRY & NEUROLOGY

## 2023-01-19 PROCEDURE — 99231 SBSQ HOSP IP/OBS SF/LOW 25: CPT | Performed by: PSYCHIATRY & NEUROLOGY

## 2023-01-19 PROCEDURE — H2032 ACTIVITY THERAPY, PER 15 MIN: HCPCS

## 2023-01-19 RX ADMIN — OLANZAPINE 15 MG: 15 TABLET, FILM COATED ORAL at 20:18

## 2023-01-19 RX ADMIN — OLANZAPINE 10 MG: 10 TABLET, FILM COATED ORAL at 11:34

## 2023-01-19 ASSESSMENT — ACTIVITIES OF DAILY LIVING (ADL)
ADLS_ACUITY_SCORE: 32
ADLS_ACUITY_SCORE: 32
DRESS: INDEPENDENT
ADLS_ACUITY_SCORE: 32
HYGIENE/GROOMING: INDEPENDENT
LAUNDRY: WITH SUPERVISION
ADLS_ACUITY_SCORE: 32
LAUNDRY: WITH SUPERVISION
ADLS_ACUITY_SCORE: 32
HYGIENE/GROOMING: INDEPENDENT
ADLS_ACUITY_SCORE: 32
ADLS_ACUITY_SCORE: 32
ORAL_HYGIENE: INDEPENDENT
ORAL_HYGIENE: INDEPENDENT
ADLS_ACUITY_SCORE: 32
DRESS: INDEPENDENT
ADLS_ACUITY_SCORE: 32
ADLS_ACUITY_SCORE: 32

## 2023-01-19 NOTE — PLAN OF CARE
01/19/23 1518   Group Therapy Session   Group Attendance attended group session   Time Session Began 1500   Time Session Ended 1550   Total Time (minutes) 50   Total # Attendees 2   Group Type psychotherapeutic   Group Topic Covered problem-solving;cognitive activities;structured socialization   Group Session Detail  Activity using visuospatial concepts in problem solving   Patient Response/Contribution cooperative with task;listened actively;organized   Patient Participation Detail [place] the activity and successfully identified solutions with only 1 explanation. Pleasant. Offered answers in context through the .  Appears attentive and interested in actively participating. Offers direct eye contact with interactions.

## 2023-01-19 NOTE — PROGRESS NOTES
"PSYCHIATRY  PROGRESS NOTE     DATE OF SERVICE   01/18/2023       CHIEF COMPLAINT   \" I am doing okay\".     SUBJECTIVE   Nursing reports:  Pt is alert and visible in the milieu. He eats meals in the dining room and is attending groups.   Pt assessed with assistance of Kiswahili interpretor-  Pt reports his mood is \"good\". He denies anxiety or depression. He continues to endorse auditory hallucinations, states \"I hear them but they are quieter. They bother me sometimes but its ok.\" He denies any command hallucinations.   Denies SI/HI.      Pt denies any pain concerns.   No due medications this shift.      /82   Pulse 74   Temp 97  F (36.1  C) (Temporal)   Resp 16   Ht 1.626 m (5' 4\")   Wt 75.2 kg (165 lb 12.6 oz)   SpO2 99%   BMI 28.46 kg/m       Patient has been discussed with the  earlier today.  There has been no updates on the patient's medical assistance application status.     OBJECTIVE   Patient was seen and evaluated in the day area by himself, this was a face-to-face evaluation.  Interview was conducted in Kiswahili by this writer.  Patient verbalized that he is doing well and at this time denies any thoughts of harming himself.  He has been able to contract for safety.  Dated that he has continued to experience auditory hallucinations that come and go but this is not causing any stress.  Continues to be in agreement with taking the Zyprexa and with staying in the hospital voluntarily.    No lab results found in last 7 days.       MEDICATIONS   Medications:  Scheduled Meds:    OLANZapine  15 mg Oral At Bedtime     Continuous Infusions:  PRN Meds:.acetaminophen, alum & mag hydroxide-simethicone, hydrOXYzine, ibuprofen, OLANZapine **OR** OLANZapine, senna-docusate, traZODone    Medication adherence issues: MS Med Adherence Y/N: Yes, Hospitalization  Medication side effects: MEDICATION SIDE EFFECTS: no side effects reported  Benefit: Yes / No: Yes       ROS   A comprehensive review of " "systems was negative.       MENTAL STATUS EXAM   Vitals: /70   Pulse 89   Temp 97.1  F (36.2  C) (Temporal)   Resp 16   Ht 1.626 m (5' 4\")   Wt 75.2 kg (165 lb 12.6 oz)   SpO2 99%   BMI 28.46 kg/m        Appearance:  No apparent distress  Mood:  {Mood: Normal  Affect: full range  was congruent to speech  Suicidal Ideation: PRESENT / ABSENT: absent   Homicidal Ideation: PRESENT / ABSENT: absent   Thought process: [place]   Thought content: Auditory hallucinations that come and go  Fund of Knowledge: Below average  Attention/Concentration: Fair  Language ability:  Intact  Memory:  Immediate recall impaired, Short-term memory impaired and Long-term memory intact  Insight:  fair.  Judgement: fair  Orientation: Yes, x4  Psychomotor Behavior: normal or unremarkable    Muscle Strength and Tone: MuscleStrength: Normal  Gait and Station: Normal       LABS   personally reviewed.   No results found for this or any previous visit (from the past 24 hour(s)).  No lab results found in last 7 days.  No results found for: PHENYTOIN, PHENOBARB, VALPROATE, CBMZ       DIAGNOSIS   Principal Problem:    Schizophrenia, schizoaffective, chronic with acute exacerbation (H)    Active Problem List:  Patient Active Problem List   Diagnosis     Schizophrenia, schizoaffective, chronic with acute exacerbation (H)          PLAN   1. Ongoing education given regarding diagnostic and treatment options with risks, benefits and alternatives and adequate verbalization of understanding.  2.  Medications      Zyprexa to 15 mg at bedtime    3.  Medical team to follow the patient as needed.  4.   coordinating a safe discharge plan with the patient.      Risk Assessment: [facility] RISK ASSESSMENT: Patient able to contract for safety    Coordination of Care:   Treatment Plan reviewed and physician signed, Care discussed with Care/Treatment Team Members, Chart reviewed and Patient seen      Re-Certification I certify that the inpatient " psychiatric facility services furnished since the previous certification were, and continue to be, medically necessary for, either, treatment which could reasonably be expected to improve the patient s condition or diagnostic study and that the hospital records indicate that the services furnished were, either, intensive treatment services, admission and related services necessary for diagnostic study, or equivalent services.     I certify that the patient continues to need, on a daily basis, active treatment furnished directly by or requiring the supervision of inpatient psychiatric facility personnel.   I estimate 14 days of hospitalization is necessary for proper treatment of the patient. My plans for post-hospital care for this patient are  TBD     Paris Mccauley MD    -     01/18/2023  -     9:54 PM    Total time  25 minutes with > 50%spent on coordination of cares and psycho-education.    This note was created with help of Dragon dictation system. Grammatical / typing errors are not intentional.    Paris Mccauley MD

## 2023-01-19 NOTE — PLAN OF CARE
Assessment/Intervention/Current Symtoms and Care Coordination  CTC (writer)  reviewed pt's chart.   -Spoke with [employer] staff who confirmed that pt's application is still pending. Staff will be calling [facility] today to find out when pt's EMA might be opened. Complications related to multiple names associated with the patient has been identified as a possible barrier. Writer to receive feedback from [employer] today, on what they found out from [facility].  -Emergency MA is ongoing  -Was referred for [place] assessment and currently waiting: [place]ing on [place] funding  -Stabilization is ongoing      Discharge Plan or Goal  Pt is new and is undergoing stabilization. He is currently homeless      Barriers to Discharge   Safe discharge plan, ongoing symptom severity (highly disorganized, and having psychosis in the context of hearing voices), and medication evaluation/assessment.     Referral Status  Emergency Medical Assistance to be able to access resources  Legal Status  Voluntary

## 2023-01-19 NOTE — PROGRESS NOTES
"PSYCHIATRY  PROGRESS NOTE     DATE OF SERVICE   01/19/2023       CHIEF COMPLAINT   \" I am doing okay\".     SUBJECTIVE   Nursing reports:  Pt calm, pleasant, cooperative. Eye contact and affect WDL. Attending and participating in therapeutic groups when  is present. Denied depression, anxiety, suicidal ideation. Reported auditory hallucinations are still present but less intense and do not command him to harm himself or any other person. Med-compliant. Appetite, sleep, hygiene good. Denies physical complaints, pain or side effects. Continue with current treatment plan and recommendations. Continue to monitor and reassess symptoms. Monitor response to medications. Monitor progress towards treatment goals. Encourage groups and participation.     Patient has been discussed with the  earlier today.    Assessment/Intervention/Current Symtoms and Care Coordination  CTC (writer)  reviewed pt's chart.   -Spoke with [employer] staff who confirmed that pt's application is still pending. Staff will be calling [facility] today to find out when pt's EMA might be opened. Complications related to multiple names associated with the patient has been identified as a possible barrier. Writer to receive feedback from [employer] today, on what they found out from [facility].  -Emergency MA is ongoing  -Was referred for [place] assessment and currently waiting: [place]ing on [place] funding  -Stabilization is ongoing     OBJECTIVE   Patient was seen and evaluated in the day area by himself, this was a face-to-face evaluation.  Interview was conducted in Russian by this writer.  Patient reported that he has been doing well and denies any issues during the assessment.  I did provided the patient with an update about his medical assistance.  I discussed with the patient that this process may have been delayed because he was registered under different names.  Now this issue has been resolved " "and the medical assistance approval is ongoing.  Patient verbalized good understanding of this.  He is denying any other issues at this time and continued to contract for safety.    No lab results found in last 7 days.       MEDICATIONS   Medications:  Scheduled Meds:    OLANZapine  15 mg Oral At Bedtime     Continuous Infusions:  PRN Meds:.acetaminophen, alum & mag hydroxide-simethicone, hydrOXYzine, ibuprofen, OLANZapine **OR** OLANZapine, senna-docusate, traZODone    Medication adherence issues: MS Med Adherence Y/N: Yes, Hospitalization  Medication side effects: MEDICATION SIDE EFFECTS: no side effects reported  Benefit: Yes / No: Yes       ROS   A comprehensive review of systems was negative.       MENTAL STATUS EXAM   Vitals: /84   Pulse 88   Temp 97.6  F (36.4  C) (Temporal)   Resp 18   Ht 1.626 m (5' 4\")   Wt 75.2 kg (165 lb 12.6 oz)   SpO2 97%   BMI 28.46 kg/m      Same as last visit  Appearance:  No apparent distress  Mood:  {Mood: Normal  Affect: full range  was congruent to speech  Suicidal Ideation: PRESENT / ABSENT: absent   Homicidal Ideation: PRESENT / ABSENT: absent   Thought process: [place]   Thought content: Auditory hallucinations that come and go  Fund of Knowledge: Below average  Attention/Concentration: Fair  Language ability:  Intact  Memory:  Immediate recall impaired, Short-term memory impaired and Long-term memory intact  Insight:  fair.  Judgement: fair  Orientation: Yes, x4  Psychomotor Behavior: normal or unremarkable    Muscle Strength and Tone: MuscleStrength: Normal  Gait and Station: Normal       LABS   personally reviewed.   No results found for this or any previous visit (from the past 24 hour(s)).  No lab results found in last 7 days.  No results found for: PHENYTOIN, PHENOBARB, VALPROATE, CBMZ       DIAGNOSIS   Principal Problem:    Schizophrenia, schizoaffective, chronic with acute exacerbation (H)    Active Problem List:  Patient Active Problem List   Diagnosis "     Schizophrenia, schizoaffective, chronic with acute exacerbation (H)          PLAN   1. Ongoing education given regarding diagnostic and treatment options with risks, benefits and alternatives and adequate verbalization of understanding.  2.  Medications      Zyprexa to 15 mg at bedtime    3.  Medical team to follow the patient as needed.  4.   coordinating a safe discharge plan with the patient.      Risk Assessment: [facility] RISK ASSESSMENT: Patient able to contract for safety    Coordination of Care:   Treatment Plan reviewed and physician signed, Care discussed with Care/Treatment Team Members, Chart reviewed and Patient seen      Re-Certification I certify that the inpatient psychiatric facility services furnished since the previous certification were, and continue to be, medically necessary for, either, treatment which could reasonably be expected to improve the patient s condition or diagnostic study and that the hospital records indicate that the services furnished were, either, intensive treatment services, admission and related services necessary for diagnostic study, or equivalent services.     I certify that the patient continues to need, on a daily basis, active treatment furnished directly by or requiring the supervision of inpatient psychiatric facility personnel.   I estimate 14 days of hospitalization is necessary for proper treatment of the patient. My plans for post-hospital care for this patient are  TBD     Paris Mccauley MD    -     01/19/2023  -     2:14 PM    Total time  25 minutes with > 50%spent on coordination of cares and psycho-education.    This note was created with help of Dragon dictation system. Grammatical / typing errors are not intentional.    Paris Mccauley MD

## 2023-01-19 NOTE — PLAN OF CARE
"Problem: Depression  Goal: Improved Mood  Outcome: Progressing   Goal Outcome Evaluation:  Plan of Care Reviewed With: patient      VSS. Alert and oriented x4. Patient was visible in the milieu watching TV. Pt endorsed minimal depression. Pt continues to endorse auditory hallucination. Pt denied all other mental health symptoms. Patient described mood as \"normal\" and affect was flat. Patient is cooperative with cares. Patient is med-compliant, and ate 100% of his dinner. Reports \"sleeps through the night\". Pt attended group. No PRN meds given this shift. Pt is stable and waiting for placement. Patient evaluation continues.     VS reviewed: /84 (BP Location: Right arm)   Pulse 103   Temp 98.1  F (36.7  C)   Resp 18   Ht 1.626 m (5' 4\")   Wt 75.2 kg (165 lb 12.6 oz)   SpO2 96%   BMI 28.46 kg/m   .     Length of stay: 20                                                                              "

## 2023-01-19 NOTE — PROGRESS NOTES
Patient slept a total of 9 hours without any interruptions. No behavioral issues noted the whole shift.

## 2023-01-19 NOTE — PROGRESS NOTES
Pt participated in a dance/movement therapy group with individual nonverbal expressions that led to psychotherapeutic verbal themes including:  reaching and rising, punching or carving out space, rolling with the direction of the day, but also keeping ourselves above all that threatens to wash over us.  Pt was expansive in his movement, standing to dance and adding new qualities to the motions of others.  He was integrated with himself and others.  He used the support of a .       01/19/23 1130   Expressive Therapy   Therapy Type dance/movement   Minutes of Treatment 50

## 2023-01-19 NOTE — PLAN OF CARE
01/19/23 1428   Group Therapy Session   Group Attendance attended group session   Time Session Began 1315   Time Session Ended 1400   Total Time (minutes) 45   Total # Attendees 2   Group Type expressive therapy;life skill   Group Topic Covered cognitive activities;leisure exploration/use of leisure time;coping skills/lifestyle management;structured socialization;relaxation techniques   Group Session Detail OT Wellness Strategies Group-[place]   Patient Response/Contribution cooperative with task;organized;listened actively   Patient Participation Detail pt attended group with  present. pt was independent with supplies and task following initial verbal instructions and demonstration. pt worked quietly and meticulously for duration. pt worked on a more detailed design and color scheme. pt completed project during group time.

## 2023-01-19 NOTE — PLAN OF CARE
CTC:   Called [facility]  with pt and a  to inform them that the pt is now in [facility] and not [facility], as recommended by financial services staff. Was unable to get through to anybody ([phone])

## 2023-01-19 NOTE — PLAN OF CARE
CTC:   Spoke with financial services staff.  He requested that pt contacts [facility] (we will do this with a ) to let them know that the pt is currently in [facility] instead of [facility]. Pt's case is confusing due to multiple names being associated with him.    Pt's emergency MA ID: [id number]  Case Number: [id number]  [facility] Contact: [phone]

## 2023-01-19 NOTE — PLAN OF CARE
01/19/23 1240   Group Therapy Session   Group Attendance attended group session   Time Session Began 1015   Time Session Ended 1115   Total Time (minutes) 50   Total # Attendees 4   Group Type expressive therapy;task skill;recreation   Group Topic Covered cognitive activities;leisure exploration/use of leisure time;problem-solving;coping skills/lifestyle management   Group Session Detail Occupational Therapy Clinic group to facilitate coping skill exploration, use of cognitive skills and problem solving, creative expression, clinical observation and facilitation of social, cognitive, and kinesthetic performance skills   Patient Response/Contribution cooperative with task;organized   Patient Participation Detail pt attended group with  present. pt chose to engage in familiar task for duration of group. pt was independent with supplies and task. pt worked quietly for duration of group.

## 2023-01-19 NOTE — PLAN OF CARE
"Pt observed intermittently in his room otherwise attending groups most of the shift with the help of the . Pt presented with full range affect and calm mood. Pt assessed with the help of a  and upon checking in pt denied SI/SIB/HI/VH as well as anxiety and depression. Pt continues to endorse AH \"voices telling me that I will never be in a stable place.\" PRN Zyprexa was given.  Pt denied any physical discomfort at this time. Pt reported appetite and sleep as adequate. Pt's goal for the day is to take a shower. Staff will continue with the same POC.     "

## 2023-01-20 ENCOUNTER — APPOINTMENT (OUTPATIENT)
Dept: INTERPRETER SERVICES | Facility: CLINIC | Age: 53
End: 2023-01-20
Attending: PSYCHIATRY & NEUROLOGY
Payer: MEDICAID

## 2023-01-20 PROCEDURE — 250N000013 HC RX MED GY IP 250 OP 250 PS 637: Performed by: PSYCHIATRY & NEUROLOGY

## 2023-01-20 PROCEDURE — 124N000003 HC R&B MH SENIOR/ADOLESCENT

## 2023-01-20 PROCEDURE — G0177 OPPS/PHP; TRAIN & EDUC SERV: HCPCS

## 2023-01-20 PROCEDURE — 99231 SBSQ HOSP IP/OBS SF/LOW 25: CPT | Performed by: PSYCHIATRY & NEUROLOGY

## 2023-01-20 RX ADMIN — OLANZAPINE 15 MG: 15 TABLET, FILM COATED ORAL at 20:11

## 2023-01-20 ASSESSMENT — ACTIVITIES OF DAILY LIVING (ADL)
ADLS_ACUITY_SCORE: 32
ORAL_HYGIENE: INDEPENDENT
ADLS_ACUITY_SCORE: 32
DRESS: INDEPENDENT
HYGIENE/GROOMING: INDEPENDENT

## 2023-01-20 NOTE — PROGRESS NOTES
Patient slept a total of 3.5 hours without any interruptions. No behavioral issues raised the whole night.

## 2023-01-20 NOTE — PLAN OF CARE
01/20/23 1437   Group Therapy Session   Group Attendance attended group session   Time Session Began 1315   Time Session Ended 1400   Total Time (minutes) 45   Total # Attendees 3   Group Type expressive therapy;recreation;task skill   Group Topic Covered cognitive activities;leisure exploration/use of leisure time;coping skills/lifestyle management;problem-solving;relaxation techniques   Group Session Detail OT Wellness Strategies group   Patient Response/Contribution cooperative with task;organized   Patient Participation Detail pt actively engaged in group activity with  present. pt chose a small butterfly design for his project. pt worked quietly and methodically on project through completion. pt was independent with supplies and task.

## 2023-01-20 NOTE — PLAN OF CARE
01/20/23 1500   Group Therapy Session   Group Attendance attended group session   Time Session Began 1010   Time Session Ended 1115   Total Time (minutes) 60   Total # Attendees 5   Group Type expressive therapy;task skill;psychotherapeutic   Group Topic Covered problem-solving;cognitive activities;structured socialization   Group Session Detail   Occupational Therapy Clinic group to facilitate coping skill exploration, use of cognitive skills and problem solving, creative expression, clinical observation and facilitation of social, cognitive, and kinesthetic performance skills.    Patient Response/Contribution cooperative with task;organized   Patient Participation Detail  Worked on a variation of a familiar task using visuospatial problem solving. Was successful. Needed only 1 cue to identify 1 challenging step. Pt appears patient in identifying solutions. Pleasant answering questions through the . Offers very brief direct eye contact, focused mostly downward at his work. Appears focused and interested in being involved.

## 2023-01-20 NOTE — PROGRESS NOTES
"PSYCHIATRY  PROGRESS NOTE     DATE OF SERVICE   01/20/2023       CHIEF COMPLAINT   \" I am doing okay\".     SUBJECTIVE   Nursing reports:  VSS. Alert and oriented x4. Patient was visible in the milieu watching TV. Pt endorsed minimal depression. Pt continues to endorse auditory hallucination. Pt denied all other mental health symptoms. Patient described mood as \"normal\" and affect was flat. Patient is cooperative with cares. Patient is med-compliant, and ate 100% of his dinner. Reports \"sleeps through the night\". Pt attended group. No PRN meds given this shift. Pt is stable and waiting for placement. Patient evaluation continues.      VS reviewed: /84 (BP Location: Right arm)   Pulse 103   Temp 98.1  F (36.7  C)   Resp 18   Ht 1.626 m (5' 4\")   Wt 75.2 kg (165 lb 12.6 oz)   SpO2 96%   BMI 28.46 kg/m   .      Length of stay: 20     Patient has been discussed with the  earlier today.  Today we have not received any updates about patient's medical assistance application.     OBJECTIVE   Patient was seen and evaluated in the day area by himself, this was a face-to-face evaluation.  Interview was conducted in Malian by this writer.  Patient has continued to verbalize he is doing well and denies any issues.  Has been telling the staff that he has continued to experience auditory hallucinations that come and go but these are not bothering him.  Denies having any thoughts of harming himself and has been able to contract for safety.    No lab results found in last 7 days.       MEDICATIONS   Medications:  Scheduled Meds:    OLANZapine  15 mg Oral At Bedtime     Continuous Infusions:  PRN Meds:.acetaminophen, alum & mag hydroxide-simethicone, hydrOXYzine, ibuprofen, OLANZapine **OR** OLANZapine, senna-docusate, traZODone    Medication adherence issues: MS Med Adherence Y/N: Yes, Hospitalization  Medication side effects: MEDICATION SIDE EFFECTS: no side effects reported  Benefit: Yes / No: Yes   " "    ROS   A comprehensive review of systems was negative.       MENTAL STATUS EXAM   Vitals: BP (!) 128/90 (BP Location: Right arm, Patient Position: Sitting, Cuff Size: Adult Regular)   Pulse 81   Temp 97.3  F (36.3  C) (Temporal)   Resp 18   Ht 1.626 m (5' 4\")   Wt 75.2 kg (165 lb 12.6 oz)   SpO2 99%   BMI 28.46 kg/m      Same as last visit  Appearance:  No apparent distress  Mood:  {Mood: Normal  Affect: full range  was congruent to speech  Suicidal Ideation: PRESENT / ABSENT: absent   Homicidal Ideation: PRESENT / ABSENT: absent   Thought process: [place]   Thought content: Auditory hallucinations that come and go  Fund of Knowledge: Below average  Attention/Concentration: Fair  Language ability:  Intact  Memory:  Immediate recall impaired, Short-term memory impaired and Long-term memory intact  Insight:  fair.  Judgement: fair  Orientation: Yes, x4  Psychomotor Behavior: normal or unremarkable    Muscle Strength and Tone: MuscleStrength: Normal  Gait and Station: Normal       LABS   personally reviewed.   No results found for this or any previous visit (from the past 24 hour(s)).  No lab results found in last 7 days.  No results found for: PHENYTOIN, PHENOBARB, VALPROATE, CBMZ       DIAGNOSIS   Principal Problem:    Schizophrenia, schizoaffective, chronic with acute exacerbation (H)    Active Problem List:  Patient Active Problem List   Diagnosis     Schizophrenia, schizoaffective, chronic with acute exacerbation (H)          PLAN   1. Ongoing education given regarding diagnostic and treatment options with risks, benefits and alternatives and adequate verbalization of understanding.  2.  Medications      Zyprexa to 15 mg at bedtime    3.  Medical team to follow the patient as needed.  4.   coordinating a safe discharge plan with the patient.      Risk Assessment: [facility] RISK ASSESSMENT: Patient able to contract for safety    Coordination of Care:   Treatment Plan reviewed and physician " signed, Care discussed with Care/Treatment Team Members, Chart reviewed and Patient seen      Re-Certification I certify that the inpatient psychiatric facility services furnished since the previous certification were, and continue to be, medically necessary for, either, treatment which could reasonably be expected to improve the patient s condition or diagnostic study and that the hospital records indicate that the services furnished were, either, intensive treatment services, admission and related services necessary for diagnostic study, or equivalent services.     I certify that the patient continues to need, on a daily basis, active treatment furnished directly by or requiring the supervision of inpatient psychiatric facility personnel.   I estimate 14 days of hospitalization is necessary for proper treatment of the patient. My plans for post-hospital care for this patient are  TBD     Paris Mccauley MD    -     01/20/2023  -     3:27 PM    Total time  25 minutes with > 50%spent on coordination of cares and psycho-education.    This note was created with help of Dragon dictation system. Grammatical / typing errors are not intentional.    Paris Mccauley MD

## 2023-01-20 NOTE — PLAN OF CARE
Goal Outcome Evaluation:    Plan of Care Reviewed With: patient      Problem: Adult Behavioral Health Plan of Care  Goal: Plan of Care Review  Outcome: Progressing  Flowsheets (Taken 1/20/2023 0900)  Patient Agreement with Plan of Care: agrees     Problem: Psychotic Signs/Symptoms  Goal: Improved Behavioral Control (Psychotic Signs/Symptoms)  Outcome: Progressing  Pt was visible on the unit all shift. He has been working on crossword puzzles and appears calm and cooperative. He denied pain or discomfort, denied any disturbed thinking, no SI, HI and he contracted for safety. Assessment done via . Pt offered no complaint and no behavioral issues were noted.

## 2023-01-20 NOTE — PLAN OF CARE
"   01/20/23 1504   Group Therapy Session   Group Attendance attended group session   Time Session Began 1115   Time Session Ended 1205   Total Time (minutes) 50   Total # Attendees 5   Group Type psychotherapeutic;psychoeducation   Group Topic Covered coping skills/lifestyle management;structured socialization;balanced lifestyle;cognitive therapy techniques   Group Session Detail  participated in an activity focused on Stress management, identifying areas on one's life that are balanced and areas to chosen to focus on by setting helpful goals.   Patient Response/Contribution cooperative with task;organized;listened actively   Patient Participation Detail  Offered answers in context through the . Stated being involved in \"therapy and groups\" to be helpful. Identified 3 positive things about himself including \"being respectful and obeying\" what is expected.                              "

## 2023-01-21 PROCEDURE — 250N000013 HC RX MED GY IP 250 OP 250 PS 637: Performed by: PSYCHIATRY & NEUROLOGY

## 2023-01-21 PROCEDURE — 124N000003 HC R&B MH SENIOR/ADOLESCENT

## 2023-01-21 RX ADMIN — OLANZAPINE 15 MG: 15 TABLET, FILM COATED ORAL at 20:21

## 2023-01-21 ASSESSMENT — ACTIVITIES OF DAILY LIVING (ADL)
ADLS_ACUITY_SCORE: 32
ADLS_ACUITY_SCORE: 32
LAUNDRY: WITH SUPERVISION
DRESS: INDEPENDENT
HYGIENE/GROOMING: INDEPENDENT
ADLS_ACUITY_SCORE: 32
ORAL_HYGIENE: INDEPENDENT
ADLS_ACUITY_SCORE: 32
HYGIENE/GROOMING: INDEPENDENT
ORAL_HYGIENE: INDEPENDENT
ADLS_ACUITY_SCORE: 32
DRESS: INDEPENDENT

## 2023-01-21 NOTE — PLAN OF CARE
Goal Outcome Evaluation:    Plan of Care Reviewed With: patient          Pt calm, pleasant, cooperative. Eye contact and affect WDL. Attending and participating in therapeutic groups when  is present. Denies depression, anxiety, suicidal ideation. Reported auditory hallucinations are still present but less intense and do not command him to harm himself or any other person. Med-compliant. Appetite, sleep, hygiene good. Denies physical complaints, pain or side effects. Continue with current treatment plan and recommendations. Continue to monitor and reassess symptoms. Monitor response to medications. Monitor progress towards treatment goals. Encourage groups and participation.

## 2023-01-21 NOTE — PLAN OF CARE
Problem: Sleep Disturbance  Goal: Adequate Sleep/Rest  Outcome: Progressing   Goal Outcome Evaluation:    The patient appeared asleep at the beginning of the shift. There was no pain or medical concerns reported or observed. Pt slept 11 hours during this shift. Will continue to monitor.

## 2023-01-21 NOTE — PROGRESS NOTES
01/21/23 1514   Group Therapy Session   Group Attendance attended group session   Time Session Began 1115   Time Session Ended 1200   Total Time (minutes) 30   Total # Attendees 5   Group Type life skill   Group Session Detail Education provided and group discussion on the importance of participating in cognitive activities for brain health and as a means of coping with mental health.  Hands on practice with Left, Center, Right game for learning new information, use of repetition to assist with memory, sequencing, mood stabilization, attention/concentration, reality-based activity, healthy leisure, an opportunity to experience success and socialization.   Patient Participation Detail Pt joined group late and  was pleasant, though at one point he was sleeping and of limited assistance.  Pt was able to still participate in the activity, though with limited understanding and needing assist from staff and peers throughout his time while present.  PT was willing to accept assistance as needed, he was polite and engaged in the activity for the full time he was in group.  No charge.

## 2023-01-21 NOTE — PLAN OF CARE
Problem: Psychotic Signs/Symptoms  Goal: Improved Behavioral Control (Psychotic Signs/Symptoms)  Outcome: Progressing   Goal Outcome Evaluation:    Plan of Care Reviewed With: patient      Patient is calm, attending group activities and cooperative with care. He denies symptoms of anxiety, depression and SI/SIB. Patient continues to endorse auditory hallucinations but states the frequency has decreased. Patient denies pain and or medication side effects.

## 2023-01-22 PROCEDURE — 124N000003 HC R&B MH SENIOR/ADOLESCENT

## 2023-01-22 PROCEDURE — 250N000013 HC RX MED GY IP 250 OP 250 PS 637: Performed by: PSYCHIATRY & NEUROLOGY

## 2023-01-22 PROCEDURE — H2032 ACTIVITY THERAPY, PER 15 MIN: HCPCS

## 2023-01-22 RX ADMIN — OLANZAPINE 15 MG: 15 TABLET, FILM COATED ORAL at 20:11

## 2023-01-22 ASSESSMENT — ACTIVITIES OF DAILY LIVING (ADL)
ADLS_ACUITY_SCORE: 32
DRESS: INDEPENDENT
ADLS_ACUITY_SCORE: 32
HYGIENE/GROOMING: INDEPENDENT
ADLS_ACUITY_SCORE: 32
LAUNDRY: WITH SUPERVISION
DRESS: INDEPENDENT
ADLS_ACUITY_SCORE: 32
ORAL_HYGIENE: INDEPENDENT
ADLS_ACUITY_SCORE: 32
HYGIENE/GROOMING: INDEPENDENT
ORAL_HYGIENE: INDEPENDENT

## 2023-01-22 NOTE — PLAN OF CARE
Problem: Psychotic Signs/Symptoms  Goal: Improved Behavioral Control (Psychotic Signs/Symptoms)  Outcome: Progressing   Goal Outcome Evaluation:    Plan of Care Reviewed With: patient      Patient was visible in milieu in calm mood. He reports decreased auditory hallucinations, denies anxiety, depression and SI/SIB. Patient is eating and drinking adequately, is cooperative with care and denies any pain or discomfort.

## 2023-01-22 NOTE — PLAN OF CARE
Problem: Sleep Disturbance  Goal: Adequate Sleep/Rest  Outcome: Progressing   Goal Outcome Evaluation:    Patient slept a total of 9.5 hours. No behavioral issues noted the whole shift.

## 2023-01-23 ENCOUNTER — OFFICE VISIT (OUTPATIENT)
Dept: INTERPRETER SERVICES | Facility: CLINIC | Age: 53
End: 2023-01-23
Payer: MEDICAID

## 2023-01-23 ENCOUNTER — APPOINTMENT (OUTPATIENT)
Dept: INTERPRETER SERVICES | Facility: CLINIC | Age: 53
End: 2023-01-23
Payer: MEDICAID

## 2023-01-23 PROCEDURE — T1013 SIGN LANG/ORAL INTERPRETER: HCPCS | Mod: U3

## 2023-01-23 PROCEDURE — 124N000003 HC R&B MH SENIOR/ADOLESCENT

## 2023-01-23 PROCEDURE — 99231 SBSQ HOSP IP/OBS SF/LOW 25: CPT | Performed by: PSYCHIATRY & NEUROLOGY

## 2023-01-23 PROCEDURE — G0177 OPPS/PHP; TRAIN & EDUC SERV: HCPCS

## 2023-01-23 PROCEDURE — 250N000013 HC RX MED GY IP 250 OP 250 PS 637: Performed by: PSYCHIATRY & NEUROLOGY

## 2023-01-23 RX ADMIN — OLANZAPINE 15 MG: 15 TABLET, FILM COATED ORAL at 20:09

## 2023-01-23 ASSESSMENT — ACTIVITIES OF DAILY LIVING (ADL)
ADLS_ACUITY_SCORE: 32
LAUNDRY: WITH SUPERVISION
ADLS_ACUITY_SCORE: 32
ADLS_ACUITY_SCORE: 32
ORAL_HYGIENE: INDEPENDENT
ADLS_ACUITY_SCORE: 32
HYGIENE/GROOMING: INDEPENDENT
HYGIENE/GROOMING: INDEPENDENT
ADLS_ACUITY_SCORE: 32
DRESS: INDEPENDENT
ORAL_HYGIENE: INDEPENDENT
ADLS_ACUITY_SCORE: 32
ADLS_ACUITY_SCORE: 32
LAUNDRY: WITH SUPERVISION
DRESS: INDEPENDENT
ADLS_ACUITY_SCORE: 32

## 2023-01-23 NOTE — PLAN OF CARE
01/23/23 1453   Group Therapy Session   Group Attendance attended group session   Time Session Began 1300   Time Session Ended 1400   Total Time (minutes) 45   Total # Attendees 4   Group Type life skill;recreation;task skill   Group Topic Covered cognitive activities;coping skills/lifestyle management;problem-solving;leisure exploration/use of leisure time;structured socialization   Group Session Detail Occupational Therapy cognitive group-[place] activity for following directions, recall, turn taking, memory, symptom management, healthy distractions, healthy leisure exploration, and social engagement   Patient Response/Contribution cooperative with task;verbalizations were off topic   Patient Participation Detail pt arrived to group late with . pt agreeable to trial group activity. some responses appeared off topic however this is more of a language barrier. pt endorsed positiive response to activity.

## 2023-01-23 NOTE — PLAN OF CARE
Problem: Sleep Disturbance  Goal: Adequate Sleep/Rest  Outcome: Progressing   Goal Outcome Evaluation:    Patient slept soundly for a total of 10 hours. No complaints noted the whole night.

## 2023-01-23 NOTE — PROGRESS NOTES
"PSYCHIATRY  PROGRESS NOTE     DATE OF SERVICE   01/23/2023       CHIEF COMPLAINT   \" I am doing okay\".     SUBJECTIVE   Nursing reports:  Patient has been withdrawn and mostly isolative to his room. He continues to reports auditory hallucinations which he states have significantly decreased since admission. He state he no longer feels anxious or depressed, denies thoughts of self harm. Patient is eating and drinking adequately, denies any pain or discomfort.      Patient has been discussed with the  earlier today.  Today we have not received any updates about patient's medical assistance application.     OBJECTIVE   Patient was seen and evaluated in the day area by himself, this was a face-to-face evaluation.  Interview was conducted in [place]ic by this writer.  Patient presented as pleasant, calm and cooperative.  He is denying feeling depressed or anxious.  Continues to endorse having auditory hallucinations but as per patient this is not bothering him.  He denies any other concerns.    No lab results found in last 7 days.       MEDICATIONS   Medications:  Scheduled Meds:    OLANZapine  15 mg Oral At Bedtime     Continuous Infusions:  PRN Meds:.acetaminophen, alum & mag hydroxide-simethicone, hydrOXYzine, ibuprofen, OLANZapine **OR** OLANZapine, senna-docusate, traZODone    Medication adherence issues: MS Med Adherence Y/N: Yes, Hospitalization  Medication side effects: MEDICATION SIDE EFFECTS: no side effects reported  Benefit: Yes / No: Yes       ROS   A comprehensive review of systems was negative.       MENTAL STATUS EXAM   Vitals: /82   Pulse 70   Temp 97.3  F (36.3  C) (Temporal)   Resp 16   Ht 1.626 m (5' 4\")   Wt 76.8 kg (169 lb 5 oz)   SpO2 99%   BMI 29.06 kg/m      Same as last visit  Appearance:  No apparent distress  Mood:  {Mood: Normal  Affect: full range  was congruent to speech  Suicidal Ideation: PRESENT / ABSENT: absent   Homicidal Ideation: PRESENT / ABSENT: absent "   Thought process: [place]   Thought content: Auditory hallucinations that come and go  Fund of Knowledge: Below average  Attention/Concentration: Fair  Language ability:  Intact  Memory:  Immediate recall impaired, Short-term memory impaired and Long-term memory intact  Insight:  fair.  Judgement: fair  Orientation: Yes, x4  Psychomotor Behavior: normal or unremarkable    Muscle Strength and Tone: MuscleStrength: Normal  Gait and Station: Normal       LABS   personally reviewed.   No results found for this or any previous visit (from the past 24 hour(s)).  No lab results found in last 7 days.  No results found for: PHENYTOIN, PHENOBARB, VALPROATE, CBMZ       DIAGNOSIS   Principal Problem:    Schizophrenia, schizoaffective, chronic with acute exacerbation (H)    Active Problem List:  Patient Active Problem List   Diagnosis     Schizophrenia, schizoaffective, chronic with acute exacerbation (H)          PLAN   1. Ongoing education given regarding diagnostic and treatment options with risks, benefits and alternatives and adequate verbalization of understanding.  2.  Medications      Zyprexa to 15 mg at bedtime    3.  Medical team to follow the patient as needed.  4.   coordinating a safe discharge plan with the patient.      Risk Assessment: [facility] RISK ASSESSMENT: Patient able to contract for safety    Coordination of Care:   Treatment Plan reviewed and physician signed, Care discussed with Care/Treatment Team Members, Chart reviewed and Patient seen      Re-Certification I certify that the inpatient psychiatric facility services furnished since the previous certification were, and continue to be, medically necessary for, either, treatment which could reasonably be expected to improve the patient s condition or diagnostic study and that the hospital records indicate that the services furnished were, either, intensive treatment services, admission and related services necessary for diagnostic study, or  equivalent services.     I certify that the patient continues to need, on a daily basis, active treatment furnished directly by or requiring the supervision of inpatient psychiatric facility personnel.   I estimate 14 days of hospitalization is necessary for proper treatment of the patient. My plans for post-hospital care for this patient are  TBD     Paris Mccauley MD    -     01/23/2023  -     3:26 PM    Total time  25 minutes with > 50%spent on coordination of cares and psycho-education.    This note was created with help of Dragon dictation system. Grammatical / typing errors are not intentional.    Paris Mccauley MD

## 2023-01-23 NOTE — PROGRESS NOTES
01/22/23 2200   Group Therapy Session   Group Attendance attended group session   Time Session Began 1900   Time Session Ended 2000   Total Time (minutes) 60   Total # Attendees 4   Group Type expressive therapy   Group Topic Covered emotions/expression   Patient Response/Contribution cooperative with task     Art Therapy directive was to create collaborative group drawings. Pt were encouraged to move around the table every few minutes to contribute art to each drawing using a variety of drawing materials.  Goals of directive: to assess how the individual functions within a group dynamic, social interest, emotional expression, art media exploration.  Pt was an engaged participant, focused on task for the full duration of group. Pt contributed positively to each art piece and contributed positively to group discussion.  Pts mood was calm, pleasant participant.

## 2023-01-23 NOTE — PLAN OF CARE
Goal Outcome Evaluation:    Plan of Care Reviewed With: patient      Problem: Psychotic Signs/Symptoms  Goal: Improved Behavioral Control (Psychotic Signs/Symptoms)  Outcome: Progressing    Patient has been withdrawn and mostly isolative to his room. He continues to reports auditory hallucinations which he states have significantly decreased since admission. He state he no longer feels anxious or depressed, denies thoughts of self harm. Patient is eating and drinking adequately, denies any pain or discomfort.

## 2023-01-23 NOTE — PLAN OF CARE
"  Problem: Psychotic Signs/Symptoms  Goal: Improved Behavioral Control (Psychotic Signs/Symptoms)  Outcome: Progressing   Goal Outcome Evaluation:    Plan of Care Reviewed With: patient          Pt presents with a flat affect, calm mood. Pt reports having auditory hallucinations, sting it has improved. Reports the voices are telling him   \"You can't stay on one place\". Pt denies any other forms of hallucinations at this time, denies anxiety, depression, SI/SIB/HI.   Pt was out in the milieu, attending groups, watching TV and doing cross word puzzles.   Pt is eating and drinking adequately, medication compliant. He denies any concerns with elimination at this time, VSS.         "

## 2023-01-24 ENCOUNTER — APPOINTMENT (OUTPATIENT)
Dept: INTERPRETER SERVICES | Facility: CLINIC | Age: 53
End: 2023-01-24
Payer: MEDICAID

## 2023-01-24 PROCEDURE — H2032 ACTIVITY THERAPY, PER 15 MIN: HCPCS

## 2023-01-24 PROCEDURE — 250N000013 HC RX MED GY IP 250 OP 250 PS 637: Performed by: PSYCHIATRY & NEUROLOGY

## 2023-01-24 PROCEDURE — G0177 OPPS/PHP; TRAIN & EDUC SERV: HCPCS

## 2023-01-24 PROCEDURE — 124N000003 HC R&B MH SENIOR/ADOLESCENT

## 2023-01-24 RX ADMIN — OLANZAPINE 15 MG: 15 TABLET, FILM COATED ORAL at 20:51

## 2023-01-24 ASSESSMENT — ACTIVITIES OF DAILY LIVING (ADL)
ADLS_ACUITY_SCORE: 32
ORAL_HYGIENE: INDEPENDENT
ADLS_ACUITY_SCORE: 32
HYGIENE/GROOMING: INDEPENDENT
ADLS_ACUITY_SCORE: 32
LAUNDRY: WITH SUPERVISION
ORAL_HYGIENE: INDEPENDENT
ADLS_ACUITY_SCORE: 32
HYGIENE/GROOMING: INDEPENDENT
ADLS_ACUITY_SCORE: 32
ADLS_ACUITY_SCORE: 32
DRESS: INDEPENDENT
DRESS: INDEPENDENT

## 2023-01-24 NOTE — PLAN OF CARE
Occupational Therapy       01/24/23 1200   Group Therapy Session   Group Attendance attended group session   Time Session Began 1015   Time Session Ended 1115   Total Time (minutes) 60   Total # Attendees 3   Group Type task skill   Group Topic Covered cognitive activities;leisure exploration/use of leisure time;problem-solving;structured socialization   Group Session Detail OT: Education on healthy activity engagement and creative hands on endeavor (OT clinic) to increase concentration, focus, attention to task/detail, decision making, problem solving, frustration tolerance, task follow through, coping with stress, healthy leisure engagement, creative expression, and social engagement   Patient Response/Contribution cooperative with task;other (see comments);organized  (pleasnt; cooperative; engaged on approach)   Patient Participation Detail Pt sat among peers to complete selected novel creative hands endeavor. Pt able to follow verbal directions and visual demonstration for activity. After selecting task and being given supplies, pt attended to task continuously for remainder of group. Pt worked in a semi-neat and tidy manner to complete project. Pt reported he enjoys the activity and is looking forward to seeing the completed project.

## 2023-01-24 NOTE — PLAN OF CARE
Problem: Psychotic Signs/Symptoms  Goal: Improved Behavioral Control (Psychotic Signs/Symptoms)  Outcome: Progressing   Goal Outcome Evaluation:    Plan of Care Reviewed With: patient      Patient is visible in milieu in calm mood and attending group activities. Patient had  present during group activities and check in with staff. He reports improved mood, decreased auditory hallucinations, denies anxiety, depression and SI/SIB.

## 2023-01-24 NOTE — PROGRESS NOTES
"Pt was an engaged participant in dance/movement therapy D/MT utilizing both rhythmic organizing and fluid vitalization.  Pt remained seated some of the time but also stood to dance and he indicated it's helpful for him to move his feet.  Pt was able to connect all parts of his body into creative somatic expression and he stated he felt \"more relaxed\" after the D/MT session.       01/24/23 1115   Expressive Therapy   Therapy Type dance/movement   Minutes of Treatment 50       "

## 2023-01-24 NOTE — PLAN OF CARE
Occupational Therapy       01/24/23 1400   Group Therapy Session   Group Attendance attended group session   Time Session Began 1300   Time Session Ended 1400   Total Time (minutes) 60   Total # Attendees 4   Group Type recreation   Group Topic Covered cognitive activities;leisure exploration/use of leisure time;structured socialization   Group Session Detail OT: Education on cognitive wellness and interactive social activity (Doodle Dice) to increase concentration, focus, attention to task/detail, task follow through, memory recall, visual processing/scanning skills, healthy leisure engagement, coping with stress, heathy distraction engagement, cognitive wellness, social wellness, and social engagement   Patient Response/Contribution cooperative with task;other (see comments)  (pleasant, cooperative)   Patient Participation Detail Pt able to follow verbal directions and visual demonstration for activity. Pt accepted assistance from peer prior to the arrival of ; pt required intermittent reminders of rules for activity. Pt sat among peers to complete presented novel activity but did not engage in social interactions with peers; lack of socialization may have been due to language difference. Pt reported he enjoyed the activity.

## 2023-01-24 NOTE — PROGRESS NOTES
Behavioral Health  Note    Behavioral Health  Spirituality Group Note    [address]    Name:    Juan Ordoñez                                                                     YOB: 1970    MRN:     [id number]                                                                       Age: 52 year old      Patient attended -led group, which included discussion of spirituality, coping with illness and building resilience.    Patient attended group for [facility] - spirituality groups are not billed.    The patient actively participated in group discussion      Thomas Minor, PhD  Associate

## 2023-01-24 NOTE — PLAN OF CARE
Goal Outcome Evaluation:       Patient had uninterrupted sleep for 8.5 hours the whole night. No behavioral issues observed. No concerns raised.

## 2023-01-24 NOTE — PLAN OF CARE
Assessment/Intervention/Current Symtoms and Care Coordination  CTC (writer)  reviewed pt's chart.   -Awaiting on [facility] referral feedback  -Made referral for relocation services  -Referral/consult with [place] [employer] for family connection/assistance: Met with the pt and he gave approval  Discharge Plan or Goal  Pt is new and is undergoing stabilization. He is currently homeless   Might discharge to a crisis bed while waiting for assisted living     Barriers to Discharge   Safe discharge plan, ongoing symptom severity (highly disorganized, and having psychosis in the context of hearing voices), and medication evaluation/assessment.     Referral Status  Emergency Medical Assistance to be able to access resources  Relocation assistance     Legal Status  Voluntary

## 2023-01-25 ENCOUNTER — APPOINTMENT (OUTPATIENT)
Dept: INTERPRETER SERVICES | Facility: CLINIC | Age: 53
End: 2023-01-25
Payer: MEDICAID

## 2023-01-25 PROCEDURE — G0177 OPPS/PHP; TRAIN & EDUC SERV: HCPCS

## 2023-01-25 PROCEDURE — 99231 SBSQ HOSP IP/OBS SF/LOW 25: CPT | Performed by: PSYCHIATRY & NEUROLOGY

## 2023-01-25 PROCEDURE — 250N000013 HC RX MED GY IP 250 OP 250 PS 637: Performed by: PSYCHIATRY & NEUROLOGY

## 2023-01-25 PROCEDURE — 124N000003 HC R&B MH SENIOR/ADOLESCENT

## 2023-01-25 RX ADMIN — OLANZAPINE 15 MG: 15 TABLET, FILM COATED ORAL at 20:04

## 2023-01-25 ASSESSMENT — ACTIVITIES OF DAILY LIVING (ADL)
ADLS_ACUITY_SCORE: 32
ADLS_ACUITY_SCORE: 32
ORAL_HYGIENE: INDEPENDENT
HYGIENE/GROOMING: INDEPENDENT
ADLS_ACUITY_SCORE: 32
DRESS: INDEPENDENT
DRESS: INDEPENDENT
ADLS_ACUITY_SCORE: 32
HYGIENE/GROOMING: INDEPENDENT
LAUNDRY: WITH SUPERVISION
ADLS_ACUITY_SCORE: 32
ORAL_HYGIENE: INDEPENDENT

## 2023-01-25 NOTE — PLAN OF CARE
Problem: Psychotic Signs/Symptoms  Goal: Improved Behavioral Control (Psychotic Signs/Symptoms)  Outcome: Progressing   Goal Outcome Evaluation:    Patient continues to display calm mood, has been attending and participating in group activities. He reports much improvement in mood including decreased auditory hallucinations. Patient denies anxiety, depression and SI/SIB.

## 2023-01-25 NOTE — PLAN OF CARE
Assessment/Intervention/Current Symtoms and Care Coordination  CTC (writer)  reviewed pt's chart.   -Has secured emergency medical assitance  -IRTS referrals  -Stabilization is ongoing      Discharge Plan or Goal  IRTS referrals     Barriers to Discharge   Safe discharge plan, ongoing symptom severity (highly disorganized, and having psychosis in the context of hearing voices), and medication evaluation/assessment.  Housing     Referral Status  IRTS  Legal Status  Voluntary

## 2023-01-25 NOTE — PLAN OF CARE
01/25/23 1450   Group Therapy Session   Group Attendance attended group session   Time Session Began 1100   Time Session Ended 1150   Total Time (minutes) 45   Total # Attendees 3   Group Type life skill;task skill   Group Topic Covered cognitive activities;coping skills/lifestyle management;problem-solving;self-care activities;structured socialization   Group Session Detail OT Wellness Group-Aromatherapy/Olfactory sense exploration for coping skill buidling, self care, symptom management, healthy distraction, social engagement, and insight development   Patient Response/Contribution cooperative with task;organized   Patient Participation Detail pt worked quietly and independently for duration of group activity.  was present to assist with reviewing essential oils and their uses. pt chose a couple blends to use for his lotion. pt shared he was content with his finished project. pt remains quiet during group and responds with brief, short or one word answers to questions.

## 2023-01-25 NOTE — PLAN OF CARE
Problem: Sleep Disturbance  Goal: Adequate Sleep/Rest  Outcome: Progressing   Goal Outcome Evaluation:    Patient slept for a total of 9.5 hours. No behavioral issues observed during the night.

## 2023-01-25 NOTE — PLAN OF CARE
01/25/23 1503   Group Therapy Session   Group Attendance attended group session   Time Session Began 1300   Time Session Ended 1350   Total Time (minutes) 45   Group Type life skill;recreation;task skill   Group Topic Covered cognitive activities;coping skills/lifestyle management;problem-solving;leisure exploration/use of leisure time;structured socialization   Group Session Detail OT Cognition Group-[place] for focus, concentration, memory, recall, coping skill building, symptom management, healthy distraction, and healthy leisure exploration   Patient Response/Contribution cooperative with task   Patient Participation Detail pt worked quietly for duration.  present to assist with explanation of activity. pt demonstrated understanding of instructions of activity. pt was independent with task following initial instructions and demonstration. pt was able to find at least 10 items from each picture-pt was able to answer questions in Romanian which was beneficial for overal understanding of activity and purpose

## 2023-01-25 NOTE — PLAN OF CARE
CTC: Met with the pt and he signed ROIs for [facility], [facility], [facility] case management. Referrals made.

## 2023-01-25 NOTE — PROGRESS NOTES
01/24/23 2000   Group Therapy Session   Group Attendance attended group session   Time Session Began 1900   Time Session Ended 1950   Total Time (minutes) 50   Total # Attendees 2   Group Type recreation   Group Topic Covered relaxation techniques   Group Session Detail stress reduction   Patient Response/Contribution cooperative with task   Patient Participation Detail Pt actively participated in a structured Therapeutic Recreation group with a focus on leisure participation, socializing, and exercise. Pt participated in the guided exercise for the full duration of the group. Pt followed along, engaged in the guided chair exercise routine and added to the discussion prompts throughout the routine.  Pt was encouraged to use positive imagery with the deep breathing and stretching to foster relaxation, improves focus, and reduce stress.

## 2023-01-25 NOTE — PLAN OF CARE
01/25/23 1438   Group Therapy Session   Group Attendance attended group session   Time Session Began 1015   Time Session Ended 1100   Total Time (minutes) 45   Total # Attendees 3   Group Type life skill;task skill;recreation   Group Topic Covered cognitive activities;leisure exploration/use of leisure time;problem-solving   Group Session Detail OT: Education on healthy activity engagement and creative hands on endeavor (OT clinic) to increase concentration, focus, attention to task/detail, decision making, problem solving, frustration tolerance, task follow through, coping with stress, healthy leisure engagement, creative expression, and social engagement   Patient Response/Contribution cooperative with task;organized   Patient Participation Detail pt arrived to group with  present. pt chose to engage in familiar task. pt continues to remain quiet during group activities. pt worked independently and meticulously on scratch art for duration. pt will respond briefly to questions but does not initiate conversations or interactions

## 2023-01-26 ENCOUNTER — APPOINTMENT (OUTPATIENT)
Dept: INTERPRETER SERVICES | Facility: CLINIC | Age: 53
End: 2023-01-26
Payer: MEDICAID

## 2023-01-26 PROCEDURE — H2032 ACTIVITY THERAPY, PER 15 MIN: HCPCS

## 2023-01-26 PROCEDURE — 124N000003 HC R&B MH SENIOR/ADOLESCENT

## 2023-01-26 PROCEDURE — 99231 SBSQ HOSP IP/OBS SF/LOW 25: CPT | Performed by: PSYCHIATRY & NEUROLOGY

## 2023-01-26 PROCEDURE — G0177 OPPS/PHP; TRAIN & EDUC SERV: HCPCS

## 2023-01-26 PROCEDURE — 250N000013 HC RX MED GY IP 250 OP 250 PS 637: Performed by: PSYCHIATRY & NEUROLOGY

## 2023-01-26 RX ADMIN — OLANZAPINE 15 MG: 15 TABLET, FILM COATED ORAL at 20:19

## 2023-01-26 ASSESSMENT — ACTIVITIES OF DAILY LIVING (ADL)
LAUNDRY: WITH SUPERVISION
ORAL_HYGIENE: INDEPENDENT
LAUNDRY: WITH SUPERVISION
ADLS_ACUITY_SCORE: 32
HYGIENE/GROOMING: INDEPENDENT
ADLS_ACUITY_SCORE: 32
ADLS_ACUITY_SCORE: 32
DRESS: INDEPENDENT
ADLS_ACUITY_SCORE: 32
ADLS_ACUITY_SCORE: 32
DRESS: INDEPENDENT
HYGIENE/GROOMING: INDEPENDENT
ADLS_ACUITY_SCORE: 32
ORAL_HYGIENE: INDEPENDENT
ADLS_ACUITY_SCORE: 32

## 2023-01-26 NOTE — PLAN OF CARE
01/25/23 2000   Group Therapy Session   Group Attendance attended group session   Time Session Began 1900   Time Session Ended 1945   Total Time (minutes) 44   Total # Attendees 2-4   Group Type task skill   Group Topic Covered coping skills/lifestyle management   Patient Response/Contribution cooperative with task;organized   Patient Participation Detail See Note     Pt independently attended topic group today and was cooperative.  Pt demonstrated a good understanding of the topic covered and fair insight into their own specific issues related to topic.  was present entire group. Pt's affect was pleasant and attention span was good. Pt reported feeling relaxed and engaged in the activity.    Pt will continue to be encouraged to attend groups for ongoing assessment and to work toward goals identified on plan of care.

## 2023-01-26 NOTE — PLAN OF CARE
"   01/26/23 1519   Group Therapy Session   Group Attendance attended group session   Time Session Began 1405   Time Session Ended 1450   Total Time (minutes) 45   Total # Attendees 3   Group Type expressive therapy;psychoeducation;psychotherapeutic;task skill   Group Topic Covered coping skills/lifestyle management;cognitive activities;cognitive therapy techniques;structured socialization;balanced lifestyle;problem-solving   Group Session Detail Topic of positive self thinking   Patient Response/Contribution cooperative with task;listened actively;organized;discussed personal experience with topic   Patient Participation Detail Offered answers in context through the  and elaborated in content. Talked about when he was a \"\" for work and enjoyed this. Offered positive self comments. Pleasant.                             "

## 2023-01-26 NOTE — PLAN OF CARE
Assessment/Intervention/Current Symtoms and Care Coordination  CTC (writer)  reviewed pt's chart.   -Awaiting on [facility] referral feedback  -With Emergency MA - referring for relocation service coordination - awaiting feedback    Discharge Plan or Goal  Pt is new and is undergoing stabilization. He is currently homeless      Barriers to Discharge   Safe discharge plan, ongoing symptom severity (highly disorganized, and having psychosis in the context of hearing voices), and medication evaluation/assessment.     Referral Status  Emergency Medical Assistance to be able to access resources  Relocation assistance     Legal Status  Voluntary

## 2023-01-26 NOTE — PLAN OF CARE
01/26/23 1441   Group Therapy Session   Group Attendance attended group session   Time Session Began 1310   Time Session Ended 1400   Total Time (minutes) 50   Total # Attendees 4   Group Type life skill;task skill;recreation   Group Topic Covered cognitive activities;coping skills/lifestyle management;problem-solving;leisure exploration/use of leisure time;self-care activities   Group Session Detail OT Wellness Group-Movement Bingo for physical movement, following directions, social engagement, turn taking, coping skill building, symptom management, and general wellness   Patient Response/Contribution cooperative with task   Patient Participation Detail pt actively engaged in group activities. pt requested assistance from his  for bingo card management due to language barrier. pt participated in 100% of seated movements by following therapist's demonstration and verbal instructions. pt endorsed positive response to movement. pt shared his favorite season is Summer.

## 2023-01-26 NOTE — PLAN OF CARE
Problem: Psychotic Signs/Symptoms  Goal: Improved Sleep (Psychotic Signs/Symptoms)  Outcome: Progressing   Goal Outcome Evaluation:    Slept a total of 7 hours without interruptions. No behavioral issues noted the whole shift.

## 2023-01-26 NOTE — PROGRESS NOTES
"PSYCHIATRY  PROGRESS NOTE     DATE OF SERVICE   01/25/2023       CHIEF COMPLAINT   \" I am doing okay\".     SUBJECTIVE   Nursing reports:  Patient continues to display calm mood, has been attending and participating in group activities. He reports much improvement in mood including decreased auditory hallucinations. Patient denies anxiety, depression and SI/SIB.      Patient has been discussed with the  earlier today.    CTC: Met with the pt and he signed ROIs for [facility] case management. Referrals made.     OBJECTIVE   Patient was seen and evaluated in the day area by himself, this was a face-to-face evaluation.  Interview was conducted in Turkish by this writer.  Patient remains stable and no behaviors have been reported. Continues to report AH that are not bothering him. Patient has received an update about his discharge and is in agreement with the  to contact the [facility] in order to get information about his family in [place].    No lab results found in last 7 days.       MEDICATIONS   Medications:  Scheduled Meds:    OLANZapine  15 mg Oral At Bedtime     Continuous Infusions:  PRN Meds:.acetaminophen, alum & mag hydroxide-simethicone, hydrOXYzine, ibuprofen, OLANZapine **OR** OLANZapine, senna-docusate, traZODone    Medication adherence issues: MS Med Adherence Y/N: Yes, Hospitalization  Medication side effects: MEDICATION SIDE EFFECTS: no side effects reported  Benefit: Yes / No: Yes       ROS   A comprehensive review of systems was negative.       MENTAL STATUS EXAM   Vitals: /82 (BP Location: Right arm, Patient Position: Sitting, Cuff Size: Adult Regular)   Pulse 87   Temp 97.7  F (36.5  C) (Temporal)   Resp 16   Ht 1.626 m (5' 4\")   Wt 76 kg (167 lb 8.8 oz)   SpO2 97%   BMI 28.76 kg/m      Same as last visit  Appearance:  No apparent distress  Mood:  {Mood: Normal  Affect: full range  was congruent to speech  Suicidal Ideation: PRESENT / ABSENT: " absent   Homicidal Ideation: PRESENT / ABSENT: absent   Thought process: [place]   Thought content: Auditory hallucinations that come and go  Fund of Knowledge: Below average  Attention/Concentration: Fair  Language ability:  Intact  Memory:  Immediate recall impaired, Short-term memory impaired and Long-term memory intact  Insight:  fair.  Judgement: fair  Orientation: Yes, x4  Psychomotor Behavior: normal or unremarkable    Muscle Strength and Tone: MuscleStrength: Normal  Gait and Station: Normal       LABS   personally reviewed.   No results found for this or any previous visit (from the past 24 hour(s)).  No lab results found in last 7 days.  No results found for: PHENYTOIN, PHENOBARB, VALPROATE, CBMZ       DIAGNOSIS   Principal Problem:    Schizophrenia, schizoaffective, chronic with acute exacerbation (H)    Active Problem List:  Patient Active Problem List   Diagnosis     Schizophrenia, schizoaffective, chronic with acute exacerbation (H)          PLAN   1. Ongoing education given regarding diagnostic and treatment options with risks, benefits and alternatives and adequate verbalization of understanding.  2.  Medications      Zyprexa to 15 mg at bedtime    3.  Medical team to follow the patient as needed.  4.   coordinating a safe discharge plan with the patient.      Risk Assessment: [facility] RISK ASSESSMENT: Patient able to contract for safety    Coordination of Care:   Treatment Plan reviewed and physician signed, Care discussed with Care/Treatment Team Members, Chart reviewed and Patient seen      Re-Certification I certify that the inpatient psychiatric facility services furnished since the previous certification were, and continue to be, medically necessary for, either, treatment which could reasonably be expected to improve the patient s condition or diagnostic study and that the hospital records indicate that the services furnished were, either, intensive treatment services, admission  and related services necessary for diagnostic study, or equivalent services.     I certify that the patient continues to need, on a daily basis, active treatment furnished directly by or requiring the supervision of inpatient psychiatric facility personnel.   I estimate 14 days of hospitalization is necessary for proper treatment of the patient. My plans for post-hospital care for this patient are  TBD     Paris Mccauley MD    -     01/25/2023  -     8:25 PM    Total time  25 minutes with > 50%spent on coordination of cares and psycho-education.    This note was created with help of Dragon dictation system. Grammatical / typing errors are not intentional.    Paris Mccauley MD

## 2023-01-26 NOTE — PROGRESS NOTES
"PSYCHIATRY  PROGRESS NOTE     DATE OF SERVICE   01/26/2023       CHIEF COMPLAINT   \" I am doing okay\".     SUBJECTIVE   Nursing reports:  Remains calm, pleasant and cooperative. Appropriately engaged with peers and staff. Denies all psych symptoms. Patient out in the milieu socializing. Patient did  not have  until late but he was able to attend the group this morning. He was active and engaging in the morning group.When writer wanted to do a complete shift assessment with him, he was out to the afternoon group.  Patient did not report any concerns this shift . Staff will continue to monitor and assist as needed.     Patient has been discussed with the  earlier today.    Assessment/Intervention/Current Symtoms and Care Coordination  CTC (writer)  reviewed pt's chart.   -Awaiting on [facility] referral feedback  -With Emergency MA - referring for relocation service coordination - awaiting feedback    Discharge Plan or Goal  Pt is new and is undergoing stabilization. He is currently homeless      Barriers to Discharge   Safe discharge plan, ongoing symptom severity (highly disorganized, and having psychosis in the context of hearing voices), and medication evaluation/assessment.     Referral Status  Emergency Medical Assistance to be able to access resources  Relocation assistance     Legal Status  Voluntary     OBJECTIVE   Patient was seen and evaluated in the day area by himself, this was a face-to-face evaluation.  Interview was conducted in [place] by this writer.  Patient reported that he is doing well and at this time he denies any concerns.  Patient admitted that he has continued to have auditory hallucinations but these are not bothering him.  Patient has been updated about the discharge plans and that at this time we are waiting on him to get an assessment from the [facility].  Patient verbalized understanding and continues to be in agreement with staying in the hospital and taking his " "medications.    No lab results found in last 7 days.       MEDICATIONS   Medications:  Scheduled Meds:    OLANZapine  15 mg Oral At Bedtime     Continuous Infusions:  PRN Meds:.acetaminophen, alum & mag hydroxide-simethicone, hydrOXYzine, ibuprofen, OLANZapine **OR** OLANZapine, senna-docusate, traZODone    Medication adherence issues: MS Med Adherence Y/N: Yes, Hospitalization  Medication side effects: MEDICATION SIDE EFFECTS: no side effects reported  Benefit: Yes / No: Yes       ROS   A comprehensive review of systems was negative.       MENTAL STATUS EXAM   Vitals: /88 (BP Location: Right arm)   Pulse 74   Temp 98.2  F (36.8  C)   Resp 16   Ht 1.626 m (5' 4\")   Wt 76.6 kg (168 lb 14 oz)   SpO2 97%   BMI 28.99 kg/m      Same as last visit (mental status examination has not changed for the last week)  Appearance:  No apparent distress  Mood:  {Mood: Normal  Affect: full range  was congruent to speech  Suicidal Ideation: PRESENT / ABSENT: absent   Homicidal Ideation: PRESENT / ABSENT: absent   Thought process: [place]   Thought content: Auditory hallucinations that come and go  Fund of Knowledge: Below average  Attention/Concentration: Fair  Language ability:  Intact  Memory:  Immediate recall impaired, Short-term memory impaired and Long-term memory intact  Insight:  fair.  Judgement: fair  Orientation: Yes, x4  Psychomotor Behavior: normal or unremarkable    Muscle Strength and Tone: MuscleStrength: Normal  Gait and Station: Normal       LABS   personally reviewed.   No results found for this or any previous visit (from the past 24 hour(s)).  No lab results found in last 7 days.  No results found for: PHENYTOIN, PHENOBARB, VALPROATE, CBMZ       DIAGNOSIS   Principal Problem:    Schizophrenia, schizoaffective, chronic with acute exacerbation (H)    Active Problem List:  Patient Active Problem List   Diagnosis     Schizophrenia, schizoaffective, chronic with acute exacerbation (H)          PLAN   1. " Ongoing education given regarding diagnostic and treatment options with risks, benefits and alternatives and adequate verbalization of understanding.  2.  Medications      Zyprexa to 15 mg at bedtime    3.  Medical team to follow the patient as needed.  4.   coordinating a safe discharge plan with the patient.      Risk Assessment: [facility] RISK ASSESSMENT: Patient able to contract for safety    Coordination of Care:   Treatment Plan reviewed and physician signed, Care discussed with Care/Treatment Team Members, Chart reviewed and Patient seen      Re-Certification I certify that the inpatient psychiatric facility services furnished since the previous certification were, and continue to be, medically necessary for, either, treatment which could reasonably be expected to improve the patient s condition or diagnostic study and that the hospital records indicate that the services furnished were, either, intensive treatment services, admission and related services necessary for diagnostic study, or equivalent services.     I certify that the patient continues to need, on a daily basis, active treatment furnished directly by or requiring the supervision of inpatient psychiatric facility personnel.   I estimate 14 days of hospitalization is necessary for proper treatment of the patient. My plans for post-hospital care for this patient are  TBD     Paris Mccauley MD    -     01/26/2023  -     2:40 PM    Total time  25 minutes with > 50%spent on coordination of cares and psycho-education.    This note was created with help of Dragon dictation system. Grammatical / typing errors are not intentional.    Paris Mccauley MD

## 2023-01-26 NOTE — PLAN OF CARE
Problem: Psychotic Signs/Symptoms  Goal: Improved Behavioral Control (Psychotic Signs/Symptoms)  Outcome: Progressing   Remains calm, pleasant and cooperative. Appropriately engaged with peers and staff. Denies all psych symptoms. Patient out in the milieu socializing. Patient did  not have  until late but he was able to attend the group this morning. He was active and engaging in the morning group.When writer wanted to do a complete shift assessment with him, he was out to the afternoon group.  Patient did not report any concerns this shift . Staff will continue to monitor and assist as needed.

## 2023-01-26 NOTE — PLAN OF CARE
01/26/23 1147   Group Therapy Session   Group Attendance attended group session   Time Session Began 1015   Time Session Ended 1115   Total Time (minutes) 50   Total # Attendees 5   Group Type life skill;recreation;task skill   Group Topic Covered cognitive activities;coping skills/lifestyle management;problem-solving;leisure exploration/use of leisure time;structured socialization   Group Session Detail Occupational Therapy Clinic group to facilitate coping skill exploration, use of cognitive skills and problem solving, creative expression, clinical observation and facilitation of social, cognitive, and kinesthetic performance skills.   Patient Response/Contribution cooperative with task   Patient Participation Detail pt arrived to group with . pt chose to engage in familiar task for duration of group. pt was independent with supplies and task. pt worked quietly and methodically on detailed project

## 2023-01-27 PROCEDURE — 250N000013 HC RX MED GY IP 250 OP 250 PS 637: Performed by: PSYCHIATRY & NEUROLOGY

## 2023-01-27 PROCEDURE — 99231 SBSQ HOSP IP/OBS SF/LOW 25: CPT | Performed by: PSYCHIATRY & NEUROLOGY

## 2023-01-27 PROCEDURE — G0177 OPPS/PHP; TRAIN & EDUC SERV: HCPCS

## 2023-01-27 PROCEDURE — 124N000003 HC R&B MH SENIOR/ADOLESCENT

## 2023-01-27 PROCEDURE — H2032 ACTIVITY THERAPY, PER 15 MIN: HCPCS

## 2023-01-27 RX ORDER — OLANZAPINE 10 MG/1
20 TABLET ORAL AT BEDTIME
Status: DISCONTINUED | OUTPATIENT
Start: 2023-01-27 | End: 2023-02-07

## 2023-01-27 RX ADMIN — OLANZAPINE 20 MG: 10 TABLET, FILM COATED ORAL at 20:50

## 2023-01-27 ASSESSMENT — ACTIVITIES OF DAILY LIVING (ADL)
ADLS_ACUITY_SCORE: 32
DRESS: INDEPENDENT
ADLS_ACUITY_SCORE: 32
ORAL_HYGIENE: INDEPENDENT
HYGIENE/GROOMING: INDEPENDENT
ADLS_ACUITY_SCORE: 32
ADLS_ACUITY_SCORE: 32
DRESS: INDEPENDENT
HYGIENE/GROOMING: INDEPENDENT
LAUNDRY: WITH SUPERVISION
ADLS_ACUITY_SCORE: 32
LAUNDRY: WITH SUPERVISION
ADLS_ACUITY_SCORE: 32
ORAL_HYGIENE: INDEPENDENT

## 2023-01-27 NOTE — PLAN OF CARE
"Problem: Psychotic Signs/Symptoms  Goal: Optimal Cognitive Function (Psychotic Signs/Symptoms)  Intervention: Support and Promote Cognitive Ability  Recent Flowsheet Documentation  Taken 1/26/2023 1900 by Myranda Vickers RN  Trust Relationship/Rapport:    questions answered    thoughts/feelings acknowledged    emotional support provided    care explained   Goal Outcome Evaluation:  Plan of Care Reviewed With: patient      Pt is VSS. Denied pain and discomfort. Alert and oriented x4. Pt was visible in the milieu watching TV this shift. Withdrawn due to probably language barrier. Pt continues to endorse auditory hallucinations. Pt denied other mental health symptoms. Pt described mood as normal; With full range affect. calm, cooperative, and pleasant. Pt is tolerating diet and ate 100% of his dinner. Medication compliant. Continue with POC.     /71 (BP Location: Right arm, Patient Position: Sitting)   Pulse 110   Temp 97.8  F (36.6  C) (Temporal)   Resp 16   Ht 1.626 m (5' 4\")   Wt 76.6 kg (168 lb 14 oz)   SpO2 95%   BMI 28.99 kg/m        Length of stay: 27    "

## 2023-01-27 NOTE — PLAN OF CARE
01/27/23 1547   Group Therapy Session   Group Attendance attended group session   Time Session Began 1330   Time Session Ended 1450   Total # Attendees 50   Group Type expressive therapy;psychotherapeutic   Group Topic Covered coping skills/lifestyle management;cognitive therapy techniques;self-care activities   Group Session Detail  Topic focused on the identifying progress noted since admission, coping strategies that were helpful in the past, what is helping currently and setting a goal for the day.   Talked about and chose affirmation to focus on for the weekend. Chose some of the coping ideas on the chart in the lounge to use over the weekend and planned to document on the worksheet what was helpful.      Patient Response/Contribution cooperative with task; listened actively   Patient Participation Detail Stated feeling better than on admission, through the . Chose an affirmation to focus on for the weekend. Stated thinking the medications are helping him. No complaints offered.

## 2023-01-27 NOTE — PLAN OF CARE
" 01/27/23 0919   Individualization/Patient Specific Goals   Patient Personal Strengths Cooperative with treatment   Patient Vulnerabilities Homeless. Multiple histories of psychosis, confusion. Has no social or community support. Language barriers   Anxieties, Fears or Concerns Worried that \"the voices would not go away.\" Worries about housing. Worries about having no support system   Interprofessional Rounds   Summary Pt was admitted due to concerns for his safety. 52 male who has been staying in shelters. He has at least one prior mental health admission and was admitted to [facility] in June of 2021 for similar presentation. He has more than one medical record due to inconsistent spelling of his last name ( Mackenzie vs Ordoñez) and confusion over whether Avalos is middle name or last name.    Pt will be engaged in the therapeutic milieu and groups where he will learn and practice positive coping skills to cope with his symptoms. Will be stabilized via medications and adjunctive interventions including psychotherapy, OT...    Currently, pt is being stabilized. He has been referred to [facility] after getting emergency MA.      Participants    CTC;nursing; psychiatrist   Behavioral Team Discussion   Participants       Dr. Paris Mccauley MD;Nola Swartz, LISSY  ; GAY Gutierres;Juliana Chopra, OT  Plan:      Consults: Hospitalist will be consulted if medical issues arise        Ongoing education given regarding diagnostic and treatment options with risks, benefits and alternatives and adequate verbalization of understanding.     Risk Assessment: [facility] RISK ASSESSMENT: Patient on precautions      Progress Co-operative with treatment. Stabilization is still ongoing. Remains homeless     Medications:  Scheduled Meds:    OLANZapine  15 mg Oral At Bedtime      Continuous Infusions:  PRN Meds:.acetaminophen, alum & mag hydroxide-simethicone, hydrOXYzine, ibuprofen, OLANZapine **OR** OLANZapine, senna-docusate, " traZODone     Medication adherence issues: MS Med Adherence Y/N: Yes, Hospitalization  Medication side effects: MEDICATION SIDE EFFECTS: no side effects reported  Benefit: Yes / No: Yes

## 2023-01-27 NOTE — PLAN OF CARE
"Goal Outcome Evaluation:    Plan of Care Reviewed With: patient        Juan was up ad jessica, attended groups. Writer met with Pt and interacted with him using a . Juan reported feeling a little \"down\" today. Juan continues to report hearing voices which he characterizes as making him feel \"uncomfortable\". Pt denied having suicidal ideation or self harm thoughts. Juan said he feels safe here.     Pt's food and fluid intake were WNL. Juan was pleasant on approach, cooperative. Pt did ask writer via  \"do you know when I can leave\"  Writer reassured Juan that his MD and CTC were working on his discharge and referred him to speak with them with respect to timing of discharge. Juan said he feels much better than when he came in to the hospital and would like to go back to where he was staying prior to admission.            "

## 2023-01-27 NOTE — PROGRESS NOTES
"PSYCHIATRY  PROGRESS NOTE     DATE OF SERVICE   01/27/2023       CHIEF COMPLAINT   \" I am doing okay\".     SUBJECTIVE   Nursing reports:  Plan of Care Reviewed With: patient       Pt is VSS. Denied pain and discomfort. Alert and oriented x4. Pt was visible in the milieu watching TV this shift. Withdrawn due to probably language barrier. Pt continues to endorse auditory hallucinations. Pt denied other mental health symptoms. Pt described mood as normal; With full range affect. calm, cooperative, and pleasant. Pt is tolerating diet and ate 100% of his dinner. Medication compliant. Continue with POC.      /71 (BP Location: Right arm, Patient Position: Sitting)   Pulse 110   Temp 97.8  F (36.6  C) (Temporal)   Resp 16   Ht 1.626 m (5' 4\")   Wt 76.6 kg (168 lb 14 oz)   SpO2 95%   BMI 28.99 kg/m          Patient has been discussed with the  earlier today.  At this time we have not received any updates on the medical assistance application.     OBJECTIVE   Patient was seen and evaluated in the day area by himself, this was a face-to-face evaluation.  Interview was conducted in [place] by this writer.  Patient reported that he is doing good at this time and denies any safety concerns.  He has continued to verbalize having auditory hallucinations that come and go.  Today I talked to the patient about increasing the dose of Zyprexa and he was in agreement with this.  I have educated the patient about the reasons for prescribing Zyprexa benefits, risk and side effects.  I did advise the patient that if he feels dizzy or too tired during the day to let me know and then I can decrease the dose back again to 15 mg at bedtime.  At the time patient verbalized good understanding and he was in agreement with the plan.    No lab results found in last 7 days.       MEDICATIONS   Medications:  Scheduled Meds:    OLANZapine  20 mg Oral At Bedtime     Continuous Infusions:  PRN Meds:.acetaminophen, alum & mag " Problem: Patient Care Overview  Goal: Plan of Care Review  Outcome: Ongoing (interventions implemented as appropriate)  Infant remains on 1.5 L PM NC. FiO2 21%. No episodes of apnea or bradycardia. Infant tolerating feeds of EBM 20kcal/oz. Voiding and stooling. No contact from family this shift. Will continue to monitor.        "hydroxide-simethicone, hydrOXYzine, ibuprofen, OLANZapine **OR** OLANZapine, senna-docusate, traZODone    Medication adherence issues: MS Med Adherence Y/N: Yes, Hospitalization  Medication side effects: MEDICATION SIDE EFFECTS: no side effects reported  Benefit: Yes / No: Yes       ROS   A comprehensive review of systems was negative.       MENTAL STATUS EXAM   Vitals: /71 (BP Location: Right arm, Patient Position: Sitting)   Pulse 110   Temp 97.4  F (36.3  C) (Temporal)   Resp 16   Ht 1.626 m (5' 4\")   Wt 76.6 kg (168 lb 14 oz)   SpO2 97%   BMI 28.99 kg/m      Same as last visit (mental status examination has not changed for the last week)  Appearance:  No apparent distress  Mood:  {Mood: Normal  Affect: full range  was congruent to speech  Suicidal Ideation: PRESENT / ABSENT: absent   Homicidal Ideation: PRESENT / ABSENT: absent   Thought process: [place]   Thought content: Auditory hallucinations that come and go  Fund of Knowledge: Below average  Attention/Concentration: Fair  Language ability:  Intact  Memory:  Immediate recall impaired, Short-term memory impaired and Long-term memory intact  Insight:  fair.  Judgement: fair  Orientation: Yes, x4  Psychomotor Behavior: normal or unremarkable    Muscle Strength and Tone: MuscleStrength: Normal  Gait and Station: Normal       LABS   personally reviewed.   No results found for this or any previous visit (from the past 24 hour(s)).  No lab results found in last 7 days.  No results found for: PHENYTOIN, PHENOBARB, VALPROATE, CBMZ       DIAGNOSIS   Principal Problem:    Schizophrenia, schizoaffective, chronic with acute exacerbation (H)    Active Problem List:  Patient Active Problem List   Diagnosis     Schizophrenia, schizoaffective, chronic with acute exacerbation (H)          PLAN   1. Ongoing education given regarding diagnostic and treatment options with risks, benefits and alternatives and adequate verbalization of understanding.  2.  " Medications      Increase Zyprexa to 20 mg at bedtime    3.  Medical team to follow the patient as needed.  4.   coordinating a safe discharge plan with the patient.      Risk Assessment: [facility] RISK ASSESSMENT: Patient able to contract for safety    Coordination of Care:   Treatment Plan reviewed and physician signed, Care discussed with Care/Treatment Team Members, Chart reviewed and Patient seen      Re-Certification I certify that the inpatient psychiatric facility services furnished since the previous certification were, and continue to be, medically necessary for, either, treatment which could reasonably be expected to improve the patient s condition or diagnostic study and that the hospital records indicate that the services furnished were, either, intensive treatment services, admission and related services necessary for diagnostic study, or equivalent services.     I certify that the patient continues to need, on a daily basis, active treatment furnished directly by or requiring the supervision of inpatient psychiatric facility personnel.   I estimate 14 days of hospitalization is necessary for proper treatment of the patient. My plans for post-hospital care for this patient are  TBD     Paris Mccauley MD    -     01/27/2023  -     2:35 PM    Total time  25 minutes with > 50%spent on coordination of cares and psycho-education.    This note was created with help of Dragon dictation system. Grammatical / typing errors are not intentional.    Paris Mccauley MD

## 2023-01-27 NOTE — PLAN OF CARE
01/27/23 1539   Group Therapy Session   Group Attendance attended group session   Time Session Began 1015   Time Session Ended 1100   Total Time (minutes) 45   Total # Attendees 2   Group Type expressive therapy;psychotherapeutic;task skill   Group Topic Covered problem-solving;cognitive activities;balanced lifestyle;structured socialization   Group Session Detail Occupational Therapy Clinic group to facilitate coping skill exploration, use of cognitive skills and problem solving, creative expression, clinical observation and facilitation of social, cognitive, and kinesthetic performance skills.    Patient Response/Contribution cooperative with task;listened actively;expressed understanding of topic   Patient Participation Detail Worked at a steady pace. Answered questions through the . Pleasant.

## 2023-01-27 NOTE — PLAN OF CARE
Problem: Sleep Disturbance  Goal: Adequate Sleep/Rest  Outcome: Progressing   Goal Outcome Evaluation:    Patient slept comfortably for a total of 7.25 hours. No behavioral issues raised the whole shift.

## 2023-01-27 NOTE — PLAN OF CARE
01/27/23 1540   Group Therapy Session   Group Attendance attended group session   Time Session Began 1100   Time Session Ended 1150   Total Time (minutes) 50   Total # Attendees 2   Group Type psychotherapeutic   Group Topic Covered problem-solving;cognitive activities;balanced lifestyle;cognitive therapy techniques   Group Session Detail Activity using visuospatial scanning in problem solving.   Patient Response/Contribution cooperative with task;listened actively   Patient Participation Detail With practice, action was quicker. Accurate with identifying answers.  was present.

## 2023-01-28 PROCEDURE — 124N000003 HC R&B MH SENIOR/ADOLESCENT

## 2023-01-28 PROCEDURE — 250N000013 HC RX MED GY IP 250 OP 250 PS 637: Performed by: PSYCHIATRY & NEUROLOGY

## 2023-01-28 RX ADMIN — OLANZAPINE 20 MG: 10 TABLET, FILM COATED ORAL at 20:49

## 2023-01-28 ASSESSMENT — ACTIVITIES OF DAILY LIVING (ADL)
ADLS_ACUITY_SCORE: 32
DRESS: INDEPENDENT
ADLS_ACUITY_SCORE: 32
HYGIENE/GROOMING: INDEPENDENT
ADLS_ACUITY_SCORE: 32
ORAL_HYGIENE: INDEPENDENT
ADLS_ACUITY_SCORE: 32
LAUNDRY: WITH SUPERVISION
ADLS_ACUITY_SCORE: 32

## 2023-01-28 NOTE — PLAN OF CARE
"  Problem: Psychotic Signs/Symptoms  Goal: Improved Behavioral Control (Psychotic Signs/Symptoms)  Outcome: Progressing  Flowsheets (Taken 1/27/2023 2155)  Mutually Determined Action Steps (Improved Behavioral Control):   verbalizes gratifying activity   identifies future-oriented goal     Problem: Psychotic Signs/Symptoms  Goal: Optimal Cognitive Function (Psychotic Signs/Symptoms)  Intervention: Support and Promote Cognitive Ability  Recent Flowsheet Documentation  Taken 1/27/2023 2035 by Mallory Colon RN  Trust Relationship/Rapport:   questions encouraged   emotional support provided   empathic listening provided   reassurance provided     Problem: Depression  Goal: Improved Mood  Outcome: Progressing   Goal Outcome Evaluation:    Plan of Care Reviewed With: patient          Patient calm, cooperative and pleasant upon approach. Mood sad/depressed, affect consistent with his mood. Speech spontaneous, clear, coherent, logical, minimal. Patient verbalized feeling \"fine\", denied anxiety and depression. Denied homicidal or suicidal thoughts. Reported chronic auditory hallucinations being at baseline.   Visible in the milieu majority of the shift. Attended group therapy. Seen working on word search or watching TV.   Described sleep and appetite as \"good\".   RN check in was performed with the help of  throughout the facility approved  services line.          "

## 2023-01-28 NOTE — PLAN OF CARE
Problem: Sleep Disturbance  Goal: Adequate Sleep/Rest  Outcome: Progressing   Goal Outcome Evaluation:    Patient slept for a total of 10.5 hours without any interruptions. No behavioral issues noted.

## 2023-01-28 NOTE — PROGRESS NOTES
01/27/23 2000   Group Therapy Session   Time Session Began 1900   Time Session Ended 2000   Total Time (minutes) 55   Total # Attendees 4   Group Type expressive therapy   Group Topic Covered community integration;balanced lifestyle;relaxation techniques   Group Session Detail Relaxation   Patient Response/Contribution cooperative with task   Patient Participation Detail Cooperatively engaged in Evening Music Relaxation group to decrease anxiety and promote sleep.  No  was present today, so the communication barrier did exist and made communication difficult.  Juan did maintain a calm affect and was able to participate, though not as completely as when he had interpretation.

## 2023-01-28 NOTE — PLAN OF CARE
Problem: Depression  Goal: Improved Mood  Outcome: Progressing     Problem: Sleep Disturbance  Goal: Adequate Sleep/Rest  Outcome: Progressing   Goal Outcome Evaluation:    Plan of Care Reviewed With: patient        Patient remained calm, cooperative and pleasant upon approach.   Affect flat, mood depressed/ sad.   Isolative and withdrawn. Visible in the milieu during meal time. Attended group activities.   Appetite and sleep are good.   AH at baseline.

## 2023-01-29 PROCEDURE — 250N000013 HC RX MED GY IP 250 OP 250 PS 637: Performed by: PSYCHIATRY & NEUROLOGY

## 2023-01-29 PROCEDURE — 124N000003 HC R&B MH SENIOR/ADOLESCENT

## 2023-01-29 RX ADMIN — OLANZAPINE 20 MG: 10 TABLET, FILM COATED ORAL at 20:13

## 2023-01-29 ASSESSMENT — ACTIVITIES OF DAILY LIVING (ADL)
ADLS_ACUITY_SCORE: 32
HYGIENE/GROOMING: INDEPENDENT
DRESS: INDEPENDENT;SCRUBS (BEHAVIORAL HEALTH)
ORAL_HYGIENE: INDEPENDENT
HYGIENE/GROOMING: INDEPENDENT
ADLS_ACUITY_SCORE: 32
DRESS: INDEPENDENT
ADLS_ACUITY_SCORE: 32
LAUNDRY: UNABLE TO COMPLETE
ADLS_ACUITY_SCORE: 32
LAUNDRY: WITH SUPERVISION
ADLS_ACUITY_SCORE: 32
ADLS_ACUITY_SCORE: 32
ORAL_HYGIENE: INDEPENDENT

## 2023-01-29 NOTE — PLAN OF CARE
Goal Outcome Evaluation:    Patient slept a total of 11 hours without any interruptions. No behavioral issues raised the whole shift.

## 2023-01-29 NOTE — PLAN OF CARE
"Problem: Adult Behavioral Health Plan of Care  Goal: Plan of Care Review  Outcome: Adequate for Care Transition  Flowsheets (Taken 1/28/2023 2100)  Patient Agreement with Plan of Care: agrees   Goal Outcome Evaluation:  Plan of Care Reviewed With: patient      Patient was visible in the milieu watching TV. Denied pain and discomfort. A&Ox4. VSS. Pt denied all mental health symptoms except-continues to endorse auditory hallucination. Patient described mood as \"fine\" and affect was flat. Patient is cooperative with cares. Patient is med-compliant, and ate 100% of his dinner. Reports \"sleeps through the night\". Pt attends groups and pt is waiting for placement. Patient evaluation continues.       VS reviewed: /58 (BP Location: Right arm)   Pulse 77   Temp 98.1  F (36.7  C) (Oral)   Resp 17   Ht 1.626 m (5' 4\")   Wt 76.6 kg (168 lb 14 oz)   SpO2 95%   BMI 28.99 kg/m   .       Length of stay: 29                                                                              "

## 2023-01-30 PROCEDURE — 99231 SBSQ HOSP IP/OBS SF/LOW 25: CPT | Performed by: PSYCHIATRY & NEUROLOGY

## 2023-01-30 PROCEDURE — G0177 OPPS/PHP; TRAIN & EDUC SERV: HCPCS

## 2023-01-30 PROCEDURE — 250N000013 HC RX MED GY IP 250 OP 250 PS 637: Performed by: PSYCHIATRY & NEUROLOGY

## 2023-01-30 PROCEDURE — 124N000003 HC R&B MH SENIOR/ADOLESCENT

## 2023-01-30 RX ADMIN — OLANZAPINE 20 MG: 10 TABLET, FILM COATED ORAL at 20:15

## 2023-01-30 ASSESSMENT — ACTIVITIES OF DAILY LIVING (ADL)
ADLS_ACUITY_SCORE: 32
HYGIENE/GROOMING: INDEPENDENT
ADLS_ACUITY_SCORE: 32
ORAL_HYGIENE: INDEPENDENT
ADLS_ACUITY_SCORE: 32
ADLS_ACUITY_SCORE: 32
DRESS: INDEPENDENT
ADLS_ACUITY_SCORE: 32
LAUNDRY: UNABLE TO COMPLETE

## 2023-01-30 NOTE — PLAN OF CARE
01/30/23 1426   Group Therapy Session   Group Attendance attended group session   Time Session Began 1015   Time Session Ended 1105   Total Time (minutes) 50   Total # Attendees 3   Group Type expressive therapy;task skill   Group Topic Covered problem-solving;cognitive activities;structured socialization;balanced lifestyle   Group Session Detail  [facility] group to facilitate coping skill exploration, use of cognitive skills and problem solving, creative expression, clinical observation and facilitation of social, cognitive, and kinesthetic performance skills.    Patient Response/Contribution Cooperative with task;organized   Patient Participation Detail Seamed hesitant to attend group initially on approach. Though did come out of his room with little encouragement. Through the , he participated in an activity using visuospatial problem solving which he made decisions with and accomplished identifying the solutions at a rapid and consistent p[ace. Pleasant on approach. Verbal answers were brief.

## 2023-01-30 NOTE — PLAN OF CARE
Problem: Sleep Disturbance  Goal: Adequate Sleep/Rest  Outcome: Progressing   Goal Outcome Evaluation:    Patient slept a total of 8.5 hours without any interruptions. No complaints presented.

## 2023-01-30 NOTE — PLAN OF CARE
Problem: Psychotic Signs/Symptoms  Goal: Improved Behavioral Control (Psychotic Signs/Symptoms)  Outcome: Progressing     Problem: Depression  Goal: Improved Mood  Outcome: Progressing   Goal Outcome Evaluation:    Plan of Care Reviewed With: patient      Patient spent majority of the shift in his room, napping. He avoids social contact with both staff and peers. There was no  who came this afternoon and evening. Writer not able to to have a 1:1 MH assessment. His appetite is good. He is pleasant and calm. Med compliant. He is waiting for placement.

## 2023-01-30 NOTE — PLAN OF CARE
Problem: Psychotic Signs/Symptoms  Goal: Improved Behavioral Control (Psychotic Signs/Symptoms)  Outcome: Progressing   Goal Outcome Evaluation:    Plan of Care Reviewed With: patient          Patient calm, cooperative and pleasant upon approach.   Denies all mental health symptoms except baseline AH.   Visible in the milieu. Attended group therapy.   Appetite and sleep are good.   Discharge plane: pending funding and placement.

## 2023-01-30 NOTE — PLAN OF CARE
01/30/23 1424   Group Therapy Session   Group Attendance attended group session   Time Session Began 1300   Time Session Ended 1400   Total Time (minutes) 60   Total # Attendees 4   Group Type psychotherapeutic;psychoeducation   Group Topic Covered coping skills/lifestyle management;cognitive therapy techniques;structured socialization;balanced lifestyle;self-care activities   Group Session Detail  Activity focused on self esteem builders and perspectives   Patient Response/Contribution cooperative with task;listened actively   Patient Participation Detail Was quicker to attend group than earlier this am.  was present. Offered brief answers except when talking about appreciating the work he used to do. Answers were in context.

## 2023-01-30 NOTE — PLAN OF CARE
01/30/23 1359   Group Therapy Session   Group Attendance attended group session   Time Session Began 1115   Time Session Ended 1200   Total Time (minutes) 45   Total # Attendees 3   Group Type life skill;task skill;recreation   Group Topic Covered balanced lifestyle;cognitive activities;coping skills/lifestyle management;leisure exploration/use of leisure time;structured socialization;self-care activities   Group Session Detail OT Wellness Group-Cognitive and Movement Jenga for social engagement, following directions, focus, turn taking, physical movement, cognition, healthy distraction, symptom management, and insight development   Patient Response/Contribution cooperative with task   Patient Participation Detail pt actively engaged in group activity. pt demonstrated understanding of activity with assist from . pt was able to follow verbal instructions and visual demonstration. pt engaged in 100% of physical movements. pt shared he has enjoyed running in the past and has been using the exercise bike while on the unit. pt shared it physical exercises makes you feel strong physically and mentally.

## 2023-01-31 ENCOUNTER — APPOINTMENT (OUTPATIENT)
Dept: INTERPRETER SERVICES | Facility: CLINIC | Age: 53
End: 2023-01-31
Payer: MEDICAID

## 2023-01-31 ENCOUNTER — OFFICE VISIT (OUTPATIENT)
Dept: INTERPRETER SERVICES | Facility: CLINIC | Age: 53
End: 2023-01-31
Payer: MEDICAID

## 2023-01-31 PROCEDURE — H2032 ACTIVITY THERAPY, PER 15 MIN: HCPCS

## 2023-01-31 PROCEDURE — 250N000013 HC RX MED GY IP 250 OP 250 PS 637: Performed by: PSYCHIATRY & NEUROLOGY

## 2023-01-31 PROCEDURE — 124N000003 HC R&B MH SENIOR/ADOLESCENT

## 2023-01-31 PROCEDURE — 99232 SBSQ HOSP IP/OBS MODERATE 35: CPT | Performed by: PSYCHIATRY & NEUROLOGY

## 2023-01-31 PROCEDURE — G0177 OPPS/PHP; TRAIN & EDUC SERV: HCPCS

## 2023-01-31 PROCEDURE — T1013 SIGN LANG/ORAL INTERPRETER: HCPCS | Mod: U3

## 2023-01-31 RX ADMIN — OLANZAPINE 20 MG: 10 TABLET, FILM COATED ORAL at 20:51

## 2023-01-31 ASSESSMENT — ACTIVITIES OF DAILY LIVING (ADL)
ORAL_HYGIENE: INDEPENDENT
ADLS_ACUITY_SCORE: 32
HYGIENE/GROOMING: INDEPENDENT
DRESS: INDEPENDENT
ADLS_ACUITY_SCORE: 32

## 2023-01-31 NOTE — PLAN OF CARE
Problem: Sleep Disturbance  Goal: Adequate Sleep/Rest  Outcome: Progressing   Goal Outcome Evaluation:             Appeared to be asleep during rounds and noted to have slept for 9.75 hours  No concerns/ complaints raised this shift

## 2023-01-31 NOTE — PROGRESS NOTES
"PSYCHIATRY  PROGRESS NOTE     DATE OF SERVICE   01/30/2023       CHIEF COMPLAINT   \" I am doing okay\".     SUBJECTIVE   Nursing reports:  Patient calm, cooperative and pleasant upon approach.   Denies all mental health symptoms except baseline AH.   Visible in the milieu. Attended group therapy.   Appetite and sleep are good.   Discharge plane: pending funding and placement.      Patient has been discussed with the  earlier today.  At this time we have not received any updates on the medical assistance application.     OBJECTIVE   Patient was seen and evaluated in the day area by himself, this was a face-to-face evaluation.  Interview was conducted in [place] by this writer.  Patient reported that he is doing fine and at this time he denies any issues or concerns. Denies having any side effects from his medications and stated that he is sleeping well. Despite increasing the Zyprexa patient has continue to endorse having AH at the same level as las week. I unclear if this are true hallucinations as the patient is organized, not responding to internal stimuli and has been attending most groups.    No lab results found in last 7 days.       MEDICATIONS   Medications:  Scheduled Meds:    OLANZapine  20 mg Oral At Bedtime     Continuous Infusions:  PRN Meds:.acetaminophen, alum & mag hydroxide-simethicone, hydrOXYzine, ibuprofen, OLANZapine **OR** OLANZapine, senna-docusate, traZODone    Medication adherence issues: MS Med Adherence Y/N: Yes, Hospitalization  Medication side effects: MEDICATION SIDE EFFECTS: no side effects reported  Benefit: Yes / No: Yes       ROS   A comprehensive review of systems was negative.       MENTAL STATUS EXAM   Vitals: /68 (Patient Position: Supine)   Pulse 115   Temp 97.5  F (36.4  C) (Temporal)   Resp 16   Ht 1.626 m (5' 4\")   Wt 77.3 kg (170 lb 6.7 oz)   SpO2 99%   BMI 29.25 kg/m      Same as last visit   Appearance:  No apparent distress  Mood:  {Mood: " Normal  Affect: full range  was congruent to speech  Suicidal Ideation: PRESENT / ABSENT: absent   Homicidal Ideation: PRESENT / ABSENT: absent   Thought process: [place]   Thought content: Auditory hallucinations that come and go  Fund of Knowledge: Below average  Attention/Concentration: Fair  Language ability:  Intact  Memory:  Immediate recall impaired, Short-term memory impaired and Long-term memory intact  Insight:  fair.  Judgement: fair  Orientation: Yes, x4  Psychomotor Behavior: normal or unremarkable    Muscle Strength and Tone: MuscleStrength: Normal  Gait and Station: Normal       LABS   personally reviewed.   No results found for this or any previous visit (from the past 24 hour(s)).  No lab results found in last 7 days.  No results found for: PHENYTOIN, PHENOBARB, VALPROATE, CBMZ       DIAGNOSIS   Principal Problem:    Schizophrenia, schizoaffective, chronic with acute exacerbation (H)    Active Problem List:  Patient Active Problem List   Diagnosis     Schizophrenia, schizoaffective, chronic with acute exacerbation (H)          PLAN   1. Ongoing education given regarding diagnostic and treatment options with risks, benefits and alternatives and adequate verbalization of understanding.  2.  Medications      Zyprexa to 20 mg at bedtime    3.  Medical team to follow the patient as needed.  4.   coordinating a safe discharge plan with the patient.      Risk Assessment: [facility] RISK ASSESSMENT: Patient able to contract for safety    Coordination of Care:   Treatment Plan reviewed and physician signed, Care discussed with Care/Treatment Team Members, Chart reviewed and Patient seen      Re-Certification I certify that the inpatient psychiatric facility services furnished since the previous certification were, and continue to be, medically necessary for, either, treatment which could reasonably be expected to improve the patient s condition or diagnostic study and that the hospital records  indicate that the services furnished were, either, intensive treatment services, admission and related services necessary for diagnostic study, or equivalent services.     I certify that the patient continues to need, on a daily basis, active treatment furnished directly by or requiring the supervision of inpatient psychiatric facility personnel.   I estimate 14 days of hospitalization is necessary for proper treatment of the patient. My plans for post-hospital care for this patient are  TBD     Paris Mccauley MD    -     01/30/2023  -     8:30 PM    Total time  25 minutes with > 50%spent on coordination of cares and psycho-education.    This note was created with help of Dragon dictation system. Grammatical / typing errors are not intentional.    Paris Mccauley MD

## 2023-01-31 NOTE — PLAN OF CARE
Occupational Therapy       01/31/23 1200   Group Therapy Session   Group Attendance attended group session   Time Session Began 1015   Time Session Ended 1115   Total Time (minutes) 60   Total # Attendees 4   Group Type task skill   Group Topic Covered balanced lifestyle;cognitive activities;coping skills/lifestyle management;leisure exploration/use of leisure time;problem-solving;structured socialization   Group Session Detail OT: Education on healthy activity engagement and creative hands on endeavor (OT clinic) to increase concentration, focus, attention to task/detail, decision making, problem solving, frustration tolerance, task follow through, coping with stress, healthy leisure engagement, creative expression, and social engagement    Patient Response/Contribution cooperative with task;other (see comments)  (semi-withdrawn)   Patient Participation Detail Pt independently gathered supplies from a previous session in order to engage in a selected cognitive activity. Pt sat among peers to complete cognitive activity but did not engage in social interactions with peers, despite having an  present for duration of group. Pt appeared semi-withdrawn as his contributions to the large group discussion were brief, which is in contrast to him historically contributing substantial answers to large group discussions.

## 2023-01-31 NOTE — PROGRESS NOTES
"Pt participated in dance/movement therapy (D/MT) using strong organizing rhythms to support clarity of thought and regulation of mood.  Pt used movement to support these therapeutic goals.  In addition, metaphors of \"safe landings\" and \"having no roots\" vs \"digging in\" were explored to reinforce tangible realities and behaviors associated with these images.  Pt was a  in movements from a seated position, then transitioning to standing.  He moved forward and back with identifiable patterns to his steps and increasing coordination with others.Pt used the aid of an  for comprehension of interventions, but only communicated nonverbally.       01/31/23 1115   Expressive Therapy   Therapy Type dance/movement   Minutes of Treatment 50       "

## 2023-01-31 NOTE — PLAN OF CARE
Occupational Therapy     01/31/23 1500   Group Therapy Session   Group Attendance attended group session   Time Session Began 1315   Time Session Ended 1400   Total Time (minutes) 45   Total # Attendees 3   Group Type recreation   Group Topic Covered cognitive activities;leisure exploration/use of leisure time;structured socialization   Group Session Detail OT: Education on healthy leisure engagement and interactive social activity with a cognitive component ([place]) to increase concentration, focus, attention to task/detail, memory recall, coping with stress, healthy distraction engagement, symptom management, healthy leisure engagement, social wellness, and cognitive wellness   Patient Response/Contribution cooperative with task;listened actively   Patient Participation Detail Pt sat among peers to complete presented activity but did not engage in social interactions with peers; pt engaged in brief social interactions with therapist on approach. Pt able to follow verbal directions and visual directions for novel activity. Pt able to independently sequence activity and able to retain details of directions. Pt reported he enjoyed the activity.  present for duration.

## 2023-01-31 NOTE — PLAN OF CARE
"Problem: Psychotic Signs/Symptoms  Goal: Improved Behavioral Control (Psychotic Signs/Symptoms)  Outcome: Adequate for Care Transition   Goal Outcome Evaluation:  Plan of Care Reviewed With: patient      Patient was visible in the milieu watching TV this evening. Pt is isolative and withdrawn; However, attends groups. Pt denied pain and discomfort. Alert and oriented x4. VSS. Pt continues to endorse auditory hallucination. Pt denied other mental health symptoms. Patient described mood as \"fine\" and full range affect. Patient is cooperative with cares. Patient is med-compliant, and ate 100% of his dinner. Reports \"sleeps through the night\". Patient evaluation continues.       VS reviewed: /68 (Patient Position: Supine)   Pulse 115   Temp 97.5  F (36.4  C) (Temporal)   Resp 16   Ht 1.626 m (5' 4\")   Wt 77.3 kg (170 lb 6.7 oz)   SpO2 99%   BMI 29.25 kg/m   .       Length of stay: 31                                                                            "

## 2023-01-31 NOTE — PLAN OF CARE
Problem: Psychotic Signs/Symptoms  Goal: Optimal Cognitive Function (Psychotic Signs/Symptoms)  Outcome: Progressing   Goal Outcome Evaluation:         No changes in patient's condition this shift. Patient is at baseline. Pending placement.

## 2023-01-31 NOTE — PLAN OF CARE
Assessment/Intervention/Current Symtoms and Care Coordination  CTC (writer)  reviewed pt's chart.   -Awaiting on [facility] referral feedback  -Has been referred to [facility]. Two have declined him. Others are reviewing.   Discharge Plan or Goal  Pt is new and is undergoing stabilization. He is currently homeless      Barriers to Discharge   Safe discharge plan, ongoing symptom severity (highly disorganized, and having psychosis in the context of hearing voices), and medication evaluation/assessment.     Referral Status  Emergency Medical Assistance to be able to access resources  Relocation assistance     Legal Status  Voluntary

## 2023-02-01 ENCOUNTER — APPOINTMENT (OUTPATIENT)
Dept: INTERPRETER SERVICES | Facility: CLINIC | Age: 53
End: 2023-02-01
Payer: MEDICAID

## 2023-02-01 PROCEDURE — 250N000013 HC RX MED GY IP 250 OP 250 PS 637: Performed by: PSYCHIATRY & NEUROLOGY

## 2023-02-01 PROCEDURE — H2032 ACTIVITY THERAPY, PER 15 MIN: HCPCS

## 2023-02-01 PROCEDURE — 124N000003 HC R&B MH SENIOR/ADOLESCENT

## 2023-02-01 RX ADMIN — OLANZAPINE 20 MG: 10 TABLET, FILM COATED ORAL at 20:45

## 2023-02-01 ASSESSMENT — ACTIVITIES OF DAILY LIVING (ADL)
ADLS_ACUITY_SCORE: 32
DRESS: INDEPENDENT
HYGIENE/GROOMING: INDEPENDENT
ORAL_HYGIENE: INDEPENDENT
ADLS_ACUITY_SCORE: 32

## 2023-02-01 NOTE — PLAN OF CARE
"  Problem: Psychotic Signs/Symptoms  Goal: Improved Mood Symptoms  Outcome: Progressing   Goal Outcome Evaluation:    Plan of Care Reviewed With: patient      Patient is calm and cooperative, isolative and withdrawn. Denies anxiety, depression, SI,SIB. Report auditory hallucinations are \"Telling me to go, to get out of here\".  Patient then asked writer when would their discharge be, writer informed patient to speak with CTC. Patient also reports the auditory hallucinations use profane language, and it occurs everyday. Patient did not attend morning group stating \"I didn't know there was a group going on\". Good appetite, eating and drinking well. Denied pain or other concerns.  "

## 2023-02-01 NOTE — PLAN OF CARE
Goal Outcome Evaluation:    Plan of Care Reviewed With: patient          Pt calm, pleasant, cooperative. Eye contact and affect WDL. Attending and participating in therapeutic groups. Denies depression, anxiety, suicidal ideation. Reported auditory hallucinations are still present but less intense and do not command him to harm himself or any other person. Med-compliant. Appetite, sleep, hygiene good. Denies physical complaints, pain or side effects. Continue with current treatment plan and recommendations. Continue to monitor and reassess symptoms. Monitor response to medications. Monitor progress towards treatment goals. Encourage groups and participation.

## 2023-02-01 NOTE — PROGRESS NOTES
"PSYCHIATRY  PROGRESS NOTE     DATE OF SERVICE   01/31/2023       CHIEF COMPLAINT   \" I am doing okay\".     SUBJECTIVE   Nursing reports:  Patient remains psychiatrically stable and no new behaviors have been reported.     Patient has been discussed with the  earlier today.   has continued to follow up on referrals for intensive residential treatment facilities.     OBJECTIVE   Patient was seen and evaluated in the day area by himself, this was a face-to-face evaluation.  Interview was conducted in Beninese by this writer.  Patient verbalized that he is feeling much better and at this time he feels that he can be outside the hospital.  When I asked the patient how is he going to provide himself with shelter or food the patient did not had a clear answer and stated that his plan is just to go out on the streets.  I discussed with the patient that it will be better if he stays here for a few more days in order for the  to be able to solidify a safe discharge plan.  This week the temperatures are going to be below 0 and I do not think it will be safe for him to be in the streets with no shelter or food.  In addition to all of this patient does not have the appropriate clothing for the weather. Patient did verbalize good understanding and he is in agreement with staying here at least until next Monday.    Patient has continued to report having auditory hallucinations that seem to be at the baseline right now.    No lab results found in last 7 days.       MEDICATIONS   Medications:  Scheduled Meds:    OLANZapine  20 mg Oral At Bedtime     Continuous Infusions:  PRN Meds:.acetaminophen, alum & mag hydroxide-simethicone, hydrOXYzine, ibuprofen, OLANZapine **OR** OLANZapine, senna-docusate, traZODone    Medication adherence issues: MS Med Adherence Y/N: Yes, Hospitalization  Medication side effects: MEDICATION SIDE EFFECTS: no side effects reported  Benefit: Yes / No: Yes       ROS   A " "comprehensive review of systems was negative.       MENTAL STATUS EXAM   Vitals: /74   Pulse 98   Temp 98  F (36.7  C) (Oral)   Resp 16   Ht 1.626 m (5' 4\")   Wt 77.5 kg (170 lb 13.7 oz)   SpO2 96%   BMI 29.33 kg/m        Appearance:  No apparent distress  Mood: \"I am feeling much better\"  Affect: full range  was congruent to speech  Suicidal Ideation: PRESENT / ABSENT: absent   Homicidal Ideation: PRESENT / ABSENT: absent   Thought process: [place]   Thought content: Auditory hallucinations that come and go, seem to be at his baseline.  Fund of Knowledge: Below average  Attention/Concentration: Fair  Language ability:  Intact  Memory:  Immediate recall impaired, Short-term memory impaired and Long-term memory intact  Insight:  fair.  Judgement: fair  Orientation: Yes, x4  Psychomotor Behavior: normal or unremarkable    Muscle Strength and Tone: MuscleStrength: Normal  Gait and Station: Normal       LABS   personally reviewed.   No results found for this or any previous visit (from the past 24 hour(s)).  No lab results found in last 7 days.  No results found for: PHENYTOIN, PHENOBARB, VALPROATE, CBMZ       DIAGNOSIS   Principal Problem:    Schizophrenia, schizoaffective, chronic with acute exacerbation (H)    Active Problem List:  Patient Active Problem List   Diagnosis     Schizophrenia, schizoaffective, chronic with acute exacerbation (H)          PLAN   1. Ongoing education given regarding diagnostic and treatment options with risks, benefits and alternatives and adequate verbalization of understanding.  2.  Medications      Zyprexa to 20 mg at bedtime    3.  Medical team to follow the patient as needed.  4.   coordinating a safe discharge plan with the patient.      Risk Assessment: [facility] RISK ASSESSMENT: Patient able to contract for safety    Coordination of Care:   Treatment Plan reviewed and physician signed, Care discussed with Care/Treatment Team Members, Chart reviewed and " Patient seen      Re-Certification I certify that the inpatient psychiatric facility services furnished since the previous certification were, and continue to be, medically necessary for, either, treatment which could reasonably be expected to improve the patient s condition or diagnostic study and that the hospital records indicate that the services furnished were, either, intensive treatment services, admission and related services necessary for diagnostic study, or equivalent services.     I certify that the patient continues to need, on a daily basis, active treatment furnished directly by or requiring the supervision of inpatient psychiatric facility personnel.   I estimate 14 days of hospitalization is necessary for proper treatment of the patient. My plans for post-hospital care for this patient are  TBD     Paris Mccauley MD    -     01/31/2023  -     8:45 PM    Total time  35 minutes with > 50%spent on coordination of cares and psycho-education.    This note was created with help of Dragon dictation system. Grammatical / typing errors are not intentional.    Paris Mccauley MD

## 2023-02-01 NOTE — PLAN OF CARE
02/01/23 1522   Group Therapy Session   Group Attendance attended group session   Time Session Began 1300   Time Session Ended 1400   Total Time (minutes) 30  (no charge)   Total # Attendees 3   Group Type life skill;task skill;recreation   Group Topic Covered cognitive activities;leisure exploration/use of leisure time;relaxation techniques;problem-solving   Group Session Detail OT Creative Expressions group-Watercolor painting for healthy leisure exploration, relaxation, coping skill building, following directions, symptom management, creativity, and healthy distraction   Patient Response/Contribution cooperative with task   Patient Participation Detail pt arrived late for group with  present. pt chose a small butterfly design for his project. pt was independent with supplies and task following initial verbal instructions. pt worked quietly and meticulously through completion. pt provided short, one word answers to questions from therapist or peers-at times  had to repeat question prior to a response.

## 2023-02-01 NOTE — PROGRESS NOTES
Patient evaluated today.  Continues to remain stable and no new behaviors have been reported.  Patient denies any concerns and continues to contract for safety.    Paris Mccauley MD

## 2023-02-01 NOTE — PLAN OF CARE
Assessment/Intervention/Current Symtoms and Care Coordination  CTC (writer)  reviewed pt's chart.   -Awaiting on [facility] referral feedback  -Has been referred to [facility]. Two have declined him. Others are reviewing.   -Making additional [facility] referrals. Has also reached out for places that may have [place]ian  support for programing.  Discharge Plan or Goal  Pt is undergoing stabilization. He is currently homeless      Barriers to Discharge   Safe discharge plan, ongoing symptom severity (highly disorganized, and having psychosis in the context of hearing voices), and medication evaluation/assessment.     Referral Status  Emergency Medical Assistance to be able to access resources  Relocation assistance     Legal Status  Voluntary

## 2023-02-01 NOTE — PLAN OF CARE
Problem: Sleep Disturbance  Goal: Adequate Sleep/Rest  Outcome: Progressing   Goal Outcome Evaluation:                      Slept uninterrupted for 8.75.  No complaints/ concerns raised tonight.

## 2023-02-02 ENCOUNTER — APPOINTMENT (OUTPATIENT)
Dept: INTERPRETER SERVICES | Facility: CLINIC | Age: 53
End: 2023-02-02
Payer: MEDICAID

## 2023-02-02 PROCEDURE — 250N000013 HC RX MED GY IP 250 OP 250 PS 637: Performed by: PSYCHIATRY & NEUROLOGY

## 2023-02-02 PROCEDURE — 124N000003 HC R&B MH SENIOR/ADOLESCENT

## 2023-02-02 PROCEDURE — 99232 SBSQ HOSP IP/OBS MODERATE 35: CPT | Performed by: PSYCHIATRY & NEUROLOGY

## 2023-02-02 PROCEDURE — G0177 OPPS/PHP; TRAIN & EDUC SERV: HCPCS

## 2023-02-02 RX ADMIN — OLANZAPINE 20 MG: 10 TABLET, FILM COATED ORAL at 20:37

## 2023-02-02 ASSESSMENT — ACTIVITIES OF DAILY LIVING (ADL)
DRESS: INDEPENDENT
DRESS: INDEPENDENT;SCRUBS (BEHAVIORAL HEALTH)
ORAL_HYGIENE: INDEPENDENT
ORAL_HYGIENE: INDEPENDENT
ADLS_ACUITY_SCORE: 32
ADLS_ACUITY_SCORE: 32
HYGIENE/GROOMING: INDEPENDENT
ADLS_ACUITY_SCORE: 32
LAUNDRY: UNABLE TO COMPLETE
ADLS_ACUITY_SCORE: 32
HYGIENE/GROOMING: INDEPENDENT
ADLS_ACUITY_SCORE: 32
LAUNDRY: UNABLE TO COMPLETE
ADLS_ACUITY_SCORE: 32

## 2023-02-02 NOTE — PLAN OF CARE
02/02/23 1228   Group Therapy Session   Group Attendance attended group session   Time Session Began 1015   Time Session Ended 1115   Total Time (minutes) 60   Total # Attendees 5   Group Type expressive therapy;task skill   Group Topic Covered coping skills/lifestyle management;problem-solving;cognitive activities;balanced lifestyle   Group Session Detail [facility] group to facilitate coping skill exploration, use of cognitive skills and problem solving, creative expression, clinical observation and facilitation of social, cognitive, and kinesthetic performance skills.    Patient Response/Contribution cooperative with task   Patient Participation Detail Worked quietly. Offered brief answers through the . Made choices and worked independently on a familiar highly detailed 1 step task. Affect appeared serious.

## 2023-02-02 NOTE — PLAN OF CARE
Problem: Psychotic Signs/Symptoms  Goal: Improved Sleep (Psychotic Signs/Symptoms)  Outcome: Progressing   Goal Outcome Evaluation:                    Appeared to be sleeping in bed comfortably for 8.75 hours tonight.  No concerns raised.

## 2023-02-02 NOTE — PLAN OF CARE
02/02/23 1547   Group Therapy Session   Group Attendance attended group session   Time Session Began 1300   Time Session Ended 1345   Total Time (minutes) 45   Total # Attendees 4   Group Type psychoeducation   Group Topic Covered coping skills/lifestyle management;problem-solving;cognitive therapy techniques;structured socialization;balanced lifestyle   Group Session Detail  Topic of Conflict Resolution   Patient Response/Contribution cooperative with task   Patient Participation Detail Through the , offered brief answers with no elaboration. Needed q repeated on occasion to elicit a verbal response.

## 2023-02-02 NOTE — PLAN OF CARE
Assessment/Intervention/Current Symtoms and Care Coordination  CTC (writer)  reviewed pt's chart.   -Awaiting on [facility] referral feedback  -Has been referred to [facility]. Two have declined him. Others are reviewing.   -Making additional [facility] referrals. Has also reached out for places that may have [place]  support for programing.  -Met with the pt with an . He reported wanting to be discharged this week. Writer asked where he will be going, and he responded that he does not know. Writer encouraged the patient to think about where he will be going and what he will be needing, given that he has been referred to [facility], and his clinical documents are still being reviewed. Pt reported that he still wants to be discharged this week. Plan is to meet with the pt tomorrow with the doctor.  Discharge Plan or Goal  Pt is undergoing stabilization. He is currently homeless      Barriers to Discharge   Safe discharge plan, ongoing symptom severity (highly disorganized, and having psychosis in the context of hearing voices), and medication evaluation/assessment.     Referral Status  Emergency Medical Assistance to be able to access resources  Relocation assistance     Legal Status  Voluntary

## 2023-02-02 NOTE — PROGRESS NOTES
Behavioral Health  Note    Behavioral Health  Spirituality Group Note    UNIT [address]    Name:    Juan Ordoñez                                                                     YOB: 1970    MRN:     [id number]                                                                       Age: 52 year old      Patient attended -led group, which included discussion of spirituality, coping with illness and building resilience.    Patient attended group for [facility] - spirituality groups are not billed.    The patient actively participated in group discussion      Thomas Minor, PhD  Associate

## 2023-02-02 NOTE — PROGRESS NOTES
02/01/23 1900   Group Therapy Session   Group Attendance attended group session   Time Session Began 1900   Time Session Ended 1945   Total Time (minutes) 45   Total # Attendees 4   Group Type recreation   Group Topic Covered relaxation techniques   Group Session Detail stress reduction   Patient Response/Contribution cooperative with task   Patient Participation Detail Pt actively participated in a structured Therapeutic Recreation group with a focus on leisure participation, socializing, and exercise. Pt participated in the guided exercise for the full duration of the group. Pt followed along, engaged in the guided chair exercise routine and added to the discussion prompts throughout the routine.  Pt was encouraged to use positive imagery with the deep breathing and stretching to foster relaxation, improves focus, and reduce stress.

## 2023-02-02 NOTE — PLAN OF CARE
Problem: Psychotic Signs/Symptoms  Goal: Improved Mood Symptoms  Outcome: Progressing   Goal Outcome Evaluation:    Plan of Care Reviewed With: patient      Patient is calm and cooperative. Visible for meals and attends groups, patient is withdrawn to self. Denies anxiety/depression/SI,HI/VH. Endorses AH, reports voices do not command patient to harm self or others. Per report, AH are diminished from admission. Denies pain or other concerns.

## 2023-02-03 ENCOUNTER — OFFICE VISIT (OUTPATIENT)
Dept: INTERPRETER SERVICES | Facility: CLINIC | Age: 53
End: 2023-02-03
Payer: MEDICAID

## 2023-02-03 PROCEDURE — 99231 SBSQ HOSP IP/OBS SF/LOW 25: CPT | Performed by: PSYCHIATRY & NEUROLOGY

## 2023-02-03 PROCEDURE — 124N000003 HC R&B MH SENIOR/ADOLESCENT

## 2023-02-03 PROCEDURE — T1013 SIGN LANG/ORAL INTERPRETER: HCPCS | Mod: U3

## 2023-02-03 PROCEDURE — 250N000013 HC RX MED GY IP 250 OP 250 PS 637: Performed by: PSYCHIATRY & NEUROLOGY

## 2023-02-03 PROCEDURE — G0177 OPPS/PHP; TRAIN & EDUC SERV: HCPCS

## 2023-02-03 PROCEDURE — H2032 ACTIVITY THERAPY, PER 15 MIN: HCPCS

## 2023-02-03 RX ADMIN — OLANZAPINE 20 MG: 10 TABLET, FILM COATED ORAL at 21:19

## 2023-02-03 ASSESSMENT — ACTIVITIES OF DAILY LIVING (ADL)
ADLS_ACUITY_SCORE: 32
DRESS: INDEPENDENT
ORAL_HYGIENE: INDEPENDENT
HYGIENE/GROOMING: INDEPENDENT
ADLS_ACUITY_SCORE: 32
ADLS_ACUITY_SCORE: 32
HYGIENE/GROOMING: INDEPENDENT
ADLS_ACUITY_SCORE: 32
ORAL_HYGIENE: INDEPENDENT
ADLS_ACUITY_SCORE: 32
DRESS: INDEPENDENT;SCRUBS (BEHAVIORAL HEALTH)

## 2023-02-03 NOTE — PLAN OF CARE
02/03/23 1444   Group Therapy Session   Group Attendance attended group session   Time Session Began 1315   Time Session Ended 1400   Total Time (minutes) 45   Group Type life skill;task skill   Group Topic Covered balanced lifestyle;cognitive activities;structured socialization;problem-solving;coping skills/lifestyle management   Group Session Detail OT Topic Group-Week In Review for insight development, social engagement, problem solving, discharge planning, focus, following directions, coping skill building, goal setting, and cognition   Patient Response/Contribution able to recall/repeat info presented;cooperative with task   Patient Participation Detail pt actively engaged in group activity with assist of  to translate handout. pt shared enjoyment of group activties. pt shared therapies have been helping him during his hospitalization and his goal is to attend all groups. pt shared enjoyment of new card game from previous days group. pt was provided with activity template to keep over the weekend.

## 2023-02-03 NOTE — PLAN OF CARE
Problem: Adult Behavioral Health Plan of Care  Goal: Adheres to Safety Considerations for Self and Others  Intervention: Develop and Maintain Individualized Safety Plan  Recent Flowsheet Documentation  Taken 2/3/2023 1516 by Nola Swartz RN  Safety Measures:   environmental rounds completed   suicide assessment completed     Problem: Psychotic Signs/Symptoms  Goal: Improved Psychomotor Symptoms (Psychotic Signs/Symptoms)  Intervention: Manage Psychomotor Movement  Recent Flowsheet Documentation  Taken 2/3/2023 1516 by Nola Swartz RN  Patient Performed Hygiene: dressed  Activity (Behavioral Health): up ad jessica     Problem: Psychotic Signs/Symptoms  Goal: Improved Sleep (Psychotic Signs/Symptoms)  Outcome: Progressing  Flowsheets (Taken 2/3/2023 1524)  Mutually Determined Action Steps (Improved Sleep): sleeps 4-6 hours at night     Problem: Depression  Goal: Improved Mood  Outcome: Progressing   Goal Outcome Evaluation:    Plan of Care Reviewed With: patient      Pt has been pleasant and cooperative. Writer met with patient and interpretor. Pt denies depression/anxiety/SI/SIB/wishes to be dead. Pt reports continued auditory hallucinations.   Pt has attended groups with prompts and when interpretor was present.  Pt denies pain or issues with sleep and has been eating well at meal time.  Pt's affect brighten's during interactions with appropriate eye contact.

## 2023-02-03 NOTE — PLAN OF CARE
Assessment/Intervention/Current Symtoms and Care Coordination  CTC (writer)  reviewed pt's chart.   -Awaiting on [facility] referral feedback  -Has been referred to [facility]. Two have declined him. [facility] is reviewing. Sent a message requesting an update. Have yet to get a response.      Discharge Plan or Goal  Pt is undergoing stabilization. He is currently homeless      Barriers to Discharge   Safe discharge plan, ongoing symptom severity (highly disorganized, and having psychosis in the context of hearing voices), and medication evaluation/assessment.     Referral Status  Emergency Medical Assistance to be able to access resources  Relocation assistance     Legal Status  Voluntary

## 2023-02-03 NOTE — PROGRESS NOTES
"PSYCHIATRY  PROGRESS NOTE     DATE OF SERVICE   02/02/2023   The patient is a 52 year old male who is being evaluated via a video billable telemedicine visit. The patient/guardian has consented to being seen via telemedicine. The provider was in front of a computer in a home office. The patient was on the inpatient unit at [facility].    Start time: 10:35 AM  Stop time: 10:42 AM    The patient/guardian has been notified of the following:     This telemedicine visit is conducted live between you and your clinician. We have found that certain health care needs can be provided without the need for a physical exam. This service lets us provide the care you need with a telemedicine conversation.           CHIEF COMPLAINT   \" I am doing fine\".     SUBJECTIVE   Nursing reports:  Patient is calm and cooperative. Visible for meals and attends groups, patient is withdrawn to self. Denies anxiety/depression/SI,HI/VH. Endorses AH, reports voices do not command patient to harm self or others. Per report, AH are diminished from admission. Denies pain or other concerns.     Patient has been discussed with the  earlier today.    Assessment/Intervention/Current Symtoms and Care Coordination  CTC (writer)  reviewed pt's chart.   -Awaiting on [facility] referral feedback  -Has been referred to [facility]. Two have declined him. Others are reviewing.   -Making additional [facility] referrals. Has also reached out for places that may have [place]  support for programing.  -Met with the pt with an . He reported wanting to be discharged this week. Writer asked where he will be going, and he responded that he does not know. Writer encouraged the patient to think about where he will be going and what he will be needing, given that he has been referred to [facility], and his clinical documents are still being reviewed. Pt reported that he still wants to be discharged this week. Plan is to meet with the pt tomorrow with " the doctor.     OBJECTIVE   Patient was seen and evaluated in the consult room with medical student present during the assessment, this was done with the use of telehealth.  Interview was conducted in Turkish by this provider.  Patient reported that he is doing fine and at this time is denying most psychiatric symptoms.  Patient reported that he has continued to experience auditory hallucinations but the patient said that they are mostly inner voices.    I did reviewed with the patient the tentative discharge plans and the referrals that the  has made.  It seems that [facility] are looking into his case and they might have a bed available for him soon.   was also able to communicate with the [employer] and they will be communicating with him next week.  After discussing with the patient different alternatives patient stated that he will prefer to stay in the [place] and fix his papers.  He is open to have the embassy helping him communicate with his family back in [place].  He did ask about leaving the hospital and I did encourage the patient to stay here at least over the weekend in order for us to make sure he has a safe discharge plan.  At the time patient verbalized good understanding and was in agreement with this.    No lab results found in last 7 days.       MEDICATIONS   Medications:  Scheduled Meds:    OLANZapine  20 mg Oral At Bedtime     Continuous Infusions:  PRN Meds:.acetaminophen, alum & mag hydroxide-simethicone, hydrOXYzine, ibuprofen, OLANZapine **OR** OLANZapine, senna-docusate, traZODone    Medication adherence issues: MS Med Adherence Y/N: Yes, Hospitalization  Medication side effects: MEDICATION SIDE EFFECTS: no side effects reported  Benefit: Yes / No: Yes       ROS   A comprehensive review of systems was negative.       MENTAL STATUS EXAM   Vitals: /87 (Patient Position: Sitting, Cuff Size: Adult Regular)   Pulse 82   Temp 98.1  F (36.7  C)  "  Resp 18   Ht 1.626 m (5' 4\")   Wt 77.3 kg (170 lb 6.7 oz)   SpO2 98%   BMI 29.25 kg/m        Appearance:  No apparent distress  Mood: \"I am fine\"  Affect: full range  was congruent to speech  Suicidal Ideation: PRESENT / ABSENT: absent   Homicidal Ideation: PRESENT / ABSENT: absent   Thought process: [place]   Thought content: Patient stated that the voices are mostly internal.  Fund of Knowledge: Below average  Attention/Concentration: Fair  Language ability:  Intact  Memory:  Immediate recall impaired, Short-term memory impaired and Long-term memory intact  Insight:  fair.  Judgement: fair  Orientation: Yes, x4  Psychomotor Behavior: normal or unremarkable    Muscle Strength and Tone: MuscleStrength: Normal  Gait and Station: Normal       LABS   personally reviewed.   No results found for this or any previous visit (from the past 24 hour(s)).  No lab results found in last 7 days.  No results found for: PHENYTOIN, PHENOBARB, VALPROATE, CBMZ       DIAGNOSIS   Principal Problem:    Schizophrenia, schizoaffective, chronic with acute exacerbation (H)    Active Problem List:  Patient Active Problem List   Diagnosis     Schizophrenia, schizoaffective, chronic with acute exacerbation (H)          PLAN   1. Ongoing education given regarding diagnostic and treatment options with risks, benefits and alternatives and adequate verbalization of understanding.  2.  Medications      Zyprexa to 20 mg at bedtime    3.  Medical team to follow the patient as needed.  4.   coordinating a safe discharge plan with the patient.      Risk Assessment: [facility] RISK ASSESSMENT: Patient able to contract for safety    Coordination of Care:   Treatment Plan reviewed and physician signed, Care discussed with Care/Treatment Team Members, Chart reviewed and Patient seen      Re-Certification I certify that the inpatient psychiatric facility services furnished since the previous certification were, and continue to be, medically " necessary for, either, treatment which could reasonably be expected to improve the patient s condition or diagnostic study and that the hospital records indicate that the services furnished were, either, intensive treatment services, admission and related services necessary for diagnostic study, or equivalent services.     I certify that the patient continues to need, on a daily basis, active treatment furnished directly by or requiring the supervision of inpatient psychiatric facility personnel.   I estimate 14 days of hospitalization is necessary for proper treatment of the patient. My plans for post-hospital care for this patient are  TBD     Paris Mccauley MD    -     02/02/2023  -     9:18 PM    Total time 25 minutes with > 50%spent on coordination of cares and psycho-education.    This note was created with help of Dragon dictation system. Grammatical / typing errors are not intentional.    Paris Mccauley MD

## 2023-02-03 NOTE — PLAN OF CARE
Goal Outcome Evaluation:    Plan of Care Reviewed With: patient      Patient has been present in the unge. He has been working on word puzzles. His affect is flat but brightens upon approach. His mood is calm. He denies SI and SIB. He endorses AH stating they are calling him obscene names,obscene words and telling him he can't. He does endorse they are a lot less since admission. He denies pain. He endorses feeling hopeful. He denies depression and anxiety. His sleep and appetite are good. He attended groups. He has no concerns. He is medication compliant.

## 2023-02-03 NOTE — PLAN OF CARE
02/03/23 1239   Group Therapy Session   Group Attendance attended group session   Time Session Began 1015   Time Session Ended 1150   Total Time (minutes) 95   Total # Attendees 4   Group Type expressive therapy;task skill;psychotherapeutic   Group Topic Covered coping skills/lifestyle management;problem-solving;cognitive therapy techniques;balanced lifestyle;structured socialization   Group Session Detail    Patient Response/Contribution cooperative with task;listened actively   Patient Participation Detail Worked at a constant pace on a familiar highly detailed task. After completion, and with encouragement, he accepted idea of choosing a task requiring more detail and attention. He made decisions and followed through on task work. Appeared more engaged with answers to questions directed to him through the  and when drawn into groups' conversation, he offered answers quicker and elaborated on comments. Seemed more engaged.

## 2023-02-03 NOTE — PLAN OF CARE
" 02/03/23 2012   Individualization/Patient Specific Goals   Patient Personal Strengths Cooperative with treatment   Patient Vulnerabilities Homeless. Multiple histories of psychosis, confusion. Has no social or community support. Language barriers   Anxieties, Fears or Concerns Worried that \"the voices would not go away.\" Worries about housing. Worries about having no support system   Interprofessional Rounds   Summary Pt was admitted due to concerns for his safety. 52 male who has been staying in shelters. He has at least one prior mental health admission and was admitted to [facility] in June of [facility] for similar presentation. He has more than one medical record due to inconsistent spelling of his last name ( Mackenzie vs Ordoñez) and confusion over whether Avalos is middle name or last name.    Pt will be engaged in the therapeutic milieu and groups where he will learn and practice positive coping skills to cope with his symptoms. Will be stabilized via medications and adjunctive interventions including psychotherapy, OT...     Currently, pt is being stabilized. He has been referred to [facility] after getting emergency MA.      Participants    CTC;nursing; psychiatrist   Behavioral Team Discussion   Participants       Dr. Paris Mccauley MD;Nola Swartz, LISSY  ; GAY Gutierres;Juliana Chopra, OT  Plan:      Consults: Hospitalist will be consulted if medical issues arise        Ongoing education given regarding diagnostic and treatment options with risks, benefits and alternatives and adequate verbalization of understanding.     Risk Assessment: [facility] RISK ASSESSMENT: Patient on precautions      Progress Co-operative with treatment. Stabilization is still ongoing. Remains homeless      Medications:  Scheduled Meds:    OLANZapine  15 mg Oral At Bedtime      Continuous Infusions:  PRN Meds:.acetaminophen, alum & mag hydroxide-simethicone, hydrOXYzine, ibuprofen, OLANZapine **OR** OLANZapine, senna-docusate, " traZODone     Medication adherence issues: MS Med Adherence Y/N: Yes, Hospitalization  Medication side effects: MEDICATION SIDE EFFECTS: no side effects reported  Benefit: Yes / No: Yes

## 2023-02-03 NOTE — PROGRESS NOTES
"PSYCHIATRY  PROGRESS NOTE     DATE OF SERVICE   02/03/2023        CHIEF COMPLAINT   \" I am okay\".     SUBJECTIVE   Nursing reports:  [facility] He has been working on word puzzles. His affect is flat but brightens upon approach. His mood is calm. He denies SI and SIB. He endorses AH stating they are calling him obscene names,obscene words and telling him he can't. He does endorse they are a lot less since admission. He denies pain. He endorses feeling hopeful. He denies depression and anxiety. His sleep and appetite are good. He attended groups. He has no concerns. He is medication compliant.      Patient has been discussed with the  earlier today.   informed me that there is a good possibility for the patient to go to [facility] on Monday.     OBJECTIVE   Patient was seen and evaluated at bedside by himself, this was a face-to-face evaluation.  Interview was conducted in Barbadian by this writer.  Patient reported that he is doing well and at this time he denies any concerns.  I discussed with the patient that we are hoping for him to be able to go to the new facility on Monday.  Patient verbalized good understanding and was in agreement with this.  It seems that the patient does not have any belongings except what he brought to the hospital.  He is in agreement with the staff checking his belongings and washing his clothes if this is needed.    In terms of his psychiatric symptoms patient continues to endorse having auditory hallucinations that seem to be at his baseline.  Denies feeling depressed or anxious and has been able to contract for safety.    No lab results found in last 7 days.       MEDICATIONS   Medications:  Scheduled Meds:    OLANZapine  20 mg Oral At Bedtime     Continuous Infusions:  PRN Meds:.acetaminophen, alum & mag hydroxide-simethicone, hydrOXYzine, ibuprofen, OLANZapine **OR** OLANZapine, senna-docusate, traZODone    Medication " "adherence issues: MS Med Adherence Y/N: Yes, Hospitalization  Medication side effects: MEDICATION SIDE EFFECTS: no side effects reported  Benefit: Yes / No: Yes       ROS   A comprehensive review of systems was negative.       MENTAL STATUS EXAM   Vitals: /87   Pulse 82   Temp 97.9  F (36.6  C)   Resp 18   Ht 1.626 m (5' 4\")   Wt 77.3 kg (170 lb 6.7 oz)   SpO2 99%   BMI 29.25 kg/m        Appearance:  No apparent distress  Mood: \"I am okay\"  Affect: full range  was congruent to speech  Suicidal Ideation: PRESENT / ABSENT: absent   Homicidal Ideation: PRESENT / ABSENT: absent   Thought process: [place]   Thought content: Positive for auditory hallucinations that seem to be at his baseline.  Fund of Knowledge: Below average  Attention/Concentration: Fair  Language ability:  Intact  Memory:  Immediate recall impaired, Short-term memory impaired and Long-term memory intact  Insight:  fair.  Judgement: fair  Orientation: Yes, x4  Psychomotor Behavior: normal or unremarkable    Muscle Strength and Tone: MuscleStrength: Normal  Gait and Station: Normal       LABS   personally reviewed.   No results found for this or any previous visit (from the past 24 hour(s)).  No lab results found in last 7 days.  No results found for: PHENYTOIN, PHENOBARB, VALPROATE, CBMZ       DIAGNOSIS   Principal Problem:    Schizophrenia, schizoaffective, chronic with acute exacerbation (H)    Active Problem List:  Patient Active Problem List   Diagnosis     Schizophrenia, schizoaffective, chronic with acute exacerbation (H)          PLAN   1. Ongoing education given regarding diagnostic and treatment options with risks, benefits and alternatives and adequate verbalization of understanding.  2.  Medications      Zyprexa to 20 mg at bedtime    3.  Medical team to follow the patient as needed.  4.   coordinating a safe discharge plan with the patient.      Risk Assessment: [facility] RISK ASSESSMENT: Patient able to contract " for safety    Coordination of Care:   Treatment Plan reviewed and physician signed, Care discussed with Care/Treatment Team Members, Chart reviewed and Patient seen      Re-Certification I certify that the inpatient psychiatric facility services furnished since the previous certification were, and continue to be, medically necessary for, either, treatment which could reasonably be expected to improve the patient s condition or diagnostic study and that the hospital records indicate that the services furnished were, either, intensive treatment services, admission and related services necessary for diagnostic study, or equivalent services.     I certify that the patient continues to need, on a daily basis, active treatment furnished directly by or requiring the supervision of inpatient psychiatric facility personnel.   I estimate 14 days of hospitalization is necessary for proper treatment of the patient. My plans for post-hospital care for this patient are  TBD     Paris Mccauley MD    -     02/03/2023  -     2:39 PM    Total time 25 minutes with > 50%spent on coordination of cares and psycho-education.    This note was created with help of Dragon dictation system. Grammatical / typing errors are not intentional.    Paris Mccauley MD

## 2023-02-03 NOTE — PLAN OF CARE
Problem: Sleep Disturbance  Goal: Adequate Sleep/Rest  Outcome: Progressing   Goal Outcome Evaluation:                      Slept well for 8,5 hours  Independent with his ADL's  No concerns raised tonight.

## 2023-02-04 PROCEDURE — 124N000003 HC R&B MH SENIOR/ADOLESCENT

## 2023-02-04 PROCEDURE — 250N000013 HC RX MED GY IP 250 OP 250 PS 637: Performed by: PSYCHIATRY & NEUROLOGY

## 2023-02-04 RX ADMIN — OLANZAPINE 20 MG: 10 TABLET, FILM COATED ORAL at 20:43

## 2023-02-04 ASSESSMENT — ACTIVITIES OF DAILY LIVING (ADL)
ADLS_ACUITY_SCORE: 32
HYGIENE/GROOMING: INDEPENDENT
ADLS_ACUITY_SCORE: 32
ORAL_HYGIENE: INDEPENDENT
ORAL_HYGIENE: INDEPENDENT
ADLS_ACUITY_SCORE: 32
HYGIENE/GROOMING: INDEPENDENT
ADLS_ACUITY_SCORE: 32
ADLS_ACUITY_SCORE: 32
DRESS: INDEPENDENT
DRESS: INDEPENDENT
ADLS_ACUITY_SCORE: 32

## 2023-02-04 NOTE — PLAN OF CARE
Goal Outcome Evaluation:    Plan of Care Reviewed With: patient          Pt calm, pleasant, cooperative. Eye contact and affect WDL. Attending and participating in therapeutic groups. Denies depression, anxiety, suicidal ideation. Reported auditory hallucinations have diminished to the extent that they are now barely audible. Med-compliant. Appetite, sleep, hygiene good. Denies physical complaints, pain or side effects. Continue with current treatment plan and recommendations. Continue to monitor and reassess symptoms. Monitor response to medications. Monitor progress towards treatment goals. Encourage groups and participation

## 2023-02-04 NOTE — PLAN OF CARE
"Nursing Assessment    Psychosis (H) [F29]    Admit Date: 12/30/2022    Length of Stay: 36    Patient evaluation continues. Assessed mood,anxiety,thoughts and behavior. Patient is progressing towards goals. Patient is encouraged to participate in groups and assisted to develop healthy coping skills.  Patient admitted to   auditory  hallucinations that were \" soft and quiet with no command, Just soft voices\".this shift. /86   Pulse 77   Temp 97.2  F (36.2  C) (Temporal)   Resp 16   Ht 1.626 m (5' 4\")   Wt 77.3 kg (170 lb 6.7 oz)   SpO2 100%   BMI 29.25 kg/m      Mood: ok    Patient reports depression no and reports anxiety no    Affect:flat blunted    Sleep: good 10 hours    Appetite: good 80%    SI: denies    HI: denies    SIB: denies      Medication Compliance yes    Group participation: none this shift    ADL's: independent    Fall risk interventions: proper foot wear, orthostatic B/P    Gutierrez Score Interventions: none    Discharge planning in process    Refer to daily team meeting notes for individualized plan of care. Nursing will continue to assess.    *Scale is 1-10 and 10 is the worst.    Spoke with pt via  services for assessment     Problem: Psychotic Signs/Symptoms  Goal: Decreased Sensory Symptoms (Psychotic Signs/Symptoms)  Outcome: Progressing  Flowsheets (Taken 2/4/2023 1332)  Mutually Determined Action Steps (Decreased Sensory Symptoms): adheres to medication regimen   Goal Outcome Evaluation:    Plan of Care Reviewed With: patient                   "

## 2023-02-04 NOTE — PLAN OF CARE
Problem: Sleep Disturbance  Goal: Adequate Sleep/Rest  Outcome: Progressing   Goal Outcome Evaluation:                 Appeared to have slept uninterrupted for 10 hours  No concerns raised.

## 2023-02-04 NOTE — PROGRESS NOTES
02/03/23 1900   Group Therapy Session   Time Session Began 1900   Time Session Ended 1950   Total Time (minutes) 45   Total # Attendees 3   Group Type expressive therapy   Group Topic Covered balanced lifestyle;self-care activities;relaxation techniques   Group Session Detail Relaxation   Patient Response/Contribution cooperative with task   Patient Participation Detail Cooperatively engaged in Evening Music Relaxation group to decrease anxiety and promote sleep. Appeared in good spirits.  Participated with [place]hi interpretation present.  Calm affect.

## 2023-02-05 PROCEDURE — 250N000013 HC RX MED GY IP 250 OP 250 PS 637: Performed by: PSYCHIATRY & NEUROLOGY

## 2023-02-05 PROCEDURE — 124N000003 HC R&B MH SENIOR/ADOLESCENT

## 2023-02-05 RX ADMIN — OLANZAPINE 20 MG: 10 TABLET, FILM COATED ORAL at 20:15

## 2023-02-05 ASSESSMENT — ACTIVITIES OF DAILY LIVING (ADL)
ADLS_ACUITY_SCORE: 32
ADLS_ACUITY_SCORE: 32
ORAL_HYGIENE: INDEPENDENT
ADLS_ACUITY_SCORE: 32
ADLS_ACUITY_SCORE: 32
DRESS: INDEPENDENT
ADLS_ACUITY_SCORE: 32
HYGIENE/GROOMING: INDEPENDENT
ADLS_ACUITY_SCORE: 32
HYGIENE/GROOMING: INDEPENDENT
ADLS_ACUITY_SCORE: 32
ORAL_HYGIENE: INDEPENDENT
ADLS_ACUITY_SCORE: 32
DRESS: INDEPENDENT

## 2023-02-05 NOTE — PLAN OF CARE
Problem: Sleep Disturbance  Goal: Adequate Sleep/Rest  Outcome: Progressing   Goal Outcome Evaluation:                    Appeared to be sleeping in bed comfortably for 9  hours tonight.  No concerns raised.

## 2023-02-05 NOTE — PLAN OF CARE
Goal Outcome Evaluation:    Plan of Care Reviewed With: patient          Pt calm, pleasant, cooperative. Eye contact and affect WDL. Attending and participating in therapeutic groups. Denies depression, anxiety, suicidal ideation. Reported auditory hallucinations have diminished to the extent that they are now barely audible. Med-compliant. Appetite, sleep, hygiene good. Denies physical complaints, pain or side effects. Continue with current treatment plan and recommendations. Continue to monitor and reassess symptoms. Monitor response to medications. Monitor progress towards treatment goals. Encourage groups and participation.

## 2023-02-05 NOTE — PLAN OF CARE
"Goal Outcome Evaluation:    Plan of Care Reviewed With: patient                 Nursing Assessment    Psychosis (H) [F29]    Admit Date: 12/30/2022    Length of Stay: 37    Patient evaluation continues. Assessed mood,anxiety,thoughts and behavior. Patient is progressing towards goals. Patient is encouraged to participate in groups and assisted to develop healthy coping skills.  Patient admits to auditory  hallucinations that are much less and very soft. /75   Pulse 88   Temp 97.2  F (36.2  C) (Temporal)   Resp 16   Ht 1.626 m (5' 4\")   Wt 77.7 kg (171 lb 4.8 oz)   SpO2 97%   BMI 29.40 kg/m      Mood: good    Patient reports depression none and reports anxiety none    Affect: bright    Sleep: good    Appetite: good    SI: denies    HI: denies    SIB: denies      Medication Compliance yes    Group participation: not this shift    ADL's: independent    Fall risk interventions: proper foot wear, orthostatic B/p    Gutierrez Score Interventions:none    Discharge planning in process    Refer to daily team meeting notes for individualized plan of care. Nursing will continue to assess.    *Scale is 1-10 and 10 is the worst.         "

## 2023-02-06 PROCEDURE — 124N000003 HC R&B MH SENIOR/ADOLESCENT

## 2023-02-06 PROCEDURE — 250N000013 HC RX MED GY IP 250 OP 250 PS 637: Performed by: PSYCHIATRY & NEUROLOGY

## 2023-02-06 PROCEDURE — 99231 SBSQ HOSP IP/OBS SF/LOW 25: CPT | Performed by: PSYCHIATRY & NEUROLOGY

## 2023-02-06 PROCEDURE — G0177 OPPS/PHP; TRAIN & EDUC SERV: HCPCS

## 2023-02-06 RX ADMIN — OLANZAPINE 20 MG: 10 TABLET, FILM COATED ORAL at 20:06

## 2023-02-06 ASSESSMENT — ACTIVITIES OF DAILY LIVING (ADL)
ADLS_ACUITY_SCORE: 32
LAUNDRY: UNABLE TO COMPLETE
ADLS_ACUITY_SCORE: 32
HYGIENE/GROOMING: INDEPENDENT
ADLS_ACUITY_SCORE: 32
ADLS_ACUITY_SCORE: 32
DRESS: INDEPENDENT;SCRUBS (BEHAVIORAL HEALTH)
ADLS_ACUITY_SCORE: 32
ORAL_HYGIENE: INDEPENDENT
ADLS_ACUITY_SCORE: 32

## 2023-02-06 NOTE — PLAN OF CARE
"  Problem: Psychotic Signs/Symptoms  Goal: Improved Psychomotor Symptoms (Psychotic Signs/Symptoms)  Outcome: Progressing  Intervention: Manage Psychomotor Movement  Recent Flowsheet Documentation  Taken 2/6/2023 0933 by Farideh Auguste RN  Patient Performed Hygiene: dressed  Diversional Activity: television  Activity (Behavioral Health): up ad libNursing Assessment    Psychosis (H) [F29]    Admit Date: 12/30/2022    Length of Stay: 38    Patient evaluation continues. Assessed mood,anxiety,thoughts and behavior. Patient is  progressing towards goals. Pt continues to state that the voices are very soft and he can not tell what they are saying. Mood is good, affect is bright,Patient is encouraged to participate in groups and assisted to develop healthy coping skills.  Patient admits to auditory  hallucinations. /88 (Patient Position: Sitting)   Pulse 85   Temp 98  F (36.7  C) (Temporal)   Resp 16   Ht 1.626 m (5' 4\")   Wt 77.7 kg (171 lb 4.8 oz)   SpO2 98%   BMI 29.40 kg/m      Mood: good    Patient reports depression none and reports anxiety none    Affect: bright    Sleep: good    Appetite: good    SI: denies    HI: denies    SIB: denies      Medication Compliance yes    Group participation:no    ADL's: independent    Fall risk interventions: proper foot wear, orthostatic B/P    Gutierrez Score Interventions: none    Discharge planning in process    Refer to daily team meeting notes for individualized plan of care. Nursing will continue to assess.    *Scale is 1-10 and 10 is the worst.            Problem: Psychotic Signs/Symptoms  Goal: Improved Psychomotor Symptoms (Psychotic Signs/Symptoms)  Intervention: Manage Psychomotor Movement  Recent Flowsheet Documentation  Taken 2/6/2023 0933 by Farideh Auguste, RN  Patient Performed Hygiene: dressed  Diversional Activity: television  Activity (Behavioral Health): up ad jessica   Goal Outcome Evaluation:    Plan of Care Reviewed With: patient                   "

## 2023-02-06 NOTE — PLAN OF CARE
02/06/23 1441   Group Therapy Session   Group Attendance attended group session   Time Session Began 1315   Time Session Ended 1415   Total Time (minutes) 45   Total # Attendees 3   Group Type life skill;recreation;task skill   Group Topic Covered cognitive activities;coping skills/lifestyle management;problem-solving;leisure exploration/use of leisure time;structured socialization   Group Session Detail OT [employer] for creativity, social engagement, healthy distraction, symptom management, focus, and following directions   Patient Response/Contribution cooperative with task;listened actively;organized   Patient Participation Detail pt arrived with . pt was independent with supplies and task for duration of group. pt worked quietly and meticulously on project. pt endorsed positive response to hands on activities.

## 2023-02-06 NOTE — PLAN OF CARE
02/06/23 1411   Group Therapy Session   Group Attendance attended group session   Time Session Began 1015   Time Session Ended 1105   Total Time (minutes) 50   Total # Attendees 3   Group Type task skill;life skill;psychotherapeutic   Group Topic Covered coping skills/lifestyle management;problem-solving;cognitive therapy techniques   Group Session Detail [facility] group to facilitate coping skill exploration, use of cognitive skills and problem solving, creative expression, clinical observation and facilitation of social, cognitive, and kinesthetic performance skills.    Patient Response/Contribution cooperative with task;listened actively;organized   Patient Participation Detail Worked quietly and at a constant pace on a familiar task using highly detailed steps. Work and attention was attentive to detail and neatly done.

## 2023-02-06 NOTE — PLAN OF CARE
02/06/23 1417   Group Therapy Session   Group Attendance attended group session   Time Session Began 1005   Time Session Ended 1150   Total Time (minutes) 45   Total # Attendees 3   Group Type expressive therapy;task skill;psychotherapeutic   Group Topic Covered problem-solving;cognitive therapy techniques;cognitive activities   Group Session Detail Activity using visuospatial concepts in problem solving.   Patient Response/Contribution cooperative with task;listened actively;organized   Patient Participation Detail Reviewed the process of the activity through the directions provided by the . Followed through in problem solving effectively and with some more advanced solutions. Pleasant in interactions. In interactions about how pt has been appearing to be sleeping in the am when invited to groups, and seeming to need 2 separate invitations to get out of bed, he stated feeling more tired in the mornings and will report this to his Dr.     :

## 2023-02-06 NOTE — PLAN OF CARE
Problem: Plan of Care - These are the overarching goals to be used throughout the patient stay.    Goal: Plan of Care Review  Description: The Plan of Care Review/Shift note should be completed every shift.  The Outcome Evaluation is a brief statement about your assessment that the patient is improving, declining, or no change.  This information will be displayed automatically on your shift note.  Outcome: Progressing  Flowsheets (Taken 2/6/2023 1722)  Plan of Care Reviewed With: patient  Overall Patient Progress: improving     Patient was isolative and withdrawn. Resting in bed majority of the shift. Flat and blunted affect. Contacted  Services and writer was able to talk with patient via . Pt endorsed auditory hallucinations. He said the voices were telling him not to stay on the same place. He denied SI, SIB, anxiety depression and VH. Adequate food and fluid intake noted at supper. He was compliant with his night time medication. Denied pain and side effects of meds.

## 2023-02-06 NOTE — PROGRESS NOTES
"PSYCHIATRY  PROGRESS NOTE     DATE OF SERVICE   02/06/2023        CHIEF COMPLAINT   \" I am okay\".     SUBJECTIVE   Nursing reports:  Pt calm, pleasant, cooperative. Eye contact and affect WDL. Attending and participating in therapeutic groups. Denies depression, anxiety, suicidal ideation. Reported auditory hallucinations have diminished to the extent that they are now barely audible. Med-compliant. Appetite, sleep, hygiene good. Denies physical complaints, pain or side effects. Continue with current treatment plan and recommendations. Continue to monitor and reassess symptoms. Monitor response to medications. Monitor progress towards treatment goals. Encourage groups and participation.        Patient has been discussed with the  earlier today.   has not received any updates today from [employer].  We continue to await for him being accepted to an intensive residential treatment facility.     OBJECTIVE   Patient was seen and evaluated in the day area by himself, this was a face-to-face evaluation.  Patient reported that he is doing well and at this time denies any concerns.  Continues to endorse having auditory hallucinations that as per patient is not bothering him.  He is denying any other psychiatric symptoms and has been able to contract for safety.  Patient has continued to agree to stay in the hospital waiting to be transferred to an intensive residential treatment facility.    No lab results found in last 7 days.       MEDICATIONS   Medications:  Scheduled Meds:    OLANZapine  20 mg Oral At Bedtime     Continuous Infusions:  PRN Meds:.acetaminophen, alum & mag hydroxide-simethicone, hydrOXYzine, ibuprofen, OLANZapine **OR** OLANZapine, senna-docusate, traZODone    Medication adherence issues: MS Med Adherence Y/N: Yes, Hospitalization  Medication side effects: MEDICATION SIDE EFFECTS: no side effects reported  Benefit: Yes / No: Yes       ROS   A comprehensive review of " "systems was negative.       MENTAL STATUS EXAM   Vitals: /88 (Patient Position: Sitting)   Pulse 85   Temp 98  F (36.7  C) (Temporal)   Resp 16   Ht 1.626 m (5' 4\")   Wt 77.7 kg (171 lb 4.8 oz)   SpO2 98%   BMI 29.40 kg/m      Same as last visit  Appearance:  No apparent distress  Mood: \"I am okay\"  Affect: full range  was congruent to speech  Suicidal Ideation: PRESENT / ABSENT: absent   Homicidal Ideation: PRESENT / ABSENT: absent   Thought process: [place]   Thought content: Positive for auditory hallucinations that seem to be at his baseline.  Fund of Knowledge: Below average  Attention/Concentration: Fair  Language ability:  Intact  Memory:  Immediate recall impaired, Short-term memory impaired and Long-term memory intact  Insight:  fair.  Judgement: fair  Orientation: Yes, x4  Psychomotor Behavior: normal or unremarkable    Muscle Strength and Tone: MuscleStrength: Normal  Gait and Station: Normal       LABS   personally reviewed.   No results found for this or any previous visit (from the past 24 hour(s)).  No lab results found in last 7 days.  No results found for: PHENYTOIN, PHENOBARB, VALPROATE, CBMZ       DIAGNOSIS   Principal Problem:    Schizophrenia, schizoaffective, chronic with acute exacerbation (H)    Active Problem List:  Patient Active Problem List   Diagnosis     Schizophrenia, schizoaffective, chronic with acute exacerbation (H)          PLAN   1. Ongoing education given regarding diagnostic and treatment options with risks, benefits and alternatives and adequate verbalization of understanding.  2.  Medications      Zyprexa to 20 mg at bedtime    3.  Medical team to follow the patient as needed.  4.   coordinating a safe discharge plan with the patient.      [facility]: Patient able to contract for safety    Coordination of Care:   Treatment Plan reviewed and physician signed, Care discussed with Care/Treatment Team Members, Chart " reviewed and Patient seen      Re-Certification I certify that the inpatient psychiatric facility services furnished since the previous certification were, and continue to be, medically necessary for, either, treatment which could reasonably be expected to improve the patient s condition or diagnostic study and that the hospital records indicate that the services furnished were, either, intensive treatment services, admission and related services necessary for diagnostic study, or equivalent services.     I certify that the patient continues to need, on a daily basis, active treatment furnished directly by or requiring the supervision of inpatient psychiatric facility personnel.   I estimate 14 days of hospitalization is necessary for proper treatment of the patient. My plans for post-hospital care for this patient are  TBD     Paris Mccauley MD    -     02/06/2023  -     3:18 PM    Total time 25 minutes with > 50%spent on coordination of cares and psycho-education.    This note was created with help of Dragon dictation system. Grammatical / typing errors are not intentional.    Paris Mccauley MD

## 2023-02-06 NOTE — PLAN OF CARE
Problem: Psychotic Signs/Symptoms  Goal: Improved Sleep (Psychotic Signs/Symptoms)  Outcome: Progressing   Goal Outcome Evaluation:                    Slept well for  7.5 hours  No behavioral issues encountered tonight.

## 2023-02-07 PROCEDURE — H2032 ACTIVITY THERAPY, PER 15 MIN: HCPCS

## 2023-02-07 PROCEDURE — 250N000013 HC RX MED GY IP 250 OP 250 PS 637: Performed by: PSYCHIATRY & NEUROLOGY

## 2023-02-07 PROCEDURE — G0177 OPPS/PHP; TRAIN & EDUC SERV: HCPCS

## 2023-02-07 PROCEDURE — 124N000003 HC R&B MH SENIOR/ADOLESCENT

## 2023-02-07 RX ORDER — OLANZAPINE 15 MG/1
15 TABLET ORAL AT BEDTIME
Status: DISCONTINUED | OUTPATIENT
Start: 2023-02-07 | End: 2023-02-17

## 2023-02-07 RX ADMIN — OLANZAPINE 15 MG: 15 TABLET, FILM COATED ORAL at 20:15

## 2023-02-07 ASSESSMENT — ACTIVITIES OF DAILY LIVING (ADL)
ORAL_HYGIENE: INDEPENDENT
ADLS_ACUITY_SCORE: 32
ORAL_HYGIENE: INDEPENDENT
ADLS_ACUITY_SCORE: 32
DRESS: INDEPENDENT
ADLS_ACUITY_SCORE: 32
HYGIENE/GROOMING: INDEPENDENT
ADLS_ACUITY_SCORE: 32
HYGIENE/GROOMING: INDEPENDENT
DRESS: INDEPENDENT;SCRUBS (BEHAVIORAL HEALTH)
ADLS_ACUITY_SCORE: 32

## 2023-02-07 NOTE — PLAN OF CARE
CTC: Called and left a message for [employer] ([phone]) regarding the status of this pt's referral.    CTC: Update: Writer received a call from [employer] who reported that the pt's insurance has been unable to be verified. Writer provided the insurance information again and staff confirmed the number and said that she will pass the information across to the assessment staff. Also clarified that the pt only speaks Kosovan.

## 2023-02-07 NOTE — PLAN OF CARE
"  Problem: Psychotic Signs/Symptoms  Goal: Improved Psychomotor Symptoms (Psychotic Signs/Symptoms)  Intervention: Manage Psychomotor Movement  Recent Flowsheet Documentation  Taken 2/7/2023 1142 by Nola Swartz RN  Patient Performed Hygiene: dressed  Activity (Behavioral Health): up ad jessica     Problem: Psychotic Signs/Symptoms  Goal: Improved Mood Symptoms  Outcome: Progressing  Flowsheets (Taken 2/7/2023 1317)  Mutually Determined Action Steps (Improved Mood Symptoms): engages in physical activity     Problem: Plan of Care - These are the overarching goals to be used throughout the patient stay.    Goal: Absence of Hospital-Acquired Illness or Injury  Intervention: Identify and Manage Fall Risk  Recent Flowsheet Documentation  Taken 2/7/2023 1142 by Nola Swartz RN  Safety Promotion/Fall Prevention:   nonskid shoes/slippers when out of bed   clutter free environment maintained   check orthostatic blood pressure   safety round/check completed   Goal Outcome Evaluation:    Plan of Care Reviewed With: patient      Writer met with patient and interpretor/via phone. Pt denies depression/anxiety/SI or thoughts of self harm. Pt does admit to auditory hallucinations which he reports have improved. Pt reports that they tell him \"I can't stay in one place\".   Pt's affect is flat with appropriate eye contact.   Pt oriented to self, [facility], February, 7th, 2023, [place], and Tuesday. Pt denies pain.  Pt attended group this AM with prompts.                  "

## 2023-02-07 NOTE — PLAN OF CARE
02/07/23 1418   Group Therapy Session   Group Attendance attended group session   Time Session Began 1325   Time Session Ended 1410   Total Time (minutes) 45   Total # Attendees 2   Group Type psychotherapeutic   Group Topic Covered problem-solving;cognitive therapy techniques;cognitive activities   Group Session Detail Activity using visuospatial concept problem solving   Patient Response/Contribution cooperative with task;organized   Patient Participation Detail He was successful in identifying solutions using visuospatial skills. There was not an  present. Quick to take his turns without cues. Needed no assistance in identifying solutions that were more challenging and considering all choices and options.

## 2023-02-07 NOTE — PLAN OF CARE
"   02/07/23 1159   Group Therapy Session   Group Attendance attended group session   Time Session Began 1015   Time Session Ended 1115   Total Time (minutes) 60   Total # Attendees 5   Group Type expressive therapy;task skill;psychotherapeutic   Group Topic Covered coping skills/lifestyle management;cognitive therapy techniques;cognitive activities;structured socialization   Group Session Detail [facility] group to facilitate coping skill exploration, use of cognitive skills and problem solving, creative expression, clinical observation and facilitation of social, cognitive, and kinesthetic performance skills.    Patient Response/Contribution cooperative with task;organized   Patient Participation Detail Worked quietly, at a constant pace on a familiar 1 step highly detailed task. No  was present. He was pleasant , stated he was \"OK\"                              "

## 2023-02-07 NOTE — PLAN OF CARE
Problem: Sleep Disturbance  Goal: Adequate Sleep/Rest  Outcome: Progressing   Goal Outcome Evaluation:                  Slept well for 9 hours  No concerns raised tonight

## 2023-02-08 ENCOUNTER — OFFICE VISIT (OUTPATIENT)
Dept: INTERPRETER SERVICES | Facility: CLINIC | Age: 53
End: 2023-02-08
Payer: MEDICAID

## 2023-02-08 PROCEDURE — 250N000013 HC RX MED GY IP 250 OP 250 PS 637: Performed by: PSYCHIATRY & NEUROLOGY

## 2023-02-08 PROCEDURE — G0177 OPPS/PHP; TRAIN & EDUC SERV: HCPCS

## 2023-02-08 PROCEDURE — 99231 SBSQ HOSP IP/OBS SF/LOW 25: CPT | Performed by: PSYCHIATRY & NEUROLOGY

## 2023-02-08 PROCEDURE — 124N000003 HC R&B MH SENIOR/ADOLESCENT

## 2023-02-08 PROCEDURE — T1013 SIGN LANG/ORAL INTERPRETER: HCPCS | Mod: GT

## 2023-02-08 RX ADMIN — OLANZAPINE 15 MG: 15 TABLET, FILM COATED ORAL at 20:13

## 2023-02-08 ASSESSMENT — ACTIVITIES OF DAILY LIVING (ADL)
ADLS_ACUITY_SCORE: 32
ADLS_ACUITY_SCORE: 32
LAUNDRY: UNABLE TO COMPLETE
ADLS_ACUITY_SCORE: 32
DRESS: INDEPENDENT;SCRUBS (BEHAVIORAL HEALTH)
ADLS_ACUITY_SCORE: 32
ORAL_HYGIENE: INDEPENDENT
ADLS_ACUITY_SCORE: 32
ADLS_ACUITY_SCORE: 32
ORAL_HYGIENE: INDEPENDENT
ADLS_ACUITY_SCORE: 32
DRESS: INDEPENDENT
HYGIENE/GROOMING: INDEPENDENT
ADLS_ACUITY_SCORE: 32
HYGIENE/GROOMING: INDEPENDENT

## 2023-02-08 NOTE — PLAN OF CARE
02/08/23 1527   Group Therapy Session   Group Attendance attended group session   Time Session Began 1300   Time Session Ended 1400   Total Time (minutes) 60   Total # Attendees 1-2   Group Type recreation;task skill   Group Topic Covered cognitive activities;coping skills/lifestyle management;problem-solving;leisure exploration/use of leisure time;structured socialization   Group Session Detail OT [employer]ions group-[employer] for creativity, following directions, frustration tolerance, focus, symptom management, healthy distraction, and problem solving   Patient Response/Contribution cooperative with task;organized   Patient Participation Detail pt actively engaged in group activity. pt was independent with supplies and task following initial instructions and demonstration provided.  present. pt worked quietly and methodically for duration of group. pt demonstrated frustration tolerance while trying to peel top layer of paper off, pt worked through this frustration and was able to generate a solution independently. pt cleaned up after self.

## 2023-02-08 NOTE — PLAN OF CARE
Problem: Psychotic Signs/Symptoms  Goal: Improved Psychomotor Symptoms (Psychotic Signs/Symptoms)  Intervention: Manage Psychomotor Movement  Recent Flowsheet Documentation  Taken 2/8/2023 1108 by Nola Swartz RN  Patient Performed Hygiene: dressed  Activity (Behavioral Health): up ad jessica     Problem: Sleep Disturbance  Goal: Adequate Sleep/Rest  Outcome: Progressing     Problem: Depression  Goal: Improved Mood  Outcome: Progressing     Problem: Plan of Care - These are the overarching goals to be used throughout the patient stay.    Goal: Absence of Hospital-Acquired Illness or Injury  Intervention: Prevent Skin Injury  Recent Flowsheet Documentation  Taken 2/8/2023 1108 by Nola Swartz RN  Body Position: position changed independently   Goal Outcome Evaluation:    Plan of Care Reviewed With: patient      Writer met with patient and in person interpretor.    Pt denies depression, anxiety and SI/SIB. Pt reports auditory hallucinations are less. Pt denies pain. Pt denies issues with sleep or appetite. Pt's affect is flat but he brightens during interactions. Pt's eye contact is appropriate.  Pt attended group this AM with interpretor.

## 2023-02-08 NOTE — PLAN OF CARE
"Occupational Therapy     02/08/23 1400   Group Therapy Session   Group Attendance attended group session   Time Session Began 1115   Time Session Ended 1200   Total Time (minutes) 45   Total # Attendees 2   Group Type recreation   Group Topic Covered balanced lifestyle;leisure exploration/use of leisure time;structured socialization   Group Session Detail OT: Education on physical and social wellness with physical movement (Theraband) and interactive social activity (Chat Pack) to increase concentration, attention to task, symptom management, coping with stress, sharing information about self, listening to others, physical wellness, social wellness, and overall well-being   Patient Response/Contribution cooperative with task;other (see comments)  (pleasant; engaged)   Patient Participation Detail Pt sat away from peer to complete presented activity and did not engage in social interactions with peer or staff. Pt actively participated in all physical movement activities and answered all questions about himself. Pt shared he would like to move to [place] because [place] has been called \"[place].\" Pt reported he has family and friends that had been in [place] but have since returned to [place]. Pt struggled to recall a fact about another person, but was able to recall pieces of information from the large group discussion. Pt reported he enjoyed the activity.  present for duration.          "

## 2023-02-08 NOTE — PLAN OF CARE
Problem: Psychotic Signs/Symptoms  Goal: Improved Sleep (Psychotic Signs/Symptoms)  Outcome: Progressing   Goal Outcome Evaluation:                    Slept well for 10 hours  No concerns raised tonight.

## 2023-02-08 NOTE — PROGRESS NOTES
02/07/23 2000   Group Therapy Session   Group Attendance attended group session   Time Session Began 1900   Time Session Ended 1950   Total Time (minutes) 45   Total # Attendees 2-3   Group Type recreation   Group Topic Covered leisure exploration/use of leisure time   Group Session Detail TR leisure group   Patient Response/Contribution cooperative with task   Patient Participation Detail Pt actively participated in a structured Therapeutic Recreation group with a focus on leisure participation, stress reduction, and social engagement via an active group game. Pt remained focused and engaged throughout full duration of group.  Pt mood was calm and was appropriate with interactions. Pt appeared to brighten slightly with social interaction, but does not say much throughout the group.

## 2023-02-08 NOTE — PLAN OF CARE
Occupational Therapy       02/08/23 1300   Group Therapy Session   Group Attendance attended group session   Time Session Began 1015   Time Session Ended 1115   Total Time (minutes) 60   Total # Attendees 3   Group Type task skill   Group Topic Covered cognitive activities;coping skills/lifestyle management;leisure exploration/use of leisure time;problem-solving;structured socialization   Group Session Detail OT: Education on healthy activity engagement with creative hands on endeavor (OT clinic) to increase concentration, focus, attention to task/detail, decision making, problem solving, frustration tolerance, task follow through, coping with stress, healthy leisure engagement, creative expression, and social engagement   Patient Response/Contribution cooperative with task   Patient Participation Detail Pt sat among peers to compete selected creative hands on endeavor and did not engage in social interactions with peers. Pt independently attended to task and able to recall directions from a previous session. Pt worked in a neat and tidy manner to complete project. Pt reported he enjoys the project and is looking forward to it's completion. Pt cleaned-up all supplies and workspace.  present for duration.

## 2023-02-08 NOTE — PLAN OF CARE
"CTC:  Spoke with [facility] staff yesterday who reported that pt's referral is still under review. Received the following information from [facility]:  \"Hello,     Can you send me a copy of the document that was sent to [facility] for the emergency MA? Our billing team will need that due to restrictions with emergency MA in order to proceed. Thanks.     Dedrick Baca  IRTS   [email or web address]  [email or web address]  My Direct Number: [phone]  Fax: [phone]  [facility] - [facility] Intake Line - [phone]\"    Plan: Writer has sent the message to the financial services department to help with the information being requested by [facility].   Writer will forward the information to [facility] as soon as it is received from the financial services department. Also asked if the information could be sent directly to [facility] from the financial services department.    "

## 2023-02-09 ENCOUNTER — APPOINTMENT (OUTPATIENT)
Dept: INTERPRETER SERVICES | Facility: CLINIC | Age: 53
End: 2023-02-09
Payer: MEDICAID

## 2023-02-09 PROCEDURE — 99231 SBSQ HOSP IP/OBS SF/LOW 25: CPT | Performed by: PSYCHIATRY & NEUROLOGY

## 2023-02-09 PROCEDURE — 250N000013 HC RX MED GY IP 250 OP 250 PS 637: Performed by: PSYCHIATRY & NEUROLOGY

## 2023-02-09 PROCEDURE — H2032 ACTIVITY THERAPY, PER 15 MIN: HCPCS

## 2023-02-09 PROCEDURE — 124N000003 HC R&B MH SENIOR/ADOLESCENT

## 2023-02-09 PROCEDURE — G0177 OPPS/PHP; TRAIN & EDUC SERV: HCPCS

## 2023-02-09 RX ADMIN — OLANZAPINE 15 MG: 15 TABLET, FILM COATED ORAL at 20:15

## 2023-02-09 ASSESSMENT — ACTIVITIES OF DAILY LIVING (ADL)
ADLS_ACUITY_SCORE: 32
HYGIENE/GROOMING: INDEPENDENT
ADLS_ACUITY_SCORE: 32
DRESS: SCRUBS (BEHAVIORAL HEALTH)
LAUNDRY: UNABLE TO COMPLETE
ADLS_ACUITY_SCORE: 32
DRESS: INDEPENDENT;SCRUBS (BEHAVIORAL HEALTH)
HYGIENE/GROOMING: INDEPENDENT
ADLS_ACUITY_SCORE: 32
ORAL_HYGIENE: INDEPENDENT
ADLS_ACUITY_SCORE: 32
ORAL_HYGIENE: INDEPENDENT
ADLS_ACUITY_SCORE: 32
LAUNDRY: WITH SUPERVISION

## 2023-02-09 NOTE — PLAN OF CARE
CTC: Received a message from [employer]. Reporting that they need information on pt's finances. Have sent a question to financial services. Awaiting response.

## 2023-02-09 NOTE — PLAN OF CARE
Problem: Adult Behavioral Health Plan of Care  Goal: Patient-Specific Goal (Individualization)  Outcome: Progressing  Goal Outcome Evaluation:  Plan of Care Reviewed With: patient      Patient is alert and oriented Georgian speaking only, is calm pleasant and cooperative with cares. With the  present patient denied all mental health symptoms, reported auditory hallucinations are still there, he can hear them from the back telling not to stop at one place. Patient reports sleeping good through the night, appetite is good, eating and drinking adequately, is independent with ADL's , attends group therapy

## 2023-02-09 NOTE — PLAN OF CARE
Goal Outcome Evaluation:       Patient had an uninterrupted sleep the whole night for 10.75 hours. Nothing unusual noted. No complaints made. No concerns raised.

## 2023-02-09 NOTE — PLAN OF CARE
02/09/23 1503   Group Therapy Session   Group Attendance attended group session   Time Session Began 1400   Time Session Ended 1445   Total Time (minutes) 30  (no charge)   Total # Attendees 2   Group Type recreation;task skill   Group Topic Covered cognitive activities;coping skills/lifestyle management;problem-solving;leisure exploration/use of leisure time   Group Session Detail OT Wellness Group-Cognitive wellness activity for social engagement, turn taking, focus, healthy distraction, symptom management, sequencing, problem solving, following directions, cognition, healthy leisure exploration   Patient Response/Contribution cooperative with task;organized;listened actively   Patient Participation Detail pt readily engaged in all groups. pt continues to demonstrate excellent skills in following directions and picking up on novel tasks with initial instructions and demonstration. pt was independent with task following initial instructions. pt sequenced task without assist and was able to complete simple mathematical addition in is head. pt is pleasant and quiet.  present for duration

## 2023-02-09 NOTE — PLAN OF CARE
"   02/09/23 1431   Group Therapy Session   Group Attendance attended group session   Time Session Began 1300   Time Session Ended 1345   Total Time (minutes) 45   Total # Attendees 3   Group Type psychotherapeutic   Group Topic Covered coping skills/lifestyle management;cognitive therapy techniques;structured socialization;balanced lifestyle;self-care activities   Group Session Detail Activity of positive thinking and focus   Patient Response/Contribution cooperative with task;listened actively;organized   Patient Participation Detail Through the , offered answer in context and elaborated on them. Talked about a great day would be to \"work as a  and communicate with my family\". Offered insightful comments about thinking positively, and respecting others. Pleasant and seemed interested in being involved and participating.                             "

## 2023-02-09 NOTE — PLAN OF CARE
02/09/23 1156   Group Therapy Session   Group Attendance attended group session   Time Session Began 1015   Time Session Ended 1115   Total Time (minutes) 60   Total # Attendees 4   Group Type expressive therapy;task skill;psychotherapeutic   Group Topic Covered coping skills/lifestyle management;problem-solving;cognitive activities;structured socialization   Group Session Detail Occupational Therapy Clinic group to facilitate coping skill exploration, use of cognitive skills and problem solving, creative expression, clinical observation and facilitation of social, cognitive, and kinesthetic performance skills.    Patient Response/Contribution cooperative with task;listened actively;organized   Patient Participation Detail Participated in a familiar activity using decision making, problem solving skills and concentration. He quickly re learned steps, was attentive to details and reminded a peer whose turn it was. No reminders of details were needed.

## 2023-02-10 ENCOUNTER — APPOINTMENT (OUTPATIENT)
Dept: GENERAL RADIOLOGY | Facility: CLINIC | Age: 53
End: 2023-02-10
Payer: MEDICAID

## 2023-02-10 LAB
ALBUMIN SERPL BCG-MCNC: 4.4 G/DL (ref 3.5–5.2)
ALBUMIN UR-MCNC: NEGATIVE MG/DL
ALP SERPL-CCNC: 100 U/L (ref 40–129)
ALT SERPL W P-5'-P-CCNC: 40 U/L (ref 10–50)
ANION GAP SERPL CALCULATED.3IONS-SCNC: 11 MMOL/L (ref 7–15)
APPEARANCE UR: CLEAR
AST SERPL W P-5'-P-CCNC: 30 U/L (ref 10–50)
BASOPHILS # BLD AUTO: 0.1 10E3/UL (ref 0–0.2)
BASOPHILS NFR BLD AUTO: 1 %
BILIRUB SERPL-MCNC: 0.2 MG/DL
BILIRUB UR QL STRIP: NEGATIVE
BUN SERPL-MCNC: 12.6 MG/DL (ref 6–20)
C PNEUM DNA SPEC QL NAA+PROBE: NOT DETECTED
CALCIUM SERPL-MCNC: 9.4 MG/DL (ref 8.6–10)
CHLORIDE SERPL-SCNC: 102 MMOL/L (ref 98–107)
COLOR UR AUTO: ABNORMAL
CREAT SERPL-MCNC: 0.62 MG/DL (ref 0.67–1.17)
D DIMER PPP FEU-MCNC: 0.32 UG/ML FEU (ref 0–0.5)
DEPRECATED HCO3 PLAS-SCNC: 26 MMOL/L (ref 22–29)
EOSINOPHIL # BLD AUTO: 0.4 10E3/UL (ref 0–0.7)
EOSINOPHIL NFR BLD AUTO: 3 %
ERYTHROCYTE [DISTWIDTH] IN BLOOD BY AUTOMATED COUNT: 12.8 % (ref 10–15)
FLUAV H1 2009 PAND RNA SPEC QL NAA+PROBE: NOT DETECTED
FLUAV H1 RNA SPEC QL NAA+PROBE: NOT DETECTED
FLUAV H3 RNA SPEC QL NAA+PROBE: NOT DETECTED
FLUAV RNA SPEC QL NAA+PROBE: NEGATIVE
FLUAV RNA SPEC QL NAA+PROBE: NOT DETECTED
FLUBV RNA RESP QL NAA+PROBE: NEGATIVE
FLUBV RNA SPEC QL NAA+PROBE: NOT DETECTED
GFR SERPL CREATININE-BSD FRML MDRD: >90 ML/MIN/1.73M2
GLUCOSE SERPL-MCNC: 136 MG/DL (ref 70–99)
GLUCOSE UR STRIP-MCNC: 100 MG/DL
HADV DNA SPEC QL NAA+PROBE: NOT DETECTED
HCOV PNL SPEC NAA+PROBE: NOT DETECTED
HCT VFR BLD AUTO: 44.9 % (ref 40–53)
HGB BLD-MCNC: 15 G/DL (ref 13.3–17.7)
HGB UR QL STRIP: NEGATIVE
HMPV RNA SPEC QL NAA+PROBE: NOT DETECTED
HPIV1 RNA SPEC QL NAA+PROBE: NOT DETECTED
HPIV2 RNA SPEC QL NAA+PROBE: NOT DETECTED
HPIV3 RNA SPEC QL NAA+PROBE: NOT DETECTED
HPIV4 RNA SPEC QL NAA+PROBE: NOT DETECTED
IMM GRANULOCYTES # BLD: 0 10E3/UL
IMM GRANULOCYTES NFR BLD: 0 %
KETONES UR STRIP-MCNC: NEGATIVE MG/DL
LACTATE SERPL-SCNC: 1.5 MMOL/L (ref 0.7–2)
LEUKOCYTE ESTERASE UR QL STRIP: NEGATIVE
LYMPHOCYTES # BLD AUTO: 1.7 10E3/UL (ref 0.8–5.3)
LYMPHOCYTES NFR BLD AUTO: 14 %
M PNEUMO DNA SPEC QL NAA+PROBE: NOT DETECTED
MAGNESIUM SERPL-MCNC: 2 MG/DL (ref 1.7–2.3)
MCH RBC QN AUTO: 28.6 PG (ref 26.5–33)
MCHC RBC AUTO-ENTMCNC: 33.4 G/DL (ref 31.5–36.5)
MCV RBC AUTO: 86 FL (ref 78–100)
MONOCYTES # BLD AUTO: 1.3 10E3/UL (ref 0–1.3)
MONOCYTES NFR BLD AUTO: 11 %
NEUTROPHILS # BLD AUTO: 8.4 10E3/UL (ref 1.6–8.3)
NEUTROPHILS NFR BLD AUTO: 71 %
NITRATE UR QL: NEGATIVE
NRBC # BLD AUTO: 0 10E3/UL
NRBC BLD AUTO-RTO: 0 /100
NT-PROBNP SERPL-MCNC: 149 PG/ML (ref 0–900)
PH UR STRIP: 6.5 [PH] (ref 5–7)
PLATELET # BLD AUTO: 286 10E3/UL (ref 150–450)
POTASSIUM SERPL-SCNC: 4.3 MMOL/L (ref 3.4–5.3)
PROT SERPL-MCNC: 7.6 G/DL (ref 6.4–8.3)
RBC # BLD AUTO: 5.24 10E6/UL (ref 4.4–5.9)
RSV RNA SPEC NAA+PROBE: NEGATIVE
RSV RNA SPEC QL NAA+PROBE: NOT DETECTED
RSV RNA SPEC QL NAA+PROBE: NOT DETECTED
RV+EV RNA SPEC QL NAA+PROBE: NOT DETECTED
SARS-COV-2 RNA RESP QL NAA+PROBE: NEGATIVE
SODIUM SERPL-SCNC: 139 MMOL/L (ref 136–145)
SP GR UR STRIP: 1.02 (ref 1–1.03)
TROPONIN T SERPL HS-MCNC: <6 NG/L
TSH SERPL DL<=0.005 MIU/L-ACNC: 0.77 UIU/ML (ref 0.3–4.2)
UROBILINOGEN UR STRIP-MCNC: NORMAL MG/DL
WBC # BLD AUTO: 11.8 10E3/UL (ref 4–11)

## 2023-02-10 PROCEDURE — 83880 ASSAY OF NATRIURETIC PEPTIDE: CPT

## 2023-02-10 PROCEDURE — 85379 FIBRIN DEGRADATION QUANT: CPT

## 2023-02-10 PROCEDURE — 93010 ELECTROCARDIOGRAM REPORT: CPT | Performed by: INTERNAL MEDICINE

## 2023-02-10 PROCEDURE — 71046 X-RAY EXAM CHEST 2 VIEWS: CPT | Mod: 26 | Performed by: RADIOLOGY

## 2023-02-10 PROCEDURE — 87633 RESP VIRUS 12-25 TARGETS: CPT

## 2023-02-10 PROCEDURE — 87637 SARSCOV2&INF A&B&RSV AMP PRB: CPT

## 2023-02-10 PROCEDURE — 99222 1ST HOSP IP/OBS MODERATE 55: CPT

## 2023-02-10 PROCEDURE — 83605 ASSAY OF LACTIC ACID: CPT

## 2023-02-10 PROCEDURE — 81003 URINALYSIS AUTO W/O SCOPE: CPT

## 2023-02-10 PROCEDURE — 84443 ASSAY THYROID STIM HORMONE: CPT

## 2023-02-10 PROCEDURE — 87486 CHLMYD PNEUM DNA AMP PROBE: CPT

## 2023-02-10 PROCEDURE — 99231 SBSQ HOSP IP/OBS SF/LOW 25: CPT | Performed by: PSYCHIATRY & NEUROLOGY

## 2023-02-10 PROCEDURE — 85025 COMPLETE CBC W/AUTO DIFF WBC: CPT

## 2023-02-10 PROCEDURE — 84484 ASSAY OF TROPONIN QUANT: CPT

## 2023-02-10 PROCEDURE — 36415 COLL VENOUS BLD VENIPUNCTURE: CPT

## 2023-02-10 PROCEDURE — 93005 ELECTROCARDIOGRAM TRACING: CPT

## 2023-02-10 PROCEDURE — 80053 COMPREHEN METABOLIC PANEL: CPT

## 2023-02-10 PROCEDURE — 124N000003 HC R&B MH SENIOR/ADOLESCENT

## 2023-02-10 PROCEDURE — 250N000013 HC RX MED GY IP 250 OP 250 PS 637: Performed by: PSYCHIATRY & NEUROLOGY

## 2023-02-10 PROCEDURE — 71046 X-RAY EXAM CHEST 2 VIEWS: CPT

## 2023-02-10 PROCEDURE — H2032 ACTIVITY THERAPY, PER 15 MIN: HCPCS

## 2023-02-10 PROCEDURE — 83735 ASSAY OF MAGNESIUM: CPT

## 2023-02-10 RX ADMIN — OLANZAPINE 15 MG: 15 TABLET, FILM COATED ORAL at 20:54

## 2023-02-10 ASSESSMENT — ACTIVITIES OF DAILY LIVING (ADL)
ADLS_ACUITY_SCORE: 32
HYGIENE/GROOMING: INDEPENDENT
ADLS_ACUITY_SCORE: 32
ADLS_ACUITY_SCORE: 32
ORAL_HYGIENE: INDEPENDENT
HYGIENE/GROOMING: INDEPENDENT
ADLS_ACUITY_SCORE: 32
ORAL_HYGIENE: INDEPENDENT
ADLS_ACUITY_SCORE: 32
DRESS: INDEPENDENT
ADLS_ACUITY_SCORE: 32
DRESS: INDEPENDENT
ADLS_ACUITY_SCORE: 32
LAUNDRY: UNABLE TO COMPLETE
ADLS_ACUITY_SCORE: 32

## 2023-02-10 NOTE — PROGRESS NOTES
Pt was an engaged participant in dance/movement therapy (DMT) using interpretive services for verbal interventions.  He was engaged in nonverbal processes with increased engagement over previous D/MT sessions.  He requested a specific music style and moved more readily to cumbia than anything presented in 3/4 time.  Pt stated when he discharges he plans to return to work as a  because he needs the money and enjoys the community of people he works with.       02/09/23 1115   Expressive Therapy   Therapy Type dance/movement   Minutes of Treatment 55

## 2023-02-10 NOTE — CONSULTS
[facility]  Consult Note - Hospitalist Service  Date of Admission:  12/30/2022  Consult Requested by: Paris Mccauley MD  Reason for Consult: Tachycardia    Assessment & Plan   Juan Ordoñez is a Guatemalan-speaking only 52 year old male admitted on 12/30/2022. He has a past medical history of schizophrenia, substance use (alcohol, cocaine, marijuana, meth) who was admitted on 12/30/22 after being found running into traffic, was questionably suicidal, but had also been experiencing auditory hallucinations. Thus, admitted to station 3[facility] for further monitoring and management for schizophrenia. Of note, patient has two charts due to inconsistencies with the spelling of patient's last name (Tiago vs Gurdeep).    Tachycardia  Dyspnea  Leukocytosis  Medicine consulted this AM for acute onset tachycardia. Pt notes that he has never had this before to his knowledge, and additionally experienced acute onset dyspnea last night, without a cough. No abd pain, nausea, vomiting, or urinary/bowel changes, or palpitations. This writer attempted to confirm tachycardia, but HR was 88 on exam, though documented as being 120-130s earlier, so either improving or transient, not clear. Lung sounds CTAB, no respiratory distress, and is afebrile and otherwise HDS, not hypotensive. Pt has no known past medical history, but is not an incredibly reliable historian given psychiatric comorbidities as seen in Psychiatry notes. DDx includes PE, MI, HF, Pneumonia vs other viral infection, sepsis, hyperthyroidism, anxiety/response to hallucinations, dehydration/orthostasis, iatrogenic. Not on AC, and no known hx of risk factors for DVT/PE, and Well's Criteria score 1.5 indicating 1.3% chance of PE, and DDimer negative so much less likely. EKG showed sinus tachy, no e/o ischemia, no captured arrhythmias, and Trop, BNP WNL so cardiac source less likely. No new medications or recent major  "adjustments (only had Zyprexa decreased from 20-->15mg 4 days ago), making iatrogenic source less likely. Mildly leukocytotic at 11.8 w/ neutrophil predominance (ANC 8.7), which does raise c/f infection vs stress margination. LA WNL, so systemic infection/sepsis, dehydration, or physiologic response to acute stressor less likely in the setting of being HDS, afebrile. CMP, Mag, TSH WNL, less likely thyroid or metabolic cause to explain symptoms. CXR showing no focal consolidations or e/o acute airspace disease/infection, UA negative, COVID/Influenza negative. At this time, not entirely clear etiology of tachycardia, dyspnea, and mild leukocytosis, but could certainly be stress margination/response to underlying psychiatric illness vs anxiety vs iatrogenic (though less likely to be medication related as no significant changes or initiation of new meds).  -Resp Viral Panel pending  -Orthostatics  -CBC in AM to monitor leukocytosis  -Continue to monitor    Medicine will continue to follow along peripherally for tachycardia, leukocytosis.  Recommendations relayed to primary team via this progress note.  Thank you for the opportunity to be involved in this patient's care.     The patient's care was discussed with the Bedside Nurse, Patient and Primary team    Clinically Significant Risk Factors                                Angel Luis Moser PA-C  Hospitalist Service  Securely message with [employer] (more info)  Text page via [employer] Paging/Directory   ______________________________________________________________________    Chief Complaint   \"I feel better than yesterday\"    History is obtained from the patient, interview conducted in Ugandan without  being needed.    History of Present Illness   Juan Ordoñez is a 52 year old male who is seen in his room. He notes that last night he developed sudden onset shortness of breath and \"a little\" chest pain. The chest pain is primarily in the center of his chest, " "and does not complain of radiation elsewhere. Additionally, he elaborates that he was resting last night when he had this sudden onset shortness of breath. He denies a cough, abdominal pain, nausea, vomiting, urinary or bowel changes, leg pain, headaches, vision changes. He otherwise feels \"ok\".    Past Medical History    No past medical history on file.    Past Surgical History   No past surgical history on file.    Medications   I have reviewed this patient's current medications     Review of Systems    The 10 point Review of Systems is negative other than noted in the HPI or here.      Physical Exam   Vital Signs: Temp: 98.6  F (37  C) Temp src: Oral BP: (!) 130/91 Pulse: (!) 122   Resp: 18 SpO2: 96 % O2 Device: None (Room air)    Weight: 172 lbs 9.92 oz    Constitutional: awake, alert, cooperative, no apparent distress, and appears stated age  Eyes: lids and lashes normal, sclera clear and conjunctiva normal  ENT: normocepalic, without obvious abnormality  Respiratory: No increased work of breathing, good air exchange, clear to auscultation bilaterally, no crackles or wheezing, no stridor.  Cardiovascular: regular rate and rhythm, normal S1 and S2, no S3, no S4 and no murmur noted  GI: normal bowel sounds, soft, non-distended and non-tender  Skin: no bruising or bleeding, no redness, warmth, or swelling, no rashes and no lesions on visualized skin.  Musculoskeletal: no lower extremity pitting edema present  Neurologic: Moving all extremities equally and spontaneously. No obvious focal neuro deficits.  Neuropsychiatric: General: normal, calm and normal eye contact  Level of consciousness: alert / normal  Affect: normal and pleasant    Medical Decision Making       60 MINUTES SPENT BY ME on the date of service doing chart review, history, exam, documentation & further activities per the note.      Data     I have personally reviewed the following data over the past 24 hrs:    11.8 (H)  \   15.0   / 286     139 102 " 12.6 /  136 (H)   4.3 26 0.62 (L) \       ALT: 40 AST: 30 AP: 100 TBILI: 0.2   ALB: 4.4 TOT PROTEIN: 7.6 LIPASE: N/A       Trop: <6 BNP: N/A       TSH: 0.77 T4: N/A A1C: N/A       Procal: N/A CRP: N/A Lactic Acid: 1.5         Imaging results reviewed over the past 24 hrs:   No results found for this or any previous visit (from the past 24 hour(s)).  Recent Labs   Lab 02/10/23  1114   WBC 11.8*   HGB 15.0   MCV 86         POTASSIUM 4.3   CHLORIDE 102   CO2 26   BUN 12.6   CR 0.62*   ANIONGAP 11   JENN 9.4   *   ALBUMIN 4.4   PROTTOTAL 7.6   BILITOTAL 0.2   ALKPHOS 100   ALT 40   AST 30

## 2023-02-10 NOTE — PROGRESS NOTES
"PSYCHIATRY  PROGRESS NOTE     DATE OF SERVICE   02/10/2023        CHIEF COMPLAINT   \" I am fine\".     SUBJECTIVE   Nursing reports:   Pt assessed with Nauruan speeking  via phone. Pt denied SI/SIB/HI, endorsed AH as \"the same\". Pt is intermittently out in then milieu, socially withdrawn. Pt is eating and drinking adequately, medication compliant and denies any physical concerns this shift.         /84 (BP Location: Right arm, Patient Position: Sitting)   Pulse 80   Temp 97.9  F (36.6  C) (Temporal)   Resp 16   Ht 1.626 m (5' 4\")   Wt 78.3 kg (172 lb 9.9 oz)   SpO2 98%   BMI 29.63 kg/m             Patient has been discussed with the  earlier today.    Called and left a message for Khemp at "[employer] admissions this morning. Also followed up with the following email:  Good morning Khamp,  I just left you a voicemail. I heard back from our finance department saying that it will not be possible or allowed to release those information as the coverage is already in place, it is not as if he does not have coverage.  The number for [facility] for verification of coverage is:  [phone]  His Emergency Medical Assistance is: [id number]  His Case Number with [facility] is: [id number].     I hope these clarifications are adequate. Also, if you would like to specify exactly what information you need that is clinical, I can provide those. If you would also like to speak with the patient with an , I can facilitate that and we do provide interpreters. We are afraid he is having to stay too long here and this issue is keeping him. It is not that he does not have coverage, he does have coverage and the [facility] or someone has to have a way to verify that.     Please let me know if any of the options I have provided above would work.     Wishing you a great day.  Yosef.       OBJECTIVE   Patient was seen and evaluated in the day area by himself, this was a " "face-to-face evaluation.  Interview was conducted in Georgian by this writer.  Nursing staff informed me this morning that the patient presented with tachycardia with a heart rate between 120-140.  I did place an urgent medical consult and order an EKG that came back abnormal.  Medical staff came to visit the patient shortly after I placed the consult and ordered more blood work and a chest x-ray.    During my evaluation with the patient he stated that he is not experiencing any chest pain, shortness of breath, nausea or vomiting or any other discomfort.  Patient stated that he is feeling fine and if it was not because the nurse check his vitals he would not have noticed that his heart rate was elevated.  He is continuing to denied all psychotic symptoms and has been [place] for safety.  Patient is aware that the medical team is ordering some blood work and test and is in agreement with this.    Recent Labs   Lab 02/10/23  1114   WBC 11.8*   HGB 15.0   MCV 86         POTASSIUM 4.3   CHLORIDE 102   CO2 26   BUN 12.6   CR 0.62*   ANIONGAP 11   JENN 9.4   *   ALBUMIN 4.4   PROTTOTAL 7.6   BILITOTAL 0.2   ALKPHOS 100   ALT 40   AST 30          MEDICATIONS   Medications:  Scheduled Meds:    OLANZapine  15 mg Oral At Bedtime     Continuous Infusions:  PRN Meds:.acetaminophen, alum & mag hydroxide-simethicone, hydrOXYzine, ibuprofen, OLANZapine **OR** OLANZapine, senna-docusate, traZODone    Medication adherence issues: MS Med Adherence Y/N: Yes, Hospitalization  Medication side effects: MEDICATION SIDE EFFECTS: no side effects reported  Benefit: Yes / No: Yes       ROS   A comprehensive review of systems was negative.       MENTAL STATUS EXAM   Vitals: BP (!) 130/91 (BP Location: Right arm, Patient Position: Sitting, Cuff Size: Adult Regular)   Pulse (!) 122   Temp 98.6  F (37  C) (Oral)   Resp 18   Ht 1.626 m (5' 4\")   Wt 78.3 kg (172 lb 9.9 oz)   SpO2 96%   BMI 29.63 kg/m      Appearance:  " "No apparent distress  Mood: \"I am fine\"  Affect: full range  was congruent to speech  Suicidal Ideation: PRESENT / ABSENT: absent   Homicidal Ideation: PRESENT / ABSENT: absent   Thought process: [place]   Thought content: Patient stated that auditory hallucinations are the same   fund of Knowledge: Below average  Attention/Concentration: Fair  Language ability:  Intact  Memory:  Immediate recall impaired, Short-term memory impaired and Long-term memory intact  Insight:  fair.  Judgement: fair  Orientation: Yes, x4  Psychomotor Behavior: normal or unremarkable    Muscle Strength and Tone: MuscleStrength: Normal  Gait and Station: Normal       LABS   personally reviewed.   Results for orders placed or performed during the hospital encounter of 12/30/22 (from the past 24 hour(s))   EKG 12-lead, complete   Result Value Ref Range    Systolic Blood Pressure  mmHg    Diastolic Blood Pressure  mmHg    Ventricular Rate 119 BPM    Atrial Rate 119 BPM    UT Interval 146 ms    QRS Duration 78 ms     ms    QTc 450 ms    P Axis 61 degrees    R AXIS -24 degrees    T Axis 9 degrees    Interpretation ECG       Sinus tachycardia  Possible Anterior infarct , age undetermined  Abnormal ECG  When compared with ECG of 10-GAGANDEEP-2023 07:56,  Vent. rate has increased BY  46 BPM     Comprehensive metabolic panel   Result Value Ref Range    Sodium 139 136 - 145 mmol/L    Potassium 4.3 3.4 - 5.3 mmol/L    Chloride 102 98 - 107 mmol/L    Carbon Dioxide (CO2) 26 22 - 29 mmol/L    Anion Gap 11 7 - 15 mmol/L    Urea Nitrogen 12.6 6.0 - 20.0 mg/dL    Creatinine 0.62 (L) 0.67 - 1.17 mg/dL    Calcium 9.4 8.6 - 10.0 mg/dL    Glucose 136 (H) 70 - 99 mg/dL    Alkaline Phosphatase 100 40 - 129 U/L    AST 30 10 - 50 U/L    ALT 40 10 - 50 U/L    Protein Total 7.6 6.4 - 8.3 g/dL    Albumin 4.4 3.5 - 5.2 g/dL    Bilirubin Total 0.2 <=1.2 mg/dL    GFR Estimate >90 >60 mL/min/1.73m2   CBC with Platelets & Differential    Narrative    The following orders " were created for panel order CBC with Platelets & Differential.  Procedure                               Abnormality         Status                     ---------                               -----------         ------                     CBC with platelets and d...[[id number]]  Abnormal            Final result                 Please view results for these tests on the individual orders.   Lactic acid whole blood   Result Value Ref Range    Lactic Acid 1.5 0.7 - 2.0 mmol/L   Troponin T, High Sensitivity   Result Value Ref Range    Troponin T, High Sensitivity <6 <=22 ng/L   Magnesium   Result Value Ref Range    Magnesium 2.0 1.7 - 2.3 mg/dL   TSH with free T4 reflex   Result Value Ref Range    TSH 0.77 0.30 - 4.20 uIU/mL   CBC with platelets and differential   Result Value Ref Range    WBC Count 11.8 (H) 4.0 - 11.0 10e3/uL    RBC Count 5.24 4.40 - 5.90 10e6/uL    Hemoglobin 15.0 13.3 - 17.7 g/dL    Hematocrit 44.9 40.0 - 53.0 %    MCV 86 78 - 100 fL    MCH 28.6 26.5 - 33.0 pg    MCHC 33.4 31.5 - 36.5 g/dL    RDW 12.8 10.0 - 15.0 %    Platelet Count 286 150 - 450 10e3/uL    % Neutrophils 71 %    % Lymphocytes 14 %    % Monocytes 11 %    % Eosinophils 3 %    % Basophils 1 %    % Immature Granulocytes 0 %    NRBCs per 100 WBC 0 <1 /100    Absolute Neutrophils 8.4 (H) 1.6 - 8.3 10e3/uL    Absolute Lymphocytes 1.7 0.8 - 5.3 10e3/uL    Absolute Monocytes 1.3 0.0 - 1.3 10e3/uL    Absolute Eosinophils 0.4 0.0 - 0.7 10e3/uL    Absolute Basophils 0.1 0.0 - 0.2 10e3/uL    Absolute Immature Granulocytes 0.0 <=0.4 10e3/uL    Absolute NRBCs 0.0 10e3/uL   Nt probnp inpatient   Result Value Ref Range    N terminal Pro BNP Inpatient 149 0 - 900 pg/mL   D dimer quantitative   Result Value Ref Range    D-Dimer Quantitative 0.32 0.00 - 0.50 ug/mL FEU    Narrative    This D-dimer assay is intended for use in conjunction with a clinical pretest probability assessment model to exclude pulmonary embolism (PE) and deep venous thrombosis  (DVT) in outpatients suspected of PE or DVT. The cut-off value is 0.50 ug/mL FEU.   UA reflex to Microscopic and Culture    Specimen: Urine, Clean Catch   Result Value Ref Range    Color Urine Light Yellow Colorless, Straw, Light Yellow, Yellow    Appearance Urine Clear Clear    Glucose Urine 100 (A) Negative mg/dL    Bilirubin Urine Negative Negative    Ketones Urine Negative Negative mg/dL    Specific Gravity Urine 1.023 1.003 - 1.035    Blood Urine Negative Negative    pH Urine 6.5 5.0 - 7.0    Protein Albumin Urine Negative Negative mg/dL    Urobilinogen Urine Normal Normal, 2.0 mg/dL    Nitrite Urine Negative Negative    Leukocyte Esterase Urine Negative Negative    Narrative    Microscopic not indicated   Symptomatic Influenza A/B & SARS-CoV2 (COVID-19) Virus PCR Multiplex Nose    Specimen: Nose; Swab   Result Value Ref Range    Influenza A PCR Negative Negative    Influenza B PCR Negative Negative    RSV PCR Negative Negative    SARS CoV2 PCR Negative Negative    Narrative    Testing was performed using the Xpert Xpress CoV2/Flu/RSV Assay on the [employer] GeneXpert Instrument. This test should be ordered for the detection of SARS-CoV-2 and influenza viruses in individuals who meet clinical and/or epidemiological criteria. Test performance is unknown in asymptomatic patients. This test is for in vitro diagnostic use under the FDA EUA for laboratories certified under CLIA to perform high or moderate complexity testing. This test has not been FDA cleared or approved. A negative result does not rule out the presence of PCR inhibitors in the specimen or target RNA in concentration below the limit of detection for the assay. If only one viral target is positive but coinfection with multiple targets is suspected, the sample should be re-tested with another FDA cleared, approved, or authorized test, if coinfection would change clinical management. This test was validated by the [facility]. These  laboratories are certified under the Clinical Laboratory Improvement Amendments of 1988 (CLIA-88) as qualified to perform high complexity laboratory testing.   XR Chest 2 Views    Narrative    EXAM: XR CHEST 2 VIEWS  2/10/2023 1:30 PM      HISTORY: new onset tachycardia; r/o acute airspace disease vs pulm  edema vs other    COMPARISON: None.    FINDINGS: Two views of the chest. Trachea is midline. Cardiac  silhouette is borderline enlarged. Prominent central pulmonary  vasculature. Small left pleural effusion. Streaky perihilar and  bibasilar opacities. No focal consolidation. Small left pleural  effusion. The upper abdomen is unremarkable. Mild degenerative changes  of the visualized spine. No acute osseous abnormality.      Impression    IMPRESSION:   1. Prominent pulmonary vasculature with mild streaky perihilar and  bibasilar opacities, atelectasis versus edema.  2. Trace left pleural effusion.     Recent Labs   Lab 02/10/23  1114   WBC 11.8*   HGB 15.0   MCV 86         POTASSIUM 4.3   CHLORIDE 102   CO2 26   BUN 12.6   CR 0.62*   ANIONGAP 11   JENN 9.4   *   ALBUMIN 4.4   PROTTOTAL 7.6   BILITOTAL 0.2   ALKPHOS 100   ALT 40   AST 30     No results found for: PHENYTOIN, PHENOBARB, VALPROATE, CBMZ       DIAGNOSIS   Principal Problem:    Schizophrenia, schizoaffective, chronic with acute exacerbation (H)    Active Problem List:  Patient Active Problem List   Diagnosis     Schizophrenia, schizoaffective, chronic with acute exacerbation (H)          PLAN   1. Ongoing education given regarding diagnostic and treatment options with risks, benefits and alternatives and adequate verbalization of understanding.  2.  Medications      Zyprexa to 15 mg at bedtime     3.  Medical team to follow the patient as needed.  4.   coordinating a safe discharge plan with the patient.      Risk Assessment: [facility] RISK ASSESSMENT: Patient able to contract for safety    Coordination of Care:   Treatment  Plan reviewed and physician signed, Care discussed with Care/Treatment Team Members, Chart reviewed and Patient seen      Re-Certification I certify that the inpatient psychiatric facility services furnished since the previous certification were, and continue to be, medically necessary for, either, treatment which could reasonably be expected to improve the patient s condition or diagnostic study and that the hospital records indicate that the services furnished were, either, intensive treatment services, admission and related services necessary for diagnostic study, or equivalent services.     I certify that the patient continues to need, on a daily basis, active treatment furnished directly by or requiring the supervision of inpatient psychiatric facility personnel.   I estimate 14 days of hospitalization is necessary for proper treatment of the patient. My plans for post-hospital care for this patient are  TBD     Paris Mccauley MD    -     02/10/2023  -     3:07 PM    Total time 25 minutes with > 50%spent on coordination of cares and psycho-education.    This note was created with help of Dragon dictation system. Grammatical / typing errors are not intentional.    Paris Mccauley MD

## 2023-02-10 NOTE — PLAN OF CARE
Goal Outcome Evaluation:       Patient had an uninterrupted sleep for 10 hours the whole shift. No complaints made. No concerns raised.

## 2023-02-10 NOTE — PLAN OF CARE
Problem: Depression  Goal: Improved Mood  Outcome: Progressing   Goal Outcome Evaluation:    Plan of Care Reviewed With: patient          Patient calm and cooperative. Depressed/sad mood. Affect consistent with pt. Mood. Speech linear, logical. Patient denied anxiety, depression, SI, SIB. Continued to report AH. Patient had elevated HR this morning to 141 in rest. HR rechecked but still in 122. IM notified, /see note from 02/10 10:52AM/.

## 2023-02-10 NOTE — PLAN OF CARE
CTC:    Called and left a message for Chetemp at [employer] [facility] admissions this morning. Also followed up with the following email:  Good morning Pamela,  I just left you a voicemail. I heard back from our finance department saying that it will not be possible or allowed to release those information as the coverage is already in place, it is not as if he does not have coverage.  The number for [facility] for verification of coverage is:  [phone]  His Emergency Medical Assistance is: [id number]  His Case Number with [facility] is: [id number].    I hope these clarifications are adequate. Also, if you would like to specify exactly what information you need that is clinical, I can provide those. If you would also like to speak with the patient with an , I can facilitate that and we do provide interpreters. We are afraid he is having to stay too long here and this issue is keeping him. It is not that he does not have coverage, he does have coverage and the county or someone has to have a way to verify that.    Please let me know if any of the options I have provided above would work.    Wishing you a great day.  Yosef.

## 2023-02-10 NOTE — PROGRESS NOTES
"PSYCHIATRY  PROGRESS NOTE     DATE OF SERVICE   02/09/2023        CHIEF COMPLAINT   \" I am okay\".     SUBJECTIVE   Nursing reports:  Patient is alert and oriented Bangladeshi speaking only, is calm pleasant and cooperative with cares. With the  present patient denied all mental health symptoms, reported auditory hallucinations are still there, he can hear them from the back telling not to stop at one place. Patient reports sleeping good through the night, appetite is good, eating and drinking adequately, is independent with ADL's , attends group therapy     Patient has been discussed with the  earlier today.    CTC: Received a message from [employer] Reporting that they need information on pt's finances. Have sent a question to [employer]. Awaiting response.       OBJECTIVE   Patient was seen and evaluated in the day area by himself, this was a face-to-face evaluation.  Interview was conducted in Lao by this writer. Patient continues to report feeling stable and denies any issues during the assessment. Patient did ask for an update about his discharge.  I informed the patient that we are still waiting for [employer] to give us an answer.  The  is going to be meeting with him later on today.  Patient continues to agree to stay in the hospital voluntarily.    No lab results found in last 7 days.       MEDICATIONS   Medications:  Scheduled Meds:    OLANZapine  15 mg Oral At Bedtime     Continuous Infusions:  PRN Meds:.acetaminophen, alum & mag hydroxide-simethicone, hydrOXYzine, ibuprofen, OLANZapine **OR** OLANZapine, senna-docusate, traZODone    Medication adherence issues: MS Med Adherence Y/N: Yes, Hospitalization  Medication side effects: MEDICATION SIDE EFFECTS: no side effects reported  Benefit: Yes / No: Yes       ROS   A comprehensive review of systems was negative.       MENTAL STATUS EXAM   Vitals: /84 (BP Location: Right arm, Patient Position: " "Sitting)   Pulse 80   Temp 97.9  F (36.6  C) (Temporal)   Resp 16   Ht 1.626 m (5' 4\")   Wt 78.3 kg (172 lb 9.9 oz)   SpO2 98%   BMI 29.63 kg/m      Same as last visit  Appearance:  No apparent distress  Mood: \"I am okay\"  Affect: full range  was congruent to speech  Suicidal Ideation: PRESENT / ABSENT: absent   Homicidal Ideation: PRESENT / ABSENT: absent   Thought process: [place]   Thought content: Denied hallucinations at the moment of the assessment  Fund of Knowledge: Below average  Attention/Concentration: Fair  Language ability:  Intact  Memory:  Immediate recall impaired, Short-term memory impaired and Long-term memory intact  Insight:  fair.  Judgement: fair  Orientation: Yes, x4  Psychomotor Behavior: normal or unremarkable    Muscle Strength and Tone: MuscleStrength: Normal  Gait and Station: Normal       LABS   personally reviewed.   No results found for this or any previous visit (from the past 24 hour(s)).  No lab results found in last 7 days.  No results found for: PHENYTOIN, PHENOBARB, VALPROATE, CBMZ       DIAGNOSIS   Principal Problem:    Schizophrenia, schizoaffective, chronic with acute exacerbation (H)    Active Problem List:  Patient Active Problem List   Diagnosis     Schizophrenia, schizoaffective, chronic with acute exacerbation (H)          PLAN   1. Ongoing education given regarding diagnostic and treatment options with risks, benefits and alternatives and adequate verbalization of understanding.  2.  Medications      Zyprexa to 15 mg at bedtime     3.  Medical team to follow the patient as needed.  4.   coordinating a safe discharge plan with the patient.      Risk Assessment: [facility] RISK ASSESSMENT: Patient able to contract for safety    Coordination of Care:   Treatment Plan reviewed and physician signed, Care discussed with Care/Treatment Team Members, Chart reviewed and Patient seen      Re-Certification I certify that the inpatient psychiatric facility services " furnished since the previous certification were, and continue to be, medically necessary for, either, treatment which could reasonably be expected to improve the patient s condition or diagnostic study and that the hospital records indicate that the services furnished were, either, intensive treatment services, admission and related services necessary for diagnostic study, or equivalent services.     I certify that the patient continues to need, on a daily basis, active treatment furnished directly by or requiring the supervision of inpatient psychiatric facility personnel.   I estimate 14 days of hospitalization is necessary for proper treatment of the patient. My plans for post-hospital care for this patient are  TBD     Paris Mccauley MD    -     02/09/2023  -     8:19 PM    Total time 25 minutes with > 50%spent on coordination of cares and psycho-education.    This note was created with help of Dragon dictation system. Grammatical / typing errors are not intentional.    Paris Mccauley MD

## 2023-02-10 NOTE — PLAN OF CARE
"  Problem: Psychotic Signs/Symptoms  Goal: Optimal Cognitive Function (Psychotic Signs/Symptoms)  Outcome: Progressing  Intervention: Support and Promote Cognitive Ability  Recent Flowsheet Documentation  Taken 2/9/2023 1950 by Zulay Cruz RN  Trust Relationship/Rapport:    care explained    choices provided    emotional support provided    empathic listening provided    questions answered    questions encouraged    reassurance provided    thoughts/feelings acknowledged   Goal Outcome Evaluation:    Plan of Care Reviewed With: patient        Pt assessed with Bulgarian speeking  via phone. Pt denied SI/SIB/HI, endorsed AH as \"the same\". Pt is intermittently out in then milieu, socially withdrawn. Pt is eating and drinking adequately, medication compliant and denies any physical concerns this shift.        /84 (BP Location: Right arm, Patient Position: Sitting)   Pulse 80   Temp 97.9  F (36.6  C) (Temporal)   Resp 16   Ht 1.626 m (5' 4\")   Wt 78.3 kg (172 lb 9.9 oz)   SpO2 98%   BMI 29.63 kg/m          "

## 2023-02-11 LAB
BASOPHILS # BLD AUTO: 0.1 10E3/UL (ref 0–0.2)
BASOPHILS NFR BLD AUTO: 1 %
EOSINOPHIL # BLD AUTO: 0.6 10E3/UL (ref 0–0.7)
EOSINOPHIL NFR BLD AUTO: 8 %
ERYTHROCYTE [DISTWIDTH] IN BLOOD BY AUTOMATED COUNT: 13 % (ref 10–15)
HCT VFR BLD AUTO: 43.9 % (ref 40–53)
HGB BLD-MCNC: 14.4 G/DL (ref 13.3–17.7)
IMM GRANULOCYTES # BLD: 0 10E3/UL
IMM GRANULOCYTES NFR BLD: 0 %
LYMPHOCYTES # BLD AUTO: 1.9 10E3/UL (ref 0.8–5.3)
LYMPHOCYTES NFR BLD AUTO: 24 %
MCH RBC QN AUTO: 29 PG (ref 26.5–33)
MCHC RBC AUTO-ENTMCNC: 32.8 G/DL (ref 31.5–36.5)
MCV RBC AUTO: 88 FL (ref 78–100)
MONOCYTES # BLD AUTO: 0.8 10E3/UL (ref 0–1.3)
MONOCYTES NFR BLD AUTO: 10 %
NEUTROPHILS # BLD AUTO: 4.6 10E3/UL (ref 1.6–8.3)
NEUTROPHILS NFR BLD AUTO: 57 %
NRBC # BLD AUTO: 0 10E3/UL
NRBC BLD AUTO-RTO: 0 /100
PLATELET # BLD AUTO: 258 10E3/UL (ref 150–450)
RBC # BLD AUTO: 4.97 10E6/UL (ref 4.4–5.9)
WBC # BLD AUTO: 8 10E3/UL (ref 4–11)

## 2023-02-11 PROCEDURE — 36415 COLL VENOUS BLD VENIPUNCTURE: CPT

## 2023-02-11 PROCEDURE — 124N000003 HC R&B MH SENIOR/ADOLESCENT

## 2023-02-11 PROCEDURE — 250N000013 HC RX MED GY IP 250 OP 250 PS 637: Performed by: PSYCHIATRY & NEUROLOGY

## 2023-02-11 PROCEDURE — 85025 COMPLETE CBC W/AUTO DIFF WBC: CPT

## 2023-02-11 RX ADMIN — OLANZAPINE 15 MG: 15 TABLET, FILM COATED ORAL at 20:31

## 2023-02-11 ASSESSMENT — ACTIVITIES OF DAILY LIVING (ADL)
ADLS_ACUITY_SCORE: 32
ORAL_HYGIENE: INDEPENDENT
ADLS_ACUITY_SCORE: 32
DRESS: INDEPENDENT
ADLS_ACUITY_SCORE: 32
HYGIENE/GROOMING: INDEPENDENT
ADLS_ACUITY_SCORE: 32
ADLS_ACUITY_SCORE: 32

## 2023-02-11 NOTE — PLAN OF CARE
"  Problem: Psychotic Signs/Symptoms  Goal: Optimal Cognitive Function (Psychotic Signs/Symptoms)  Outcome: Progressing  Flowsheets (Taken 2/11/2023 1316)  Mutually Determined Action Steps (Optimal Cognitive Function):    follows step-by-step instructions    participates in problem resolution    remains focused during activity   Goal Outcome Evaluation:    Plan of Care Reviewed With: patient          Patient presents as calm, cooperative and pleasant upon approach.   He reported feeling \"Fine\", denied: depression, anxiety, SI.   Continues to report baseline AH-  negative voices, inside his head, not command at this time.   No new medical or psychiatric concerns noted or reported this shift.   VS remained WDL.     /66   Pulse 94   Temp 97.1  F (36.2  C) (Temporal)   Resp 16   Ht 1.626 m (5' 4\")   Wt 78.3 kg (172 lb 9.9 oz)   SpO2 97%   BMI 29.63 kg/m            "

## 2023-02-11 NOTE — PLAN OF CARE
Goal Outcome Evaluation:    Plan of Care Reviewed With: patient          Pt calm, pleasant, cooperative. Eye contact and affect WDL. Denies depression, anxiety, suicidal ideation. Continues to report faint auditory hallucinations which tell him not to stay in one place. Med-compliant. Appetite, sleep, hygiene good. Pulse 107 taken with machine, 92 on palpation (L brachial). Later, with machine: 102 sitting, 109 standing; 88 on palpation. Denies physical complaints, pain or side effects. Total intake this shift: 1200 ml. Continue with current treatment plan and recommendations. Continue to monitor and reassess symptoms. Monitor response to medications. Monitor progress towards treatment goals. Encourage groups and participation.

## 2023-02-11 NOTE — PROGRESS NOTES
02/10/23 1900   Group Therapy Session   Time Session Began 2000   Time Session Ended 2045   Total Time (minutes) 38   Total # Attendees 2-3   Group Type expressive therapy   Group Topic Covered balanced lifestyle;relaxation techniques   Group Session Detail Evening Relaxation   Patient Response/Contribution cooperative with task   Patient Participation Detail Cooperatively engaged in Evening Music Relaxation group to decrease anxiety and promote sleep.  Juan appeared in good spirits.  He participated more when the  arrived partway through session.  He was able to express how a song he chose was about two people with different futures coming together.  His affect was pleasant and polite.

## 2023-02-11 NOTE — PLAN OF CARE
Goal Outcome Evaluation:       Patient slept well the whole night for 9.25 hours. No complaints made. No behavioral issues noted.

## 2023-02-11 NOTE — PROVIDER NOTIFICATION
02/11/23 1642   Sitting Orthostatic BP   Sitting Orthostatic /65   Sitting Orthostatic Pulse 118 bpm   Standing Orthostatic BP   Standing Orthostatic /74   Standing Orthostatic Pulse 121 bpm     IM notified. No changes unless pt symptomatic.

## 2023-02-12 PROCEDURE — 250N000013 HC RX MED GY IP 250 OP 250 PS 637: Performed by: PSYCHIATRY & NEUROLOGY

## 2023-02-12 PROCEDURE — 124N000003 HC R&B MH SENIOR/ADOLESCENT

## 2023-02-12 RX ADMIN — OLANZAPINE 15 MG: 15 TABLET, FILM COATED ORAL at 20:52

## 2023-02-12 ASSESSMENT — ACTIVITIES OF DAILY LIVING (ADL)
ADLS_ACUITY_SCORE: 32
ORAL_HYGIENE: INDEPENDENT
ADLS_ACUITY_SCORE: 32
DRESS: INDEPENDENT
ADLS_ACUITY_SCORE: 32
HYGIENE/GROOMING: INDEPENDENT
ADLS_ACUITY_SCORE: 32

## 2023-02-12 NOTE — PLAN OF CARE
Goal Outcome Evaluation:    Plan of Care Reviewed With: patient          Pt calm, pleasant, cooperative. Eye contact and affect WDL. Denies depression, anxiety, suicidal ideation. Continues to report faint auditory hallucinations which tell him not to stay in one place. Med-compliant. Appetite, sleep, hygiene good. Pulse elevated, pt asymptomatic, IM informed (see my other note this shift). Continue with current treatment plan and recommendations. Continue to monitor and reassess symptoms. Monitor response to medications. Monitor progress towards treatment goals. Encourage groups and participation

## 2023-02-12 NOTE — PLAN OF CARE
Occupational Therapy Group Note:       02/12/23 1454   Group Therapy Session   Group Attendance attended group session   Time Session Began 1015   Time Session Ended 1110   Total Time (minutes) 35 (no charge)   Total # Attendees 2   Group Type task skill   Group Topic Covered coping skills/lifestyle management;emotions/expression;structured socialization   Group Session Detail OT Clinic Group   Patient Response/Contribution cooperative with task   Patient Participation Detail Pt actively participated in occupational therapy clinic to facilitate coping skill exploration, creative expression within personally meaningful activities, and clinical observation of social, cognitive, and kinesthetic performance skills. Patient entered group late. Interpretor present throughout session. Writer inquired about patient's preferences for group structure; patient did not specify preference for any particular group. Pt response: independent to initiate, Aaron to gather materials, sequence, and adjust to workspace demands as needed. Demonstrated excellent focus, planning, and problem solving for selected scratch art task. Able to ask for assistance as needed, and appropriate with peers and staff. Patient was able to recall task that he had been working on; agreeable to continue work on task. Patient appeared very focused while working; no conversation initiated with writer, interpretor, or peers. Patient utilized scratch art tool appropriately and safely and readily returned to writer at end of group. Good focus and attention to detail noted in craftsmanship. Patient assisted with clean-up at end of group. Calm, cooperative, engaged.

## 2023-02-12 NOTE — PLAN OF CARE
Problem: Plan of Care - These are the overarching goals to be used throughout the patient stay.    Goal: Optimal Comfort and Wellbeing  Outcome: Progressing   Goal Outcome Evaluation:    Plan of Care Reviewed With: patient                 Patient hd an uneventful shift. He remains calm, cooperative and pleasant upon approach. Denies depression, anxiety, SI/SIB. Affect consistent with patients mood. Continues to report AH at baseline, patient states medications are working.   Appetite and sleep are good, keeps himself clean and well groomed.

## 2023-02-12 NOTE — PLAN OF CARE
Goal Outcome Evaluation:       Patient slept soundly for 11.5 hours the whole night. Nothing unusual noted. No behavioral issues exhibited.

## 2023-02-13 LAB
ATRIAL RATE - MUSE: 119 BPM
DIASTOLIC BLOOD PRESSURE - MUSE: NORMAL MMHG
INTERPRETATION ECG - MUSE: NORMAL
P AXIS - MUSE: 61 DEGREES
PR INTERVAL - MUSE: 146 MS
QRS DURATION - MUSE: 78 MS
QT - MUSE: 320 MS
QTC - MUSE: 450 MS
R AXIS - MUSE: -24 DEGREES
SYSTOLIC BLOOD PRESSURE - MUSE: NORMAL MMHG
T AXIS - MUSE: 9 DEGREES
VENTRICULAR RATE- MUSE: 119 BPM

## 2023-02-13 PROCEDURE — G0177 OPPS/PHP; TRAIN & EDUC SERV: HCPCS

## 2023-02-13 PROCEDURE — 99231 SBSQ HOSP IP/OBS SF/LOW 25: CPT | Performed by: PSYCHIATRY & NEUROLOGY

## 2023-02-13 PROCEDURE — 250N000013 HC RX MED GY IP 250 OP 250 PS 637: Performed by: PSYCHIATRY & NEUROLOGY

## 2023-02-13 PROCEDURE — 124N000003 HC R&B MH SENIOR/ADOLESCENT

## 2023-02-13 RX ADMIN — OLANZAPINE 15 MG: 15 TABLET, FILM COATED ORAL at 19:17

## 2023-02-13 ASSESSMENT — ACTIVITIES OF DAILY LIVING (ADL)
DRESS: INDEPENDENT
ADLS_ACUITY_SCORE: 32
ORAL_HYGIENE: INDEPENDENT
ADLS_ACUITY_SCORE: 32
DRESS: INDEPENDENT
ADLS_ACUITY_SCORE: 32
LAUNDRY: WITH SUPERVISION
ADLS_ACUITY_SCORE: 32
LAUNDRY: UNABLE TO COMPLETE
HYGIENE/GROOMING: INDEPENDENT
ORAL_HYGIENE: INDEPENDENT
ADLS_ACUITY_SCORE: 32
HYGIENE/GROOMING: INDEPENDENT
ADLS_ACUITY_SCORE: 32

## 2023-02-13 NOTE — PLAN OF CARE
02/13/23 1517   Group Therapy Session   Group Attendance attended group session   Time Session Began 1115   Time Session Ended 1200   Total Time (minutes) 45   Total # Attendees 2   Group Type life skill;recreation;task skill   Group Topic Covered cognitive therapy techniques;coping skills/lifestyle management;problem-solving   Group Session Detail OT Wellness Group-Cognitive Wellness   Patient Response/Contribution cooperative with task;organized   Patient Participation Detail Pt attended group with  present. pt was independent with hands on task.pt readily engaged in group activity and required no repetition for instructions

## 2023-02-13 NOTE — PLAN OF CARE
02/13/23 1612   Group Therapy Session   Group Attendance attended group session   Time Session Began 1300   Time Session Ended 1400   Total Time (minutes) 60   Total # Attendees 2-3   Group Type life skill;recreation;task skill   Group Topic Covered coping skills/lifestyle management;problem-solving;structured socialization;balanced lifestyle;cognitive activities   Group Session Detail OT Topic Group-Tess's positive affirmations   Patient Response/Contribution cooperative with task;organized   Patient Participation Detail pt actively engaged in group activity.  was present. pt was independent with task and clean up. pt chose supplies methodically. pt worked neating and quietly for duration.

## 2023-02-13 NOTE — PLAN OF CARE
02/13/23 1452   Group Therapy Session   Group Attendance attended group session   Time Session Began 1015   Time Session Ended 1115   Total Time (minutes) 60   Total # Attendees 2   Group Type life skill;recreation;task skill   Group Topic Covered cognitive activities;leisure exploration/use of leisure time;problem-solving;coping skills/lifestyle management   Group Session Detail Occupational Therapy Clinic group to facilitate coping skill exploration, use of cognitive skills and problem solving, creative expression, clinical observation and facilitation of social, cognitive, and kinesthetic performance skills   Patient Response/Contribution cooperative with task;organized   Patient Participation Detail pt attended group with  present. pt chose to engage in familiar scratch art task today. pt was independent with choosing new design, sequencing task, and task clean up. pt worked quietly and methodically for duration. pt engaged in conversation providing short, brief answers when others initiated

## 2023-02-13 NOTE — PLAN OF CARE
Goal Outcome Evaluation:    Plan of Care Reviewed With: patient          Pt calm, pleasant, cooperative. Eye contact and affect WDL. Denies depression, anxiety, suicidal ideation. Continues to report faint auditory hallucinations which tell him not to stay in one place. Med-compliant. Appetite, sleep, hygiene good. Pulse elevated, 101. Total intake this shift 1170 ml. Continue with current treatment plan and recommendations. Continue to monitor and reassess symptoms. Monitor response to medications. Monitor progress towards treatment goals. Encourage groups and participation

## 2023-02-13 NOTE — PLAN OF CARE
"    CTC: Update from [facility]: Have sent him a message letting him know that the plan is to call [facility]    \"Hello,     I am being told by billing that if you cannot verify or tell me what was written in the document for the initial emergency MA document, that you will need to work with the client to have the emergency status removed so we can bill under regular MA. It would be similar to clients who have MA with UN eligibility type since  does not pay for IRTS. Although they have coverage, billing is unable to proceed due to billing codes as I mentioned earlier that we would have to bill what the Emergency MA was approved for. You should also know that at this time, we do not have immediate openings and looking at a 6-8 week waitlist. Please let me know how you want to proceed. Thanks.\"     Dedrick Baca  IRTS   [email or web address]  [email or web address]  My Direct Number: [phone]  Fax: [phone]  [facility] - IRTS Intake Line - [phone]    "

## 2023-02-13 NOTE — PLAN OF CARE
Problem: Plan of Care - These are the overarching goals to be used throughout the patient stay.    Goal: Optimal Comfort and Wellbeing  Outcome: Progressing   Goal Outcome Evaluation:    Plan of Care Reviewed With: patient               Patient had an uneventful shift.   Patient is calm and cooperative.   Denied anxiety, depression, SI, SIB.   AH are at baseline.

## 2023-02-13 NOTE — PROGRESS NOTES
"PSYCHIATRY  PROGRESS NOTE     DATE OF SERVICE   02/13/2023        CHIEF COMPLAINT   \" I am fine\".     SUBJECTIVE   Nursing reports:     Patient had an uneventful shift.   Patient is calm and cooperative.   Denied anxiety, depression, SI, SIB.   AH are at baseline.      Patient has been discussed with the  earlier today.    CTC: Called and spoke with [facility], following the feedback from [employer].  Staff was able to locate this pt's profile and connected writer to \"the unit that has his case\". Writer left a voicemail and requested a call back.       OBJECTIVE   Patient was seen and evaluated in the day area by himself, this was a face-to-face evaluation.  Interview was conducted in [place] by this writer.  Patient presented as calm, pleasant and cooperative during the assessment.  Patient seemed to be in good spirits and he was able to respond to humor.  Patient stated that he is okay with staying in the hospital and is not in any hurry to leave.  I did discuss with the patient the information that it has been provided by the  about the situation with his medical assistance.  Patient said that he is willing to wait in the hospital in order to get the situation fixed.  At this time he denies any other issues or concerns and has continued to contract for safety.    Recent Labs   Lab 02/11/23  0827 02/10/23  1114   WBC 8.0 11.8*   HGB 14.4 15.0   MCV 88 86    286   NA  --  139   POTASSIUM  --  4.3   CHLORIDE  --  102   CO2  --  26   BUN  --  12.6   CR  --  0.62*   ANIONGAP  --  11   JENN  --  9.4   GLC  --  136*   ALBUMIN  --  4.4   PROTTOTAL  --  7.6   BILITOTAL  --  0.2   ALKPHOS  --  100   ALT  --  40   AST  --  30          MEDICATIONS   Medications:  Scheduled Meds:    OLANZapine  15 mg Oral At Bedtime     Continuous Infusions:  PRN Meds:.acetaminophen, alum & mag hydroxide-simethicone, hydrOXYzine, ibuprofen, OLANZapine **OR** OLANZapine, " "senna-docusate, traZODone    Medication adherence issues: MS Med Adherence Y/N: Yes, Hospitalization  Medication side effects: MEDICATION SIDE EFFECTS: no side effects reported  Benefit: Yes / No: Yes       ROS   A comprehensive review of systems was negative.       MENTAL STATUS EXAM   Vitals: /86   Pulse 76   Temp 96.9  F (36.1  C) (Temporal)   Resp 18   Ht 1.626 m (5' 4\")   Wt 79.1 kg (174 lb 6.1 oz)   SpO2 98%   BMI 29.93 kg/m      Appearance:  No apparent distress  Mood: \"I am doing good\"  Affect: full range and bright was congruent to speech  Suicidal Ideation: PRESENT / ABSENT: absent   Homicidal Ideation: PRESENT / ABSENT: absent   Thought process: [place]   Thought content: Denied having any auditory hallucinations at the moment of the assessment.  fund of Knowledge: Below average  Attention/Concentration: Fair  Language ability:  Intact  Memory:  Immediate recall impaired, Short-term memory impaired and Long-term memory intact  Insight:  fair.  Judgement: fair  Orientation: Yes, x4  Psychomotor Behavior: normal or unremarkable    Muscle Strength and Tone: MuscleStrength: Normal  Gait and Station: Normal       LABS   personally reviewed.   No results found for this or any previous visit (from the past 24 hour(s)).  Recent Labs   Lab 02/11/23  0827 02/10/23  1114   WBC 8.0 11.8*   HGB 14.4 15.0   MCV 88 86    286   NA  --  139   POTASSIUM  --  4.3   CHLORIDE  --  102   CO2  --  26   BUN  --  12.6   CR  --  0.62*   ANIONGAP  --  11   JENN  --  9.4   GLC  --  136*   ALBUMIN  --  4.4   PROTTOTAL  --  7.6   BILITOTAL  --  0.2   ALKPHOS  --  100   ALT  --  40   AST  --  30     No results found for: PHENYTOIN, PHENOBARB, VALPROATE, CBMZ       DIAGNOSIS   Principal Problem:    Schizophrenia, schizoaffective, chronic with acute exacerbation (H)    Active Problem List:  Patient Active Problem List   Diagnosis     Schizophrenia, schizoaffective, chronic with acute exacerbation (H)          PLAN "   1. Ongoing education given regarding diagnostic and treatment options with risks, benefits and alternatives and adequate verbalization of understanding.  2.  Medications      Zyprexa to 15 mg at bedtime     3.  Medical team to follow the patient as needed.  4.   coordinating a safe discharge plan with the patient.      Risk Assessment: [facility] RISK ASSESSMENT: Patient able to contract for safety    Coordination of Care:   Treatment Plan reviewed and physician signed, Care discussed with Care/Treatment Team Members, Chart reviewed and Patient seen      Re-Certification I certify that the inpatient psychiatric facility services furnished since the previous certification were, and continue to be, medically necessary for, either, treatment which could reasonably be expected to improve the patient s condition or diagnostic study and that the hospital records indicate that the services furnished were, either, intensive treatment services, admission and related services necessary for diagnostic study, or equivalent services.     I certify that the patient continues to need, on a daily basis, active treatment furnished directly by or requiring the supervision of inpatient psychiatric facility personnel.   I estimate 14 days of hospitalization is necessary for proper treatment of the patient. My plans for post-hospital care for this patient are  TBD     Paris Mccauley MD    -     02/13/2023  -     4:10 PM    Total time 25 minutes with > 50%spent on coordination of cares and psycho-education.    This note was created with help of Dragon dictation system. Grammatical / typing errors are not intentional.    Paris Mccauley MD

## 2023-02-13 NOTE — PLAN OF CARE
Goal Outcome Evaluation:       Patient slept soundly for 10 hours. No complaints made. Nothing unusual noted.

## 2023-02-13 NOTE — PLAN OF CARE
"CTC: Called and spoke with [facility], following the feedback from [employer].  Staff was able to locate this pt's profile and connected writer to \"the unit that has his case\". Writer left a voicemail and requested a call back.    "

## 2023-02-13 NOTE — PLAN OF CARE
"CTC:  Received a message from the admissions director at [employer] this morning stating as follows:  \"  Mor Navas,     I received your voicemail and have forwarded it to our billing team. I will let you know once I hear from them. Thank you.     Dedrick Baca  IRTS   [email or web address]  [email or web address]  My Direct Number: [phone]  Fax: [phone]  [facility] - IRTS Intake Line - [phone]\"    Plan: [facility] is reviewing pt's financial situation and will get back to writer regarding the next step. [facility] had indicated they had difficulty with pt's emergency medical assistance verification. The financial services department has been engaged. After hearing from [facility], the next possible discharge option might be crisis bed. Will discuss with team.  "

## 2023-02-13 NOTE — PROGRESS NOTES
"Brief Medicine Follow Up Note    Following up regarding leukocytosis, tachycardia.     Today's vital signs, medications, and nursing notes were reviewed. Labs reviewed most recent serum and urine laboratories.     /86   Pulse 76   Temp 96.9  F (36.1  C) (Temporal)   Resp 18   Ht 1.626 m (5' 4\")   Wt 79.1 kg (174 lb 6.1 oz)   SpO2 98%   BMI 29.93 kg/m      A/P:  Tachycardia, resolved  Leukocytosis, resolved  Medicine consulted on 2/10/23 for tachycardia, dyspnea, and found to have leukocytosis. Workup largely unremarkable. However, since then, these have all resolved and per RN notes, pt has not had any further complaints, feels medications are working. In discussion previously w/ patient, he had noted that he was experiencing faint auditory hallucinations which were troublesome to him and stressed him out, so this could partly explain his tachycardia if mounting emotional response to these internal stimuli. Leukocytosis has resolved and come back to WNL, VS showing HR has returned to WNL, with occasional spikes >100. Otherwise remains HDS. Afebrile.     Medicine will sign off. No further recommendations at this time.  Please feel free to reconsult if any new medical issues or concerns.  Thank you for the opportunity to care for this patient.     Angel Luis Moser PA-C  [facility]  Contact information available via [facility] Paging/Directory    "

## 2023-02-14 ENCOUNTER — OFFICE VISIT (OUTPATIENT)
Dept: INTERPRETER SERVICES | Facility: CLINIC | Age: 53
End: 2023-02-14
Payer: MEDICAID

## 2023-02-14 PROCEDURE — H2032 ACTIVITY THERAPY, PER 15 MIN: HCPCS

## 2023-02-14 PROCEDURE — G0177 OPPS/PHP; TRAIN & EDUC SERV: HCPCS

## 2023-02-14 PROCEDURE — 124N000003 HC R&B MH SENIOR/ADOLESCENT

## 2023-02-14 PROCEDURE — T1013 SIGN LANG/ORAL INTERPRETER: HCPCS | Mod: GT

## 2023-02-14 PROCEDURE — 250N000013 HC RX MED GY IP 250 OP 250 PS 637: Performed by: PSYCHIATRY & NEUROLOGY

## 2023-02-14 RX ADMIN — OLANZAPINE 15 MG: 15 TABLET, FILM COATED ORAL at 20:09

## 2023-02-14 ASSESSMENT — ACTIVITIES OF DAILY LIVING (ADL)
ADLS_ACUITY_SCORE: 32
DRESS: INDEPENDENT
HYGIENE/GROOMING: INDEPENDENT
ADLS_ACUITY_SCORE: 32
ORAL_HYGIENE: INDEPENDENT
LAUNDRY: WITH SUPERVISION
ADLS_ACUITY_SCORE: 32
ORAL_HYGIENE: INDEPENDENT
LAUNDRY: UNABLE TO COMPLETE
ADLS_ACUITY_SCORE: 32
ADLS_ACUITY_SCORE: 32
DRESS: INDEPENDENT
HYGIENE/GROOMING: INDEPENDENT

## 2023-02-14 NOTE — PLAN OF CARE
"  Problem: Plan of Care - These are the overarching goals to be used throughout the patient stay.    Goal: Optimal Comfort and Wellbeing  Outcome: Progressing   Goal Outcome Evaluation:    Plan of Care Reviewed With: patient          Patient alert and oriented x4. Calm and cooperative.   Speech minimal, soft/quiet, organized and linear.   Denied anxiety, depression, SI/SIB.   AH are at baseline - not bothersome, minimal, intermittent, when present they tell patient that he \"can't be alone\".   Appetite is good % of all meals.   Sleeping well throughout the night 6+hours.   Attended group therapy.            "

## 2023-02-14 NOTE — PLAN OF CARE
Goal Outcome Evaluation:    Pt observed intermittently in the lounge area isolative and withdrawn to self. Pt did not attend any group this shift. Pt denied SI/SIB/HI/AVH as well as anxiety and depression. Pt reported the AH are on and off but did not experience them this evening. Pt reported appetite and sleep as adequate. Staff will continue to monitor and support with current POC.     Plan of Care Reviewed With: patient

## 2023-02-14 NOTE — PLAN OF CARE
CTC:  Contacted [facility] this morning. Still on the phone with the line and have not received an answer yet.

## 2023-02-14 NOTE — PROGRESS NOTES
Behavioral Health  Note    Behavioral Health  Spirituality Group Note    UNIT [address]    Name:    Juan Ordoñez                                                                     YOB: 1970    MRN:     [id number]                                                                       Age: 52 year old      Patient attended -led group, which included discussion of spirituality, coping with illness and building resilience.    Patient attended group for [facility] - spirituality groups are not billed.    The patient actively participated in group discussion      Thomas Minor, PhD  Associate

## 2023-02-14 NOTE — PLAN OF CARE
"Occupational Therapy     02/14/23 1400   Group Therapy Session   Group Attendance attended group session   Time Session Began 1300   Time Session Ended 1400   Total Time (minutes) 60   Total # Attendees 1   Group Type recreation   Group Topic Covered cognitive activities;leisure exploration/use of leisure time;structured socialization   Group Session Detail OT: Education on cognitive wellness and interactive social activity ([employer]) to increase concentration, focus, attention to task/detail, task follow through, memory recall, healthy leisure engagement, taking turns, coping with stress, visual processing/scanning skills, heathy distraction engagement, cognitive wellness, social wellness, and social engagement   Patient Response/Contribution cooperative with task;organized;other (see comments)  (plesant; engaged)   Patient Participation Detail Pt sat with therapist and  and engaged in two novel cognitive activities. Pt able to follow verbal directions and visual demonstration for activities. Pt demonstrated average visual processing skills as he was able to quickly identify matching shapes on the cards. Pt reported he enjoyed the activities and they made him \"concentrate\". Pt reported he likes to \"read scriptures\" as a way to exercise his brain. Pt reported he received a copy of \"the scripture\" written in [place]ian from [employer].        "

## 2023-02-14 NOTE — PLAN OF CARE
"Occupational Therapy     02/14/23 1100   Group Therapy Session   Group Attendance attended group session   Time Session Began 1015   Time Session Ended 1115   Total Time (minutes) 60   Total # Attendees 2-3   Group Type task skill   Group Topic Covered coping skills/lifestyle management;structured socialization   Group Session Detail OT: Education on showing appreciation to those we care about and gesture to build community ([employer] assembly & distribution) to increase insight development, grateful behavior, connections, generosity, reminiscing, attention to task, social engagement, and social wellness   Patient Response/Contribution cooperative with task;other (see comments);organized;listened actively  (pleasant; cooperative; engaged)   Patient Participation Detail Pt reported during check-in that he cares about \"my daughters\" who are \"24 and 25\" and \"live outside of [facility]\"; pt reported he also has 5 grandchildren and other family members that live in the same area. Pt engaged in act of generosity by assembling gift bags for peers and staff. Pt able to track which bags he had placed items in using an organized manner. Pt handed out bags to staff and peers.  present for duration.          "

## 2023-02-14 NOTE — PROGRESS NOTES
"Pt participated in dance/movement therapy (D/MT) using physical movements for support and intervention for mental health symptoms.  He used rhythm for organization and opening/stretching to release tension.  He noted \"stress-relief\" as a benefit of the group.  He was engaged yet unclear about his needs.  He used general statements (with the support of a ) like: \"all good,\" and \"I liked everything.\"  He is a regular participant in D/MT.       02/14/23 1130   Expressive Therapy   Therapy Type dance/movement   Minutes of Treatment 55       "

## 2023-02-14 NOTE — PLAN OF CARE
Goal Outcome Evaluation:       Patient slept soundly for 8.25 hours. He went to the bathroom x 1 and voided freely to clear yellow urine. No complaints made. No concerns raised.

## 2023-02-15 ENCOUNTER — APPOINTMENT (OUTPATIENT)
Dept: INTERPRETER SERVICES | Facility: CLINIC | Age: 53
End: 2023-02-15
Payer: MEDICAID

## 2023-02-15 PROCEDURE — 250N000013 HC RX MED GY IP 250 OP 250 PS 637: Performed by: PSYCHIATRY & NEUROLOGY

## 2023-02-15 PROCEDURE — H2032 ACTIVITY THERAPY, PER 15 MIN: HCPCS

## 2023-02-15 PROCEDURE — 99231 SBSQ HOSP IP/OBS SF/LOW 25: CPT | Performed by: PSYCHIATRY & NEUROLOGY

## 2023-02-15 PROCEDURE — 124N000003 HC R&B MH SENIOR/ADOLESCENT

## 2023-02-15 PROCEDURE — G0177 OPPS/PHP; TRAIN & EDUC SERV: HCPCS

## 2023-02-15 RX ORDER — OLANZAPINE 15 MG/1
15 TABLET ORAL AT BEDTIME
Qty: 30 TABLET | Refills: 0 | Status: SHIPPED | OUTPATIENT
Start: 2023-02-15

## 2023-02-15 RX ORDER — IBUPROFEN 600 MG/1
600 TABLET, FILM COATED ORAL EVERY 6 HOURS PRN
Qty: 30 TABLET | Refills: 0 | Status: SHIPPED | OUTPATIENT
Start: 2023-02-15

## 2023-02-15 RX ADMIN — OLANZAPINE 15 MG: 15 TABLET, FILM COATED ORAL at 20:44

## 2023-02-15 ASSESSMENT — ACTIVITIES OF DAILY LIVING (ADL)
ADLS_ACUITY_SCORE: 32
LAUNDRY: UNABLE TO COMPLETE
ADLS_ACUITY_SCORE: 32
ORAL_HYGIENE: INDEPENDENT
ADLS_ACUITY_SCORE: 32
ADLS_ACUITY_SCORE: 32
DRESS: INDEPENDENT
HYGIENE/GROOMING: HANDWASHING;INDEPENDENT
ADLS_ACUITY_SCORE: 32
DRESS: SCRUBS (BEHAVIORAL HEALTH);INDEPENDENT
ORAL_HYGIENE: INDEPENDENT
HYGIENE/GROOMING: INDEPENDENT
ADLS_ACUITY_SCORE: 32
LAUNDRY: WITH SUPERVISION
ADLS_ACUITY_SCORE: 32

## 2023-02-15 NOTE — PROGRESS NOTES
"PSYCHIATRY  PROGRESS NOTE     DATE OF SERVICE   02/15/2023        CHIEF COMPLAINT   \" I am fine\".     SUBJECTIVE   Nursing reports:  Patient remains stable and no new behaviors have been reported.     Patient has been discussed with the  earlier today.    CTC:  Met with the patient with a . Reviewed his care and plan.  Pt reported that he would like to discharge to a crisis bed, after processing the available options.  He reported that he would like to discharge tomorrow or Friday to a crisis bed. Writer contacted [facility] Crisis Bed [facility] ([phone]) and was informed that call will need to be initiated on the morning of the discharge date, and at that time, they will determine what availabilities they have and whether the pt can be admitted.     Plan: Make a call for discharge in the morning. If there is availability, pt will discharge, if not pt will discharge the following day.       OBJECTIVE   Patient was seen and evaluated at bedside by himself, this was a face-to-face evaluation.  Interview was conducted in Faroese by this writer.  Patient reported that he is doing well and he is looking forward to be able to leave the hospital.  At the moment of the assessment he denied all psychiatric symptoms, denies hearing voices and contracted for safety.  I discussed with the patient that the  is working on the referral for a crisis bed.  Patient verbalized good understanding and is in agreement with this.  At this time he denies any concerns.    Recent Labs   Lab 02/11/23  0827 02/10/23  1114   WBC 8.0 11.8*   HGB 14.4 15.0   MCV 88 86    286   NA  --  139   POTASSIUM  --  4.3   CHLORIDE  --  102   CO2  --  26   BUN  --  12.6   CR  --  0.62*   ANIONGAP  --  11   JENN  --  9.4   GLC  --  136*   ALBUMIN  --  4.4   PROTTOTAL  --  7.6   BILITOTAL  --  0.2   ALKPHOS  --  100   ALT  --  40   AST  --  30          MEDICATIONS   Medications:  Scheduled Meds:    " "OLANZapine  15 mg Oral At Bedtime     Continuous Infusions:  PRN Meds:.acetaminophen, alum & mag hydroxide-simethicone, hydrOXYzine, ibuprofen, OLANZapine **OR** OLANZapine, senna-docusate, traZODone    Medication adherence issues: MS Med Adherence Y/N: Yes, Hospitalization  Medication side effects: MEDICATION SIDE EFFECTS: no side effects reported  Benefit: Yes / No: Yes       ROS   A comprehensive review of systems was negative.       MENTAL STATUS EXAM   Vitals: /86 (Patient Position: Sitting, Cuff Size: Adult Regular)   Pulse 81   Temp 97  F (36.1  C) (Temporal)   Resp 16   Ht 1.626 m (5' 4\")   Wt 78.8 kg (173 lb 11.6 oz)   SpO2 98%   BMI 29.82 kg/m      Same as last visit  Appearance:  No apparent distress  Mood: \"I am doing good\"  Affect: full range and bright was congruent to speech  Suicidal Ideation: PRESENT / ABSENT: absent   Homicidal Ideation: PRESENT / ABSENT: absent   Thought process: [place]   Thought content: Denied having any auditory hallucinations at the moment of the assessment.  fund of Knowledge: Below average  Attention/Concentration: Fair  Language ability:  Intact  Memory:  Immediate recall impaired, Short-term memory impaired and Long-term memory intact  Insight:  fair.  Judgement: fair  Orientation: Yes, x4  Psychomotor Behavior: normal or unremarkable    Muscle Strength and Tone: MuscleStrength: Normal  Gait and Station: Normal       LABS   personally reviewed.   No results found for this or any previous visit (from the past 24 hour(s)).  Recent Labs   Lab 02/11/23  0827 02/10/23  1114   WBC 8.0 11.8*   HGB 14.4 15.0   MCV 88 86    286   NA  --  139   POTASSIUM  --  4.3   CHLORIDE  --  102   CO2  --  26   BUN  --  12.6   CR  --  0.62*   ANIONGAP  --  11   JENN  --  9.4   GLC  --  136*   ALBUMIN  --  4.4   PROTTOTAL  --  7.6   BILITOTAL  --  0.2   ALKPHOS  --  100   ALT  --  40   AST  --  30     No results found for: PHENYTOIN, PHENOBARB, VALPROATE, CBMZ       DIAGNOSIS "   Principal Problem:    Schizophrenia, schizoaffective, chronic with acute exacerbation (H)    Active Problem List:  Patient Active Problem List   Diagnosis     Schizophrenia, schizoaffective, chronic with acute exacerbation (H)          PLAN   1. Ongoing education given regarding diagnostic and treatment options with risks, benefits and alternatives and adequate verbalization of understanding.  2.  Medications      Zyprexa to 15 mg at bedtime     3.  Medical team to follow the patient as needed.  4.   coordinating a safe discharge plan with the patient.      Risk Assessment: [facility] RISK ASSESSMENT: Patient able to contract for safety    Coordination of Care:   Treatment Plan reviewed and physician signed, Care discussed with Care/Treatment Team Members, Chart reviewed and Patient seen      Re-Certification I certify that the inpatient psychiatric facility services furnished since the previous certification were, and continue to be, medically necessary for, either, treatment which could reasonably be expected to improve the patient s condition or diagnostic study and that the hospital records indicate that the services furnished were, either, intensive treatment services, admission and related services necessary for diagnostic study, or equivalent services.     I certify that the patient continues to need, on a daily basis, active treatment furnished directly by or requiring the supervision of inpatient psychiatric facility personnel.   I estimate 14 days of hospitalization is necessary for proper treatment of the patient. My plans for post-hospital care for this patient are  TBD     Paris Mccauley MD    -     02/15/2023  -     3:33 PM    Total time 25 minutes with > 50%spent on coordination of cares and psycho-education.    This note was created with help of Dragon dictation system. Grammatical / typing errors are not intentional.    Paris Mccauley MD

## 2023-02-15 NOTE — PLAN OF CARE
Occupational Therapy     02/15/23 1400   Group Therapy Session   Group Attendance attended group session   Time Session Began 1115   Time Session Ended 1200   Total Time (minutes) 45   Total # Attendees 2-3   Group Type recreation   Group Topic Covered cognitive activities;balanced lifestyle;coping skills/lifestyle management;structured socialization   Group Session Detail OT: Education on 8 Dimensions of Wellness with interactive social activities (Exercise & Name 5) to increase concentration, focus, attention to task/detail, memory recall, coping with stress, health distraction engagement, social wellness, physical wellness, and intellectual wellness   Patient Response/Contribution cooperative with task;listened actively;organized;other (see comments)  (pleasant; cooperative; engaged)   Patient Participation Detail Pt sat among peers to complete presented activity and engaged in brief social interactions with therapist (but not peers) via . Pt engaged in all physical movement activities and answered all cognitive questions; pt able to identify the requested five responses for each question. Pt demonstrated adequate processing speed as he was able to identify responses in an average timeframe. Pt reported he liked the activity because he exercised both his mind and his body.  present for duration.

## 2023-02-15 NOTE — PLAN OF CARE
CTC:  Met with the patient with a . Reviewed his care and plan.  Pt reported that he would like to discharge to a crisis bed, after processing the available options.  He reported that he would like to discharge tomorrow or Friday to a crisis bed. Writer contacted [facility] Crisis Bed Line ([phone]) and was informed that call will need to be initiated on the morning of the discharge date, and at that time, they will determine what availabilities they have and whether the pt can be admitted.    Plan: Make a call for discharge in the morning. If there is availability, pt will discharge, if not pt will discharge the following day.

## 2023-02-15 NOTE — PROGRESS NOTES
02/14/23 2000   Group Therapy Session   Time Session Began 1900   Time Session Ended 1945   Total Time (minutes) 38   Total # Attendees 4   Group Type expressive therapy   Group Topic Covered balanced lifestyle;relaxation techniques;self-care activities   Group Session Detail Evening Relaxation   Patient Response/Contribution cooperative with task   Patient Participation Detail Cooperatively engaged in Evening Music Relaxation group to decrease anxiety and promote sleep.  Calm affect, appropriately engaged in session, responding well to the music.   present and assisting.

## 2023-02-15 NOTE — DISCHARGE INSTRUCTIONS
Behavioral Discharge Planning and Instructions    Summary: You were admitted on 12/30/2022  due to Schizophrenia. You were treated by Paris Mccauley MD and discharged on 2/21/2023  from [facility] to [facility] at [address] [place] [address]: Phone: [phone]; Fax: [phone].    Main Diagnosis:  Schizophrenia, schizoaffective, chronic with acute exacerbation (H)    Particulars for your Emergency Medical Assistance:  Emergency Medical Assistance is: [phone]  Case Number with [facility] is: [id number].    Medical Appointment/Follow-Up (Primary Care): Medical Appointment  [facility]  [address] [place] [address]  Phone:[phone]   Fax: [phone]  Appointment Date/Time: Monday, March 6, 2023 at 3:20 pm.      Mental Health Psychiatry/Medication Management  [facility] (with )  Address: [address] [place] [address] (5th Floor)  Phone: [phone]  Fax: [phone]  Appointment Date/Time:   First, you will complete a psychiatric assessment with a psychiatric social worker, then you will see your psychiatrist: We have referred you for: Psychiatry and psychotherapy  Date of appointment is Wednesday, March 8, 2023 at 12:30 pm.   Name of  Provider: Danni Heck  Please make sure to be at this facility by 12:00 pm. They will have a  waiting for you.    Crisis Stabilization Program/Bed:  [facility]  [address] [place] [address]  Phone: [phone]  Fax: [phone]  Appointment Date/Time: Wednesday, March 1, 2023 at 04:00 pm    [facility] Case Management Referral:  You have been referred to [facility] for mental health case management. Please follow up with the county regarding the status of your referral: Phone: [phone]. E-mail:[email or web address].     Attend all scheduled appointments  with your outpatient providers. Call at least 24 hours in advance if you need to reschedule an appointment to ensure continued access to your outpatient providers.     Major Treatments, Procedures and Findings:  You were provided with: a psychiatric assessment, assessed for medical stability, medication evaluation and/or management, group therapy, milieu management, and medical interventions    Symptoms to Report: feeling more aggressive, increased confusion, losing more sleep, mood getting worse, or thoughts of suicide    Early warning signs can include: increased depression or anxiety sleep disturbances increased thoughts or behaviors of suicide or self-harm  increased unusual thinking, such as paranoia or hearing voices    Safety and Wellness:  Take all medicines as directed.  Make no changes unless your doctor suggests them.      Follow treatment recommendations.  Refrain from alcohol and non-prescribed drugs.  Ask your support system to help you reduce your access to items that could harm yourself or others. Items could include:  Medicines (both prescribed and over-the-counter)  Knives and other sharp objects  Ropes and like materials  Car keys  If there is a concern for safety, call 911. If there is a concern for safety, call 911.    Resources:   Crisis Intervention: 314.606.7359 or 542-466-8939 (TTY: 826.156.1820).  Call anytime for help.  National [place] on Mental Illness (www.[place].fabienne.org): 295.713.4769 or 507-366-0687.  Suicide Awareness Voices of Education (SAVE) (www.save.org): 864-510-FAMU (4283)  National Suicide Prevention Line (www.mentalhealthmn.org): 046-089-TFHE (6709)  Mental Health Consumer/Survivor Network of [place] (www.mhcsn.net): 474.578.9338 or 437-566-0990  Mental Health Association of [place] (www.mentalhealth.org): 183.879.7688 or 651-610-0599  Self- Management and Recovery Training., SMART-- Toll free: 560.390.3143  www.[employer].org  [facility] Crisis Response [phone]  [place]  "[facility] Crisis Response [phone]  [facility] Crisis Response [phone]  [facility] Crisis (COPE) Response - Adult [phone]  [facility] Crisis Response - Adult [phone]  Text 4 Life: txt \"LIFE\" to 13691 for immediate support and crisis intervention  Crisis text line: Text \"[place]\" to 904467. Free, confidential, 24/7.  Crisis Intervention: 730.417.6927 or 208-738-2859. Call anytime for help.   [facility] Mental Health Crisis Team - Child: [phone]  [facility] Mental Health Crisis Response Team - Child: [phone]  [facility] Mental Health Crisis: [phone]   [facility] Mental Health Crisis Team:  [phone]  [facility] Crisis Response Team (CRT):  [phone] or 210-530-8871   [facility] Rapid Response: [phone]    General Medication Instructions:   See your medication sheet(s) for instructions.   Take all medicines as directed.  Make no changes unless your doctor suggests them.   Go to all your doctor visits.  Be sure to have all your required lab tests. This way, your medicines can be refilled on time.  Do not use any drugs not prescribed by your doctor.  Avoid alcohol.    Advance Directives:   Scanned document on file with [facility]? No scanned doc  Is document scanned? Minor-N/A  Honoring Choices Your Rights Handout: Minor - N/A  Was more information offered? Minor-N/A    The Treatment team has appreciated the opportunity to work with you. If you have any questions or concerns about your recent admission, you can contact the unit which can receive your call 24 hours a day, 7 days a week. They will be able to get in touch with a Provider if needed. The unit number is [phone].      "

## 2023-02-15 NOTE — PLAN OF CARE
"  Problem: Depression  Goal: Improved Mood  Outcome: Progressing      Patient has been visible in the milieu but continues to avoid social interaction with peers due to language barrier. Patient ate 100% of dinner with no nausea or stomach discomfort, shower after dinner and attended group session with . No aggressive and assaultive behavior noted. Reported having minimal auditory hallucination \"I don't belong here\".   Patient affect remained flat/blunted with calm mood. Patient contract for safety and medication compliant. Vital sign check WNL.                           "

## 2023-02-15 NOTE — PLAN OF CARE
Problem: Sleep Disturbance  Goal: Adequate Sleep/Rest  Outcome: Progressing     Problem: Sleep Disturbance  Goal: Adequate Sleep/Rest  Outcome: Progressing   Goal Outcome Evaluation:         Slept well for 9.75 hours  All precautions maintained  No intake/output this shift  No concerns raised.

## 2023-02-15 NOTE — PLAN OF CARE
Occupational Therapy     02/15/23 1500   Group Therapy Session   Group Attendance attended group session   Time Session Began 1015   Time Session Ended 1115   Total Time (minutes) 60   Total # Attendees 3   Group Type task skill   Group Topic Covered balanced lifestyle;cognitive activities;coping skills/lifestyle management;leisure exploration/use of leisure time;structured socialization;problem-solving   Group Session Detail OT: Education on relaxation and creative hands on endeavor (Sticker Art) for healthy relaxation, coping with stress, opportunity to socialize, concentration, attention to detail, problem solving, frustration tolerance, creative expression, healthy leisure engagement, improving feelings of self-worth, planning and sequencing   Patient Response/Contribution cooperative with task;organized;listened actively;other (see comments)  (pleasant; cooperative)   Patient Participation Detail Pt sat among peers to complete selected creative hands on endeavor but did not engage in social interactions with peers; pt engaged in brief social interactions with therapist on approach. Pt able to recall directions for activity from a previous session. Pt worked in a neat and tidy manner to complete project and cleaned-up all supplies and his workspace.  present for duration.

## 2023-02-15 NOTE — PROGRESS NOTES
Pt has been attending grps, also sitting out in the DR; keeps to self. He ate lunch well. Good shift.

## 2023-02-15 NOTE — PLAN OF CARE
Goal Outcome Evaluation:    Pt is calm and pleasant, smiling. BH asmnt done through . He denies all MH sx. Pt states he attends all grps, and eats his meals well; ate all of bkfst. He rests in his rm, but said he will not nap during the day. He has no scheduled meds. Interpreters here for much of day shift, should pt have requests. Pt agrees to ask for needed assist through , as speaks minimal English. Vss.

## 2023-02-16 ENCOUNTER — APPOINTMENT (OUTPATIENT)
Dept: INTERPRETER SERVICES | Facility: CLINIC | Age: 53
End: 2023-02-16
Payer: MEDICAID

## 2023-02-16 PROCEDURE — 250N000013 HC RX MED GY IP 250 OP 250 PS 637: Performed by: PSYCHIATRY & NEUROLOGY

## 2023-02-16 PROCEDURE — G0177 OPPS/PHP; TRAIN & EDUC SERV: HCPCS

## 2023-02-16 PROCEDURE — H2032 ACTIVITY THERAPY, PER 15 MIN: HCPCS

## 2023-02-16 PROCEDURE — 99232 SBSQ HOSP IP/OBS MODERATE 35: CPT | Performed by: NURSE PRACTITIONER

## 2023-02-16 PROCEDURE — 124N000003 HC R&B MH SENIOR/ADOLESCENT

## 2023-02-16 RX ADMIN — OLANZAPINE 15 MG: 15 TABLET, FILM COATED ORAL at 20:45

## 2023-02-16 ASSESSMENT — ACTIVITIES OF DAILY LIVING (ADL)
ADLS_ACUITY_SCORE: 32
HYGIENE/GROOMING: INDEPENDENT
ORAL_HYGIENE: INDEPENDENT
ADLS_ACUITY_SCORE: 32
DRESS: INDEPENDENT
LAUNDRY: WITH SUPERVISION
ADLS_ACUITY_SCORE: 32

## 2023-02-16 NOTE — SIGNIFICANT EVENT
Significant Event Note    Time of event: Approximately 0800    Description of event:  I was contacted by nursing on [facility] that this patient, Juan Ordoñez, had inadvertently received medications intended for another patient.  Medications received included Lamictal 25 mg, Xanax 0.5 mg, and estradiol 2 mg.  Per conversation with nursing, this occurred due to patient name similarity.    I did meet with the patient around roughly 1100 to check in on him.  Interview conducted with assistance of .  We did relay to the patient that a medication mistake was made.  Patient reports overall feeling fine without any side effects.  He denies any excessive tiredness/fatigue.  He had no questions or concerns.    Physical exam  Constitutional: awake, alert, cooperative, in no acute distress.    HENT: Normocephalic, without obvious abnormality, atraumatic.   Respiratory: No increased work of breathing. Clear to auscultation bilaterally, no crackles or wheezing  Cardiovascular: RRR. No murmur or friction rub. No edema. No chest wall tenderness.  GI: Soft, non-tender without rebound or guarding. Bowel sounds are active.   Musculoskeletal:  Full range of motion noted.    Neurologic: Cranial nerves II-XII are grossly intact.   Neuropsychiatric: General: normal, calm and normal eye contact. Normal affect      Plan:  - I discussed this event with both pharmacy and poison control.  Based on patient's medication list, which includes only olanzapine 15 mg at night, and the medication received this morning there is low risk for any toxicity or major side effects.  - Most likely to occur adverse effect would be sedation, so please monitor closely - pt currently denies feeling sedated   - Events discussed with nursing, nurse supervisor, and nurse manager  - Compass to be filed by nursing staff.    Please contact medicine team with any further questions or concerns.    Reese Calderon PA-C  Internal Medicine [facility]  [facility]  Pager [phone]  Available via [employer]

## 2023-02-16 NOTE — PLAN OF CARE
"  Problem: Adult Behavioral Health Plan of Care  Goal: Plan of Care Review  Recent Flowsheet Documentation  Taken 2/16/2023 1650 by Tanya Noble RN  Patient Agreement with Plan of Care: agrees   Goal Outcome Evaluation:    Plan of Care Reviewed With: patient    Patient is A&O x 4, able to make needs known. Pt is calm with flat affect but easily approachable. Pt ate 100 % of dinner. Denies symptom of mental health, pain or discomfort.VS monitored closely.  Patient has spent the shift lying down in bed. Patient stepped out of room for dinner and at 1930 stepped out for a snack.      /82   Pulse 92   Temp 97.5  F (36.4  C) (Temporal)   Resp 16   Ht 1.626 m (5' 4\")   Wt 78.6 kg (173 lb 4.5 oz)   SpO2 96%   BMI 29.74 kg/m      Patient  appears calm , no signs of distress or discomfort.Pt denies symptoms of anxiety, or  depression Denies SI/HI/SIB.  Visible on unit . Contracts for safety while on unit.   was here for pt's therapy but was cancelled.   Staff will continue to monitor and update changes.    /80   Pulse 86   Temp 97.1  F (36.2  C) (Oral)   Resp 16   Ht 1.626 m (5' 4\")   Wt 78.6 kg (173 lb 4.5 oz)   SpO2 95%   BMI 29.74 kg/m        "

## 2023-02-16 NOTE — PROGRESS NOTES
"Pt participated in dance/movement therapy (D/MT) without the assistance of language interpretation however he was still an engaged participant in non-verbal therapeutic processes of organization, attachment, and affect regulation.  Pt somatic organization expressed patterns, however, these movements were not fluid.  He held his body with tension and only occasionally could join this therapist in shaping expressions.  He did smile frequently throughout the session and sang along to familiar Cumbia [place] lyrics.  Pt expressed gratitude for calming motions (like lateral swaying) which he was able to shift from self-soothing/ self-referencing to the sagittal plane with joining and intervention from the therapist. This indicates his level of readiness for forward motion and other ways of \"moving on.\"         02/16/23 1130   Expressive Therapy   Therapy Type dance/movement   Minutes of Treatment 60       "

## 2023-02-16 NOTE — PLAN OF CARE
Problem: Adult Behavioral Health Plan of Care  Goal: Adheres to Safety Considerations for Self and Others  Outcome: Progressing   Goal Outcome Evaluation:  Pt quiet with flat effect on unit . Pt out and attended groups . Ate well for both meals . Pt discharge was cancelled due to bed availability at the crisis center . Pt denies SI/SIB , Anxiety , depression but endorse hearing voices that tells him to move his body. He does denies visual hallucination . Pt denies any pain or discomfort

## 2023-02-16 NOTE — PROGRESS NOTES
02/15/23 2000   Group Therapy Session   Group Attendance attended group session   Time Session Began 1900   Time Session Ended 1950   Total Time (minutes) 50   Total # Attendees 1   Group Type recreation   Group Topic Covered leisure exploration/use of leisure time   Group Session Detail TR leisure group   Patient Response/Contribution cooperative with task   Patient Participation Detail Pt participated in a structured Therapeutic Recreation group with a focus on leisure participation, stress reduction, and social engagement via an active group game. Pt remained focused and engaged throughout full duration of group.  Pt mood was calm and quiet, limited socializing with this writer or the . Pt participated in an unfamiliar game, utilizing guidance throughout. Pt seemed to catch on to some basic scoring combinations independently as group went on.

## 2023-02-16 NOTE — PROGRESS NOTES
"PSYCHIATRY  PROGRESS NOTE     DATE OF SERVICE   02/16/2023           CHIEF COMPLAINT   \"I'm doing better\"       SUBJECTIVE   Nursing reports: Pt quiet with flat effect on unit . Pt out and attended groups . Ate well for both meals . Pt discharge was cancelled due to bed availability at the crisis center . Pt denies SI/SIB , Anxiety , depression but endorse hearing voices that tells him to move his body. He does denies visual hallucination . Pt denies any pain or discomfort        reports: Discharge planned for tomorrow to [facility]; crisis bed          OBJECTIVE   Chart reviewed.  Treatment team attended this a.m.  Patient interview conducted at bedside with .  Patient presents with flat affect/calm behavior.  Patient reports significant reduction in frequency/intensity of auditory hallucinations.  Patient states that hallucinations occur approximately 10 times per day versus on a continual basis which was occurring upon admission. Pt states that the voices tell him, \"you can't stay here; get out\". Denies command hallucinations to harm/kill himself/others. Pt aware of potential discharge tomorrow to [facility]. Pt denies having a support system in place. Pt stated that friends/family that he did know in the area have been deported. Will need to establish crisis plan with language services available. Will collaborate with [facility] prior to discharge. Denies side effects of medication and that he is sleeping and eating well. Attends available groups on the unit.        MEDICATIONS   Medications:  Scheduled Meds:    OLANZapine  15 mg Oral At Bedtime     Continuous Infusions:  PRN Meds:.acetaminophen, alum & mag hydroxide-simethicone, hydrOXYzine, ibuprofen, OLANZapine **OR** OLANZapine, senna-docusate, traZODone    Medication adherence issues: MS Med Adherence Y/N: No  Medication side effects: MEDICATION SIDE EFFECTS: no side effects reported  Benefit: Yes / No: Yes   " "    ROS   Pertinent items are noted in HPI.       MENTAL STATUS EXAM   Vitals: BP (!) 137/90   Pulse 66   Temp 96.9  F (36.1  C) (Temporal)   Resp 16   Ht 1.626 m (5' 4\")   Wt 78.6 kg (173 lb 4.5 oz)   SpO2 97%   BMI 29.74 kg/m      Appearance:  No apparent distress  Mood:  {Mood: Euthymic  Affect: flat  was congruent to speech  Suicidal Ideation: PRESENT / ABSENT: absent   Homicidal Ideation: PRESENT / ABSENT: absent   Thought process: unremarkable; reality based, linear   Thought content: devoid  suicidal and violent ideation.   Fund of Knowledge: Average  Attention/Concentration: Normal  Language ability:  Intact  Memory:  Immediate recall intact  Insight:  adequate.  Judgement: fair  Orientation: Yes, x4  Psychomotor Behavior: normal or unremarkable    Muscle Strength and Tone: MuscleStrength: Normal  Gait and Station: Normal       LABS   personally reviewed.     No results found for: PHENYTOIN, PHENOBARB, VALPROATE, CBMZ       DIAGNOSIS   Principal Problem:    Schizophrenia, schizoaffective, chronic with acute exacerbation (H)    Active Problem List:  Patient Active Problem List   Diagnosis     Schizophrenia, schizoaffective, chronic with acute exacerbation (H)          PLAN   Plan as per Dr. Sergey MD dated 2/15/23:     1. Ongoing education given regarding diagnostic and treatment options with risks, benefits and alternatives and adequate verbalization of understanding.  2.  Medications      Zyprexa to 15 mg at bedtime      3.  Medical team to follow the patient as needed.  4.   coordinating a safe discharge plan with the patient.           Risk Assessment: [facility] RISK ASSESSMENT: Patient able to contract for safety    Coordination of Care:   Treatment Plan reviewed and physician signed, Care discussed with Care/Treatment Team Members, Chart reviewed and Patient seen      Re-Certification I certify that the inpatient psychiatric facility services furnished since the previous " certification were, and continue to be, medically necessary for, either, treatment which could reasonably be expected to improve the patient s condition or diagnostic study and that the hospital records indicate that the services furnished were, either, intensive treatment services, admission and related services necessary for diagnostic study, or equivalent services.     I certify that the patient continues to need, on a daily basis, active treatment furnished directly by or requiring the supervision of inpatient psychiatric facility personnel.   I estimate 1-2 days of hospitalization is necessary for proper treatment of the patient. My plans for post-hospital care for this patient are [facility]/crisis bed.      SERGEY Friedman CNP    -     02/16/2023  -     10:52 AM    Total time  35 minutes with > 50%spent on coordination of cares and psycho-education.    This note was created with help of Dragon dictation system. Grammatical / typing errors are not intentional.    SERGEY Friedman CNP

## 2023-02-16 NOTE — PROVIDER NOTIFICATION
02/16/23 1226   Vital Signs   Pulse 86   Pulse Rate Source Monitor   /74   BP Location Right arm   Patient Position Sitting   Oxygen Therapy   SpO2 95 %   O2 Device None (Room air)   Pt was administered Lamictal 25 mg, Xanax 0.5mg  and Estrace 2mg in error . Dr Calderon , Dr De , SKYLER Boyce and [facility] including unit charge Nurse were all  notified . Also a [facility] report was made . Pt Doctor and writer assessed pt letting him know he was given the wrong medication .  but pt states he has no changes and states he is fine . VSS . Will continue to monitor for any changes

## 2023-02-16 NOTE — PLAN OF CARE
Problem: Psychotic Signs/Symptoms  Goal: Improved Sleep (Psychotic Signs/Symptoms)  Outcome: Progressing   Goal Outcome Evaluation:               Slept uninterrupted for 9.25 hours  For possible discharge today if there is an open bed at [facility].  No behavioral issues encountered this shift

## 2023-02-16 NOTE — PLAN OF CARE
Problem: Psychotic Signs/Symptoms  Goal: Optimal Cognitive Function (Psychotic Signs/Symptoms)  Outcome: Progressing   Goal Outcome Evaluation:    Patient reports improve mood, decreased symptoms of anxiety, depression and auditory hallucinations.  was used during check in. Patient has been attending group activities, is complaint with medications and denies any concerns. Patient is looking forward to discharging tomorrow.

## 2023-02-16 NOTE — PLAN OF CARE
Occupational Therapy Group Note:       02/16/23 1126   Group Therapy Session   Group Attendance attended group session   Time Session Began 1020   Time Session Ended 1110   Total Time (minutes) 45   Total # Attendees 4   Group Type task skill   Group Topic Covered coping skills/lifestyle management;structured socialization;relaxation techniques   Group Session Detail OT Clinic   Patient Response/Contribution cooperative with task   Patient Participation Detail Pt actively participated in occupational therapy clinic to facilitate coping skill exploration, creative expression within personally meaningful activities, and clinical observation of social, cognitive, and kinesthetic performance skills. Patient introduced self to group when prompted. Patient requested to initiate a familiar scratch art task this date. Patient has completed multiple scratch off projects in previous sessions. Patient reported enjoying this specific task due to the amount of detail he has to put into it. Patient confirmed he is a detail oriented person. Patient demonstrated excellent focus throughout duration of group; one brief break in group due to meeting with medical team. Upon return, patient continued work on task with excellent attention. Patient demonstrated safety awareness with use of scratch off tool. Patient readily returned scratch off tool at end of group. Patient would answer therapeutic questions/prompts with short responses when directly asked; however, did not initiate any conversation with peers. Patient was a calm, engaged participate this date.

## 2023-02-17 ENCOUNTER — APPOINTMENT (OUTPATIENT)
Dept: INTERPRETER SERVICES | Facility: CLINIC | Age: 53
End: 2023-02-17
Payer: MEDICAID

## 2023-02-17 PROCEDURE — 250N000013 HC RX MED GY IP 250 OP 250 PS 637: Performed by: NURSE PRACTITIONER

## 2023-02-17 PROCEDURE — H2032 ACTIVITY THERAPY, PER 15 MIN: HCPCS

## 2023-02-17 PROCEDURE — 99232 SBSQ HOSP IP/OBS MODERATE 35: CPT | Performed by: NURSE PRACTITIONER

## 2023-02-17 PROCEDURE — 124N000003 HC R&B MH SENIOR/ADOLESCENT

## 2023-02-17 RX ORDER — OLANZAPINE 10 MG/1
20 TABLET ORAL AT BEDTIME
Status: DISCONTINUED | OUTPATIENT
Start: 2023-02-17 | End: 2023-03-01 | Stop reason: HOSPADM

## 2023-02-17 RX ADMIN — OLANZAPINE 20 MG: 10 TABLET, FILM COATED ORAL at 20:50

## 2023-02-17 ASSESSMENT — ACTIVITIES OF DAILY LIVING (ADL)
ADLS_ACUITY_SCORE: 32

## 2023-02-17 NOTE — PLAN OF CARE
Assessment/Intervention/Current Symtoms and Care Coordination  -Rounded with the team  -Reviewed pt chart and documentation  -Placed a call to Crisis bed for pt to be considered. Have not received a call back  -Left a message with [facility], for long-term housing consideration ([phone]).  -Awaiting feedback  -[facility] is reporting that they do not have a current opening for IRTS. Pt has not been accepted into the program due to financial information confirmation difficulties.    Plan: Writer will continue to engage the systems, discuss with the pt regarding his options, given his status.      Discharge Plan or Goal  Plan is for the pt to be discharged to an IRTS  Pt can go to a crisis bed while waiting for IRTS       Barriers to Discharge   -Barrier to discharge is due to funding issues - pt is on emergency MA and [facility] is requesting specific information. Financial services department reported being unable to provide the information. Have called [facility] several times to request the information but also awaiting feedback.     Referral Status  [facility], Crisis Bed, [facility]:  [facility]: 30-90 day stay  [address] [place] [address]  Phone: [phone]  Legal Status  Voluntary

## 2023-02-17 NOTE — PLAN OF CARE
CTC: Contacted [facility] Crisis Bed this morning and requested the pt to be considered. Awaiting response. Will also try again tomorrow morning.

## 2023-02-17 NOTE — PLAN OF CARE
Problem: Sleep Disturbance  Goal: Adequate Sleep/Rest  Outcome: Progressing   Goal Outcome Evaluation:                  Slept well for  8.54 hours  Arousable with verbal stimuli and cooperative with assessment.  VS stable at midnight and BP 96/58 at 0430, denied feeling dizzy. Independent with ADL's. No concerns raised this shift

## 2023-02-17 NOTE — PROGRESS NOTES
"PSYCHIATRY  PROGRESS NOTE     DATE OF SERVICE   02/17/2023           CHIEF COMPLAINT   \"I'm doing better\"       SUBJECTIVE   Nursing reports: Plan of Care Reviewed With: patient    Patient is A&O x 4, able to make needs known. Pt is calm with flat affect but easily approachable. Pt ate 100 % of dinner. Denies symptom of mental health, pain or discomfort.VS monitored closely.  Patient has spent the shift lying down in bed. Patient stepped out of room for dinner and at 1930 stepped out for a snack.     reports:   -Rounded with the team  -Reviewed pt chart and documentation  -Placed a call to Crisis bed for pt to be considered. Have not received a call back  -Left a message with [facility], for long-term housing consideration ([phone]).  -Awaiting feedback  -[facility] is reporting that they do not have a current opening for IRTS. Pt has not been accepted into the program due to financial information confirmation difficulties.     Plan: Writer will continue to engage the systems, discuss with the pt regarding his options, given his status.      Discharge Plan or Goal  Plan is for the pt to be discharged to an IRTS  Pt can go to a crisis bed while waiting for IRTS       OBJECTIVE   Chart reviewed.  Treatment team attended this a.m. upon assessment through use of phone  services patient offered very superficial 1-2 word responses during her interaction.  Patient stated he did not remember this writer from our interaction yesterday.  Patient did not remember the name of the medication as discussed yesterday.  Patient agreeable to increasing dose of Zyprexa to 20 mg nightly as patient continues to appear internally preoccupied.  Patient does report overall reduction in frequency of auditory hallucinations.  Patient denies that hallucinations are command in nature in nature.  Patient presents as very vulnerable in the community due to chronic mental illness, lack of " "language skills as well as homelessness.  Patient reports he has no known support system in the [place] area.  Patient has been willing to attend available programming with  staff.  Patient denies suicidal/homicidal ideation/intent/plan.        MEDICATIONS   Medications:  Scheduled Meds:    OLANZapine  15 mg Oral At Bedtime     Continuous Infusions:  PRN Meds:.acetaminophen, alum & mag hydroxide-simethicone, hydrOXYzine, ibuprofen, OLANZapine **OR** OLANZapine, senna-docusate, traZODone    Medication adherence issues: MS Med Adherence Y/N: No  Medication side effects: MEDICATION SIDE EFFECTS: no side effects reported  Benefit: Yes / No: Yes       ROS   Pertinent items are noted in HPI.       MENTAL STATUS EXAM   Vitals: /82 (BP Location: Left arm, Patient Position: Sitting, Cuff Size: Adult Regular)   Pulse 81   Temp 97.4  F (36.3  C) (Temporal)   Resp 18   Ht 1.626 m (5' 4\")   Wt 78.6 kg (173 lb 4.5 oz)   SpO2 98%   BMI 29.74 kg/m      Appearance:  No apparent distress  Mood:  {Mood: Euthymic  Affect: flat  was congruent to speech  Suicidal Ideation: PRESENT / ABSENT: absent   Homicidal Ideation: PRESENT / ABSENT: absent   Thought process: superficial; appears internally preoccupied   Thought content: devoid  suicidal and violent ideation.   Fund of Knowledge: Average  Attention/Concentration: Normal  Language ability:  Intact  Memory:  Immediate recall intact  Insight: limited   Judgement: fair  Orientation: Yes, x4  Psychomotor Behavior: normal or unremarkable    Muscle Strength and Tone: MuscleStrength: Normal  Gait and Station: Normal       LABS   personally reviewed.     No results found for: PHENYTOIN, PHENOBARB, VALPROATE, CBMZ       DIAGNOSIS   Principal Problem:    Schizophrenia, schizoaffective, chronic with acute exacerbation (H)    Active Problem List:  Patient Active Problem List   Diagnosis     Schizophrenia, schizoaffective, chronic with acute exacerbation (H)      "     PLAN   Plan as per Dr. Sergey MD dated 2/15/23:     1. Ongoing education given regarding diagnostic and treatment options with risks, benefits and alternatives and adequate verbalization of understanding.  2.  Medications      Zyprexa to 15 mg at bedtime      3.  Medical team to follow the patient as needed.  4.   coordinating a safe discharge plan with the patient.    Psychiatric cross coverage provided by SERGEY Friedman CNP on 2/16/2023 at 1:17 PM  2/16/23: Continue with current treatment plan.   2/17/23: Increase Zyprexa to 20 mg q HS.       Risk Assessment: [facility] RISK ASSESSMENT: Patient able to contract for safety    Coordination of Care:   Treatment Plan reviewed and physician signed, Care discussed with Care/Treatment Team Members, Chart reviewed and Patient seen      Re-Certification I certify that the inpatient psychiatric facility services furnished since the previous certification were, and continue to be, medically necessary for, either, treatment which could reasonably be expected to improve the patient s condition or diagnostic study and that the hospital records indicate that the services furnished were, either, intensive treatment services, admission and related services necessary for diagnostic study, or equivalent services.     I certify that the patient continues to need, on a daily basis, active treatment furnished directly by or requiring the supervision of inpatient psychiatric facility personnel.   I estimate 1-2 days of hospitalization is necessary for proper treatment of the patient. My plans for post-hospital care for this patient are [facility]/crisis bed.      SERGEY Friedman CNP    -     02/17/2023  -     10:17 AM    Total time  35 minutes with > 50%spent on coordination of cares and psycho-education.    This note was created with help of Dragon dictation system. Grammatical / typing errors are not intentional.    SERGEY Friedman  CNP

## 2023-02-17 NOTE — PROVIDER NOTIFICATION
02/17/23 0812   Vital Signs   Temp 97.4  F (36.3  C)   Temp src Temporal   Resp 18   Pulse 81   Pulse Rate Source Monitor   /82   BP Location Left arm   Patient Position Sitting   Cuff Size Adult Regular   Standing Orthostatic BP   Standing Orthostatic /77   Standing Orthostatic Pulse 94 bpm   Oxygen Therapy   SpO2 98 %   O2 Device None (Room air)   Pt is clam and quiet  with flat affect . Pt in his bed and was easy to arouse  to voice . Pt uto meals and ate 100% of both meals VSS . Pt denies SI/SIB, Anxiety , Depression but admits to auditory hallucination but denies visual hallucination . There are calls out for discharge placement to a facility who has agreed to accept him for 90 days stay but will wait for his primary Psychiatric Doctor for final discharge . Pt offers no C/O pain or discomfort . Will  continue with POC

## 2023-02-18 PROCEDURE — 250N000013 HC RX MED GY IP 250 OP 250 PS 637: Performed by: NURSE PRACTITIONER

## 2023-02-18 PROCEDURE — 124N000003 HC R&B MH SENIOR/ADOLESCENT

## 2023-02-18 RX ADMIN — OLANZAPINE 20 MG: 10 TABLET, FILM COATED ORAL at 20:23

## 2023-02-18 ASSESSMENT — ACTIVITIES OF DAILY LIVING (ADL)
ADLS_ACUITY_SCORE: 32
HYGIENE/GROOMING: INDEPENDENT
ADLS_ACUITY_SCORE: 32
ADLS_ACUITY_SCORE: 32
ORAL_HYGIENE: INDEPENDENT
ADLS_ACUITY_SCORE: 32
ADLS_ACUITY_SCORE: 32
DRESS: INDEPENDENT
ADLS_ACUITY_SCORE: 32
LAUNDRY: WITH SUPERVISION
ADLS_ACUITY_SCORE: 32

## 2023-02-18 NOTE — PLAN OF CARE
Problem: Psychotic Signs/Symptoms  Goal: Improved Sleep (Psychotic Signs/Symptoms)  Outcome: Progressing   Goal Outcome Evaluation:               Slept well for 9.5 hours  No concerns raised tonight  Independent with his ADL's

## 2023-02-18 NOTE — PLAN OF CARE
Problem: Psychotic Signs/Symptoms  Goal: Optimal Cognitive Function (Psychotic Signs/Symptoms)  Intervention: Support and Promote Cognitive Ability  Recent Flowsheet Documentation  Taken 2/18/2023 1100 by Elva Parkinson RN  Trust Relationship/Rapport:   Goal Outcome Evaluation:    Plan of Care Reviewed With: patient      Patient calm and visible in milieu. He reports being in good mood, denies anxiety, depression and SI/SIB. Patient continues to report improvement in auditory hallucination stating the voices are faint and come and go. Patient is eating and drinking adequately, denies any pain or discomfort.

## 2023-02-18 NOTE — PROGRESS NOTES
02/17/23 2000   Group Therapy Session   Time Session Began 1900   Time Session Ended 1950   Total Time (minutes) 40-45   Total # Attendees 3-4   Group Type expressive therapy   Group Topic Covered balanced lifestyle;leisure exploration/use of leisure time;self-care activities   Group Session Detail Evening Relaxation   Patient Response/Contribution cooperative with task   Patient Participation Detail Cooperatively engaged in Evening Music Relaxation group to decrease anxiety and promote sleep.  Calm affect, appropriately engaged in session, responding well to the music.   present and assisted as needed.

## 2023-02-18 NOTE — PLAN OF CARE
Problem: Adult Behavioral Health Plan of Care  Goal: Adheres to Safety Considerations for Self and Others  Outcome: Adequate for Care Transition  Intervention: Develop and Maintain Individualized Safety Plan  Recent Flowsheet Documentation  Taken 2/17/2023 1738 by Alyse Davis, RN  Safety Measures:   safety rounds completed   environmental rounds completed   safety plan reviewed  Taken 2/17/2023 1059 by Alyse Davis RN  Safety Measures:   safety rounds completed   environmental rounds completed   safety plan reviewed   Goal Outcome Evaluation:  Pt is quiet and pleasant on approach . He was out for meals and attended groups with his  . Pt is medication complaint . Pt denied SI/SIB . Anxiety and depression but endorse auditory hallucination and  denied visual hallucination . Pt was seen mostly in milieu after groups . Plans to discharge pt next week to a facility that will accept him for 90 days . Will continue POC

## 2023-02-19 PROCEDURE — 124N000003 HC R&B MH SENIOR/ADOLESCENT

## 2023-02-19 PROCEDURE — 250N000013 HC RX MED GY IP 250 OP 250 PS 637: Performed by: NURSE PRACTITIONER

## 2023-02-19 RX ADMIN — OLANZAPINE 20 MG: 10 TABLET, FILM COATED ORAL at 20:13

## 2023-02-19 ASSESSMENT — ACTIVITIES OF DAILY LIVING (ADL)
ADLS_ACUITY_SCORE: 32
ADLS_ACUITY_SCORE: 32
HYGIENE/GROOMING: INDEPENDENT
ORAL_HYGIENE: INDEPENDENT
ADLS_ACUITY_SCORE: 32
HYGIENE/GROOMING: INDEPENDENT
ADLS_ACUITY_SCORE: 32
ADLS_ACUITY_SCORE: 32
LAUNDRY: WITH SUPERVISION
ADLS_ACUITY_SCORE: 32
ADLS_ACUITY_SCORE: 32
ORAL_HYGIENE: INDEPENDENT
DRESS: INDEPENDENT
LAUNDRY: WITH SUPERVISION
ADLS_ACUITY_SCORE: 32
DRESS: SCRUBS (BEHAVIORAL HEALTH);INDEPENDENT
ADLS_ACUITY_SCORE: 32
ADLS_ACUITY_SCORE: 32

## 2023-02-19 NOTE — PLAN OF CARE
Patient was visible in the milieu this shift, was seen sitting in lounge working on puzzles. Patient was calm on approach.  service line was used for assessment this shift. Patient denied SI/SIB/HI/VH. Patient endorsed AH, stated that voices were telling him that he could not stay in one place. Patient agreed to contract staff for safety . Patient denied pain. Patient denied anxiety/depression. Patient endorsed feeling safe on the unit. Patient endorsed having no issues with sleep and feeling great. Patient denied having any physical/medical concerns. Patient is eating and drinking well, ate 100% for dinner. Patient has been quiet, no behavioral issues observed or reported, appeared to be socially withdrawn. Patient was compliant with medication. Will continue to monitor and assess needs.    Problem: Psychotic Signs/Symptoms  Goal: Optimal Cognitive Function (Psychotic Signs/Symptoms)  Outcome: Progressing    Plan of Care Reviewed With: patient Plan of Care Reviewed With: patient

## 2023-02-19 NOTE — PLAN OF CARE
Problem: Sleep Disturbance  Goal: Adequate Sleep/Rest  Outcome: Progressing   Goal Outcome Evaluation:                      Slept well for 9.5 hours, Independent with ADL's  No concerns raised this shift

## 2023-02-19 NOTE — PLAN OF CARE
Problem: Adult Behavioral Health Plan of Care  Goal: Optimized Coping Skills in Response to Life Stressors  Outcome: Adequate for Care Transition   Goal Outcome Evaluation:  Pt calm and quiet on milieu easily approachable . He ate well for supper . Pt denies Anxiety, depression , SIB/SIB but admits hearing voices . Pt  Is medication complaint . No complained of pain or discomfort

## 2023-02-19 NOTE — PLAN OF CARE
Problem: Psychotic Signs/Symptoms  Goal: Optimal Cognitive Function (Psychotic Signs/Symptoms)  Outcome: Progressing  Intervention: Support and Promote Cognitive Ability  Recent Flowsheet Documentation  Taken 2/19/2023 1000 by Elva Parkinson RN  Trust Relationship/Rapport:   Goal Outcome Evaluation:    Plan of Care Reviewed With: patient      Patient has been calm and visible in milieu. He reports being in a  good mood, denies symptoms of anxiety, depression and SI.SIB. Patient continues to endorses chronic auditory hallucinations which he states are not bothersome and have decreased. Patient is medication complaint, denies side effects.

## 2023-02-20 ENCOUNTER — APPOINTMENT (OUTPATIENT)
Dept: INTERPRETER SERVICES | Facility: CLINIC | Age: 53
End: 2023-02-20
Payer: MEDICAID

## 2023-02-20 PROCEDURE — 124N000003 HC R&B MH SENIOR/ADOLESCENT

## 2023-02-20 PROCEDURE — 250N000013 HC RX MED GY IP 250 OP 250 PS 637: Performed by: NURSE PRACTITIONER

## 2023-02-20 PROCEDURE — 99232 SBSQ HOSP IP/OBS MODERATE 35: CPT | Performed by: NURSE PRACTITIONER

## 2023-02-20 PROCEDURE — G0177 OPPS/PHP; TRAIN & EDUC SERV: HCPCS

## 2023-02-20 PROCEDURE — H2032 ACTIVITY THERAPY, PER 15 MIN: HCPCS

## 2023-02-20 RX ADMIN — OLANZAPINE 20 MG: 10 TABLET, FILM COATED ORAL at 20:49

## 2023-02-20 ASSESSMENT — ACTIVITIES OF DAILY LIVING (ADL)
HYGIENE/GROOMING: INDEPENDENT
ADLS_ACUITY_SCORE: 32
LAUNDRY: WITH SUPERVISION
DRESS: INDEPENDENT
ORAL_HYGIENE: INDEPENDENT
ORAL_HYGIENE: INDEPENDENT
ADLS_ACUITY_SCORE: 32
LAUNDRY: UNABLE TO COMPLETE
DRESS: INDEPENDENT;SCRUBS (BEHAVIORAL HEALTH)
ADLS_ACUITY_SCORE: 32
ADLS_ACUITY_SCORE: 32
HYGIENE/GROOMING: INDEPENDENT
ADLS_ACUITY_SCORE: 32
ADLS_ACUITY_SCORE: 32

## 2023-02-20 NOTE — PLAN OF CARE
Problem: Psychotic Signs/Symptoms  Goal: Improved Sleep (Psychotic Signs/Symptoms)  Outcome: Progressing   Goal Outcome Evaluation:                    Slept on and off tonight for 8.5 hours  No concerns raised.  Will continue to monitor.

## 2023-02-20 NOTE — PLAN OF CARE
02/20/23 1503   Group Therapy Session   Group Attendance attended group session   Time Session Began 1015   Time Session Ended 1110   Total Time (minutes) 50   Total # Attendees 4   Group Type psychotherapeutic   Group Topic Covered coping skills/lifestyle management;problem-solving;balanced lifestyle;self-care activities;cognitive activities   Group Session Detail Occupational Therapy Clinic group to facilitate coping skill exploration, use of cognitive skills and problem solving, creative expression, clinical observation and facilitation of social, cognitive, and kinesthetic performance skills.    Patient Response/Contribution cooperative with task;listened actively;organized   Patient Participation Detail Worked at a constant pace on a familiar highly detailed task. Pleasant on approach though answers were brief. He spent time perfecting details with his work.  was present.

## 2023-02-20 NOTE — PLAN OF CARE
02/20/23 1507   Group Therapy Session   Group Attendance attended group session   Time Session Began 1110   Time Session Ended 1200   Total Time (minutes) 50   Total # Attendees 4   Group Type expressive therapy;task skill   Group Topic Covered coping skills/lifestyle management;cognitive therapy techniques;balanced lifestyle;self-care activities;structured socialization   Group Session Detail Participated in an activity focused on identifying helpful ideas for calming using the 5 senses, and practicing a grounding technique with this focus.    Patient Response/Contribution cooperative with task;listened actively   Patient Participation Detail Participated through the , answering questions addressed directly to him on topic. Offered answers, again, brief in content though in context.

## 2023-02-20 NOTE — PROGRESS NOTES
"PSYCHIATRY  PROGRESS NOTE     DATE OF SERVICE   02/20/2023           CHIEF COMPLAINT   \"I'm doing better\"       SUBJECTIVE   Nursing reports:    Patient appears calm and and been attending group activities with the help of . He states he is doing well, denies any anxiety, depression, and SI/SIB. Patient continues to report chronic auditory hallucinations telling him to not stay in one place. He states the hallucinations have decreased significantly and are not bothersome. Food and fluid intake are adequate, denies any pain or discomfort.      reports:   -Reviewed pt chart and documentation  -Called [facility], for long-term housing consideration ([phone]): Was asked to call back after 10:00am.  -Awaiting feedback  -[facility] is reporting that they do not have a current opening for IRTS. Pt has not been accepted into the program due to financial information confirmation difficulties.     Plan:   To verify plan to discharge the patient tomorrow with [facility]. Once a bed is confirmed pt will be ready for discharge tomorrow. Plan is for him to discharge to the shelter while waiting for IRTS.      Discharge Plan or Goal  Plan is for the pt to be discharged to an IRTS  Pt can go to a crisis bed while waiting for IRTS       OBJECTIVE   Chart reviewed. Treatment team attended this a.m. patient interviewed through use of an .  Patient appears calm and polite during our interaction.  Patient reports continued decrease in frequency/intensity of auditory hallucinations.  Patient denies that they are commanding him to engage in suicidal/homocidal acts. Pt aware of potential to discharge to [facility] tomorrow. Pt states he plans to remain compliant with medications upon discharge. Pt is good candidate for [facility] in the future/outpatient appointments. Pt denies suicidal/homocidial ideation/plan/intent. Patient has been willing to attend available " "programming with  staff. Pt denies physical complaints/pain.        MEDICATIONS   Medications:  Scheduled Meds:    OLANZapine  20 mg Oral At Bedtime     Continuous Infusions:  PRN Meds:.acetaminophen, alum & mag hydroxide-simethicone, hydrOXYzine, ibuprofen, OLANZapine **OR** OLANZapine, senna-docusate, traZODone    Medication adherence issues: MS Med Adherence Y/N: No  Medication side effects: MEDICATION SIDE EFFECTS: no side effects reported  Benefit: Yes / No: Yes       ROS   Pertinent items are noted in HPI.       MENTAL STATUS EXAM   Vitals: /85   Pulse 83   Temp 98.2  F (36.8  C) (Temporal)   Resp 16   Ht 1.626 m (5' 4\")   Wt 79.4 kg (175 lb 0.7 oz)   SpO2 98%   BMI 30.05 kg/m      Appearance:  No apparent distress  Mood:  {Mood: Euthymic  Affect: flat  was congruent to speech  Suicidal Ideation: PRESENT / ABSENT: absent   Homicidal Ideation: PRESENT / ABSENT: absent   Thought process: superficial; appears internally preoccupied   Thought content: devoid  suicidal and violent ideation.   Fund of Knowledge: Average  Attention/Concentration: Normal  Language ability:  Intact  Memory:  Immediate recall intact  Insight: limited   Judgement: fair  Orientation: Yes, x4  Psychomotor Behavior: normal or unremarkable    Muscle Strength and Tone: MuscleStrength: Normal  Gait and Station: Normal       LABS   personally reviewed.     No results found for: PHENYTOIN, PHENOBARB, VALPROATE, CBMZ       DIAGNOSIS   Principal Problem:    Schizophrenia, schizoaffective, chronic with acute exacerbation (H)    Active Problem List:  Patient Active Problem List   Diagnosis     Schizophrenia, schizoaffective, chronic with acute exacerbation (H)          PLAN   Plan as per Dr. Sergey MD dated 2/15/23:     1. Ongoing education given regarding diagnostic and treatment options with risks, benefits and alternatives and adequate verbalization of understanding.  2.  Medications      Zyprexa to 15 mg at bedtime "      3.  Medical team to follow the patient as needed.  4.   coordinating a safe discharge plan with the patient.    Psychiatric cross coverage provided by SERGEY Friedman CNP on 2/16/2023 at 1:17 PM  2/16/23: Continue with current treatment plan.   2/17/23: Increase Zyprexa to 20 mg q HS.   2/20/23: Continue with current treatment plan. No changes. Dr. Rincon to return tomorrow to resume care.     Risk Assessment: [facility] RISK ASSESSMENT: Patient able to contract for safety    Coordination of Care:   Treatment Plan reviewed and physician signed, Care discussed with Care/Treatment Team Members, Chart reviewed and Patient seen      Re-Certification I certify that the inpatient psychiatric facility services furnished since the previous certification were, and continue to be, medically necessary for, either, treatment which could reasonably be expected to improve the patient s condition or diagnostic study and that the hospital records indicate that the services furnished were, either, intensive treatment services, admission and related services necessary for diagnostic study, or equivalent services.     I certify that the patient continues to need, on a daily basis, active treatment furnished directly by or requiring the supervision of inpatient psychiatric facility personnel.   I estimate 1-2 days of hospitalization is necessary for proper treatment of the patient. My plans for post-hospital care for this patient are [facility]/crisis bed.      SERGEY Friedman CNP    -     02/20/2023  -     11:13 AM    Total time  35 minutes with > 50%spent on coordination of cares and psycho-education.    This note was created with help of Dragon dictation system. Grammatical / typing errors are not intentional.    SERGEY Friedman CNP

## 2023-02-20 NOTE — PLAN OF CARE
Problem: Psychotic Signs/Symptoms  Goal: Optimal Cognitive Function (Psychotic Signs/Symptoms)  Outcome: Progressing  Intervention: Support and Promote Cognitive Ability  Recent Flowsheet Documentation  Taken 2/20/2023 1000 by Elva Parkinson RN  Trust Relationship/Rapport:   Goal Outcome Evaluation:    Plan of Care Reviewed With: patient      Patient appears calm and and been attending group activities with the help of . He states he is doing well, denies any anxiety, depression, and SI/SIB. Patient continues to report chronic auditory hallucinations telling him to not stay in one place. He states the hallucinations have decreased significantly and are not bothersome. Food and fluid intake are adequate, denies any pain or discomfort.

## 2023-02-20 NOTE — PLAN OF CARE
Assessment/Intervention/Current Symtoms and Care Coordination  -Reviewed pt chart and documentation  -Called [facility], for long-term housing consideration ([phone]): Was asked to call back after 10:00am.  -Awaiting feedback  -[facility] is reporting that they do not have a current opening for IRTS. Pt has not been accepted into the program due to financial information confirmation difficulties.     Plan:   To verify plan to discharge the patient tomorrow with [facility]. Once a bed is confirmed pt will be ready for discharge tomorrow. Plan is for him to discharge to the shelter while waiting for IRTS.      Discharge Plan or Goal  Plan is for the pt to be discharged to an IRTS  Pt can go to a crisis bed while waiting for IRTS        Barriers to Discharge   -Barrier to discharge is due to funding issues - pt is on emergency MA and [facility] is requesting specific information. Financial services department reported being unable to provide the information. Have called [facility] several times to request the information but also awaiting feedback.     Referral Status  IRTS, Crisis Bed, [facility]:  [facility]: 30-90 day stay  [address] [place] [address]  Phone: [phone]  Legal Status  Voluntary

## 2023-02-21 ENCOUNTER — APPOINTMENT (OUTPATIENT)
Dept: INTERPRETER SERVICES | Facility: CLINIC | Age: 53
End: 2023-02-21
Payer: MEDICAID

## 2023-02-21 PROCEDURE — G0177 OPPS/PHP; TRAIN & EDUC SERV: HCPCS

## 2023-02-21 PROCEDURE — 250N000013 HC RX MED GY IP 250 OP 250 PS 637: Performed by: NURSE PRACTITIONER

## 2023-02-21 PROCEDURE — 124N000003 HC R&B MH SENIOR/ADOLESCENT

## 2023-02-21 PROCEDURE — H2032 ACTIVITY THERAPY, PER 15 MIN: HCPCS

## 2023-02-21 PROCEDURE — 99231 SBSQ HOSP IP/OBS SF/LOW 25: CPT | Performed by: PSYCHIATRY & NEUROLOGY

## 2023-02-21 RX ORDER — OLANZAPINE 20 MG/1
20 TABLET ORAL AT BEDTIME
Qty: 30 TABLET | Refills: 0 | Status: SHIPPED | OUTPATIENT
Start: 2023-02-21

## 2023-02-21 RX ADMIN — OLANZAPINE 20 MG: 10 TABLET, FILM COATED ORAL at 20:12

## 2023-02-21 ASSESSMENT — ACTIVITIES OF DAILY LIVING (ADL)
ADLS_ACUITY_SCORE: 32
DRESS: INDEPENDENT
ADLS_ACUITY_SCORE: 32
ORAL_HYGIENE: INDEPENDENT
ORAL_HYGIENE: INDEPENDENT
HYGIENE/GROOMING: INDEPENDENT
ADLS_ACUITY_SCORE: 32
ADLS_ACUITY_SCORE: 32
LAUNDRY: WITH SUPERVISION
ADLS_ACUITY_SCORE: 32
HYGIENE/GROOMING: INDEPENDENT
DRESS: INDEPENDENT
ADLS_ACUITY_SCORE: 32

## 2023-02-21 NOTE — PLAN OF CARE
Problem: Psychotic Signs/Symptoms  Goal: Improved Sleep (Psychotic Signs/Symptoms)  Outcome: Progressing   Goal Outcome Evaluation:                  Slept well for 9.75 hours  All precautions maintained  Possible discharge to a Shelter while waiting for IRTS placement

## 2023-02-21 NOTE — PLAN OF CARE
Plan: call [facility] shelter, for long-term housing consideration ([phone]) at 10:00am    Connect [facility] for primary care with  and the patient.

## 2023-02-21 NOTE — PLAN OF CARE
"Occupational Therapy     02/21/23 1100   Group Therapy Session   Group Attendance attended group session   Time Session Began 1015   Time Session Ended 1115   Total Time (minutes) 45   Total # Attendees 4   Group Type expressive therapy   Group Topic Covered balanced lifestyle;coping skills/lifestyle management;self-care activities;structured socialization;relaxation techniques   Group Session Detail OT: Education on the 7 Senses and identification of sensory strategies to increase insight development, coping skill identification, recovery planning, symptom management, coping with stress, sharing information about self, listening to others, social engagement, and overall wellness   Patient Response/Contribution cooperative with task;listened actively;other (see comments)  (semi-guarded)   Patient Participation Detail Pt reported during check-in he selected his current status as \"surviving\". Pt sat among peers for presented activity but did not engage in social interactions with peers. Pt reported during group that he finds \"relaxing music\" an auditory strategy to help calm him down; pt declined to elaborate when therapist asked to give a more detailed response about what music he finds relaxing. Pt pulled by CTC and returned. Pt reported that uncomfortable \"dreams\" are alerting to him; therapist encouraged pt to discuss unsettling dreams his provider. Pt overall appeared semi-guarded as his responses were brief and lacked detail. Pt pulled by provider and did not return.  present for duration.          "

## 2023-02-21 NOTE — PLAN OF CARE
Assessment/Intervention/Current Symtoms and Care Coordination  -Reviewed pt chart and documentation  -Called [facility], for long-term housing consideration ([phone]): Was asked to call back after 10:00am.  -Awaiting feedback. Writer called back and was informed to call tomorrow morning.  -[facility] is reporting that they do not have a current opening for IRTS. Pt has not been accepted into the program due to financial information confirmation difficulties.     Plan:   To verify plan to discharge the patient tomorrow with [facility]. Once a bed is confirmed pt will be ready for discharge tomorrow. Plan is for him to discharge to the shelter while waiting for IRTS.      Discharge Plan or Goal  Plan is for the pt to be discharged to an IRTS  Pt can go to a crisis bed while waiting for IRTS     Barriers to Discharge   -Barrier to discharge is due to funding issues - pt is on emergency MA and [employer] [facility] is requesting specific information. Financial services department reported being unable to provide the information. Have called [facility] several times to request the information but also awaiting feedback.     Referral Status  IRTS, Crisis Bed, [facility]:  [facility]: 30-90 day stay  [address] [place] [address]  Phone: [phone]  Legal Status  Voluntary

## 2023-02-21 NOTE — PLAN OF CARE
Problem: Plan of Care - These are the overarching goals to be used throughout the patient stay.    Goal: Plan of Care Review  Description: The Plan of Care Review/Shift note should be completed every shift.  The Outcome Evaluation is a brief statement about your assessment that the patient is improving, declining, or no change.  This information will be displayed automatically on your shift note.  Outcome: Progressing  Flowsheets (Taken 2/20/2023 2134)  Plan of Care Reviewed With: patient  Overall Patient Progress: improving     Patient was present in the milieu but not social with peers. Flat and blunted affect. MH assessment completed using the telephone . Denied SI, SIB, depression and anxiety. He endorsed auditory hallucination, hearing voices not to stay in one place. Pt ate well at supper. He was compliant with taking his scheduled Zyprexa. No complaints of pain and side effects of medications. No other concerns noted.

## 2023-02-21 NOTE — PLAN OF CARE
Problem: Psychotic Signs/Symptoms  Goal: Optimal Cognitive Function (Psychotic Signs/Symptoms)  Outcome: Progressing  Intervention: Support and Promote Cognitive Ability  Recent Flowsheet Documentation  Taken 2/21/2023 1000 by Elva Parkinson RN  Trust Relationship/Rapport:   Goal Outcome Evaluation:    Plan of Care Reviewed With: patient      Patient was calm and attending group activities. He denies symptoms of anxiety, depression and SI/SIB. He states he is doing well. Pt continues to report chronic auditory hallucinations which he stats has improved with mediations. Patient is eating and drinking adequately, denies any pain and is complaint with medications.

## 2023-02-21 NOTE — PROGRESS NOTES
02/20/23 1800   Group Therapy Session   Time Session Began 1315   Time Session Ended 1400   Total Time (minutes) 45   Total # Attendees 2-3   Group Type expressive therapy   Group Topic Covered balanced lifestyle;relaxation techniques   Group Session Detail Afternoon Relaxation   Patient Response/Contribution cooperative with task   Patient Participation Detail Cooperatively engaged in Music Relaxation group to decrease anxiety and promote grounding and centering.  Calm affect, appropriately engaged in session, responding well to the music.  Tapped his toe to the beat of the music at times, showing engagement.  Easy-going affect, participates more at a surface level.   present and assisting in communications.

## 2023-02-21 NOTE — PROGRESS NOTES
02/21/23 1500   Group Therapy Session   Group Attendance attended group session   Time Session Began 1115   Time Session Ended 1200   Total Time (minutes) 45   Total # Attendees 2   Group Type expressive therapy   Group Topic Covered emotions/expression   Patient Response/Contribution cooperative with task     Art Therapy directive was to create a drawing of a safe place and to identify five items within safe place that represent each of the five senses.  Goals of directive: trauma containment, emotional expression, emotional regulation  Pt was an engaged participant, focused on task for the full duration of group.  Pt finished a detailed pencil drawing and briefly shared with group. Pt created a landscape which he shared was inspired by his own life. Pt elis a house with children playing soccer, mountains, birds singing in the trees and children flying kites. Pt described items representing each of the five senses.  Pts mood was calm, pleasant participant.

## 2023-02-22 LAB — SARS-COV-2 RNA RESP QL NAA+PROBE: NEGATIVE

## 2023-02-22 PROCEDURE — G0177 OPPS/PHP; TRAIN & EDUC SERV: HCPCS

## 2023-02-22 PROCEDURE — 250N000013 HC RX MED GY IP 250 OP 250 PS 637: Performed by: NURSE PRACTITIONER

## 2023-02-22 PROCEDURE — 124N000003 HC R&B MH SENIOR/ADOLESCENT

## 2023-02-22 PROCEDURE — U0005 INFEC AGEN DETEC AMPLI PROBE: HCPCS | Performed by: PSYCHIATRY & NEUROLOGY

## 2023-02-22 RX ADMIN — OLANZAPINE 20 MG: 10 TABLET, FILM COATED ORAL at 20:38

## 2023-02-22 ASSESSMENT — ACTIVITIES OF DAILY LIVING (ADL)
ADLS_ACUITY_SCORE: 32
ORAL_HYGIENE: INDEPENDENT
ADLS_ACUITY_SCORE: 32
DRESS: INDEPENDENT
ADLS_ACUITY_SCORE: 32
DRESS: INDEPENDENT
LAUNDRY: WITH SUPERVISION
ADLS_ACUITY_SCORE: 32
HYGIENE/GROOMING: INDEPENDENT
ADLS_ACUITY_SCORE: 32
HYGIENE/GROOMING: INDEPENDENT
LAUNDRY: WITH SUPERVISION
ORAL_HYGIENE: INDEPENDENT

## 2023-02-22 NOTE — PLAN OF CARE
02/21/23 2052   Group Therapy Session   Group Attendance attended group session   Time Session Began 1300   Time Session Ended 1355   Total Time (minutes) 55   Total # Attendees 4   Group Type psychotherapeutic   Group Topic Covered balanced lifestyle;structured socialization   Group Session Detail Activity focused on Brain stimulating topics and questions.    Patient Response/Contribution cooperative with task;listened actively;discussed personal experience with topic   Patient Participation Detail Offered answers in context and with more elaboration when asked for additional information through the . Affect appeared flat. Stated thinking he was going to be d/c today. Offered an elaboration in information that was correct, related to specific details a peer talked about. Even though quiet, seems to be processing and remembering information.

## 2023-02-22 NOTE — PLAN OF CARE
Plan: Will call [place] today to request to see if they can guarantee a long term placement with a specific date. Will contact [facility] regarding emergency MA and coordinate with [facility].

## 2023-02-22 NOTE — PLAN OF CARE
02/22/23 1256   Group Therapy Session   Group Attendance attended group session   Time Session Began 1015   Time Session Ended 1200   Total Time (minutes) 90   Total # Attendees 2   Group Type life skill;recreation;task skill   Group Topic Covered cognitive activities;leisure exploration/use of leisure time;problem-solving   Group Session Detail Occup[facility] group to facilitate coping skill exploration, use of cognitive skills and problem solving, creative expression, clinical observation and facilitation of social, cognitive, and kinesthetic performance skills.   Patient Response/Contribution cooperative with task   Patient Participation Detail pt chose to engage in familiar task for duration of group time. pt worked quietly and methodically for duration. pt answered questions via  with short answers. pt was independent with task and clean up

## 2023-02-22 NOTE — PLAN OF CARE
Problem: Psychotic Signs/Symptoms  Goal: Enhanced Social, Occupational or Functional Skills (Psychotic Signs/Symptoms)  Outcome: Progressing   Goal Outcome Evaluation:        Patient was calm and attended  group activities. He denies symptoms of anxiety, depression and SI/SIB. He states he is doing well.  Patient is eating and drinking adequately, denies any pain and is complaint with medications.      Patient has been discussed with the   and the team earlier today and was  scheduled to be discharged today to a shelter.   called  the shelter this morning to ensure the patient had a bed available ,  were informed that they no longer had space today. Discharge pending for now.

## 2023-02-22 NOTE — PROGRESS NOTES
"PSYCHIATRY  PROGRESS NOTE     DATE OF SERVICE   02/21/2023        CHIEF COMPLAINT   \" I am fine\".     SUBJECTIVE   Nursing reports:  Patient was calm and attending group activities. He denies symptoms of anxiety, depression and SI/SIB. He states he is doing well. Pt continues to report chronic auditory hallucinations which he stats has improved with mediations. Patient is eating and drinking adequately, denies any pain and is complaint with medications.      Patient has been discussed with the  earlier today.  We had the patient scheduled to be discharged today to a shelter.   discussed with me that when he called this morning to ensure the patient had a bed available they were informed that they no longer had space today.       OBJECTIVE   Patient was seen and evaluated in the consult room with  present during the assessment, medical students and this writer.  Interview was conducted in [place]ese by this writer.  Patient reported that he is doing well and that he was looking forward to be discharged (later on this discharge was canceled).  Patient feels that he will be safe in the shelter and at this time he denies any safety concerns.  We reviewed with the patient the importance of being compliant with the medications and to follow up with her outpatient providers.  At this time patient verbalized good understanding and is in agreement with the plan.    No lab results found in last 7 days.       MEDICATIONS   Medications:  Scheduled Meds:    OLANZapine  20 mg Oral At Bedtime     Continuous Infusions:  PRN Meds:.acetaminophen, alum & mag hydroxide-simethicone, hydrOXYzine, ibuprofen, OLANZapine **OR** OLANZapine, senna-docusate, traZODone    Medication adherence issues: MS Med Adherence Y/N: Yes, Hospitalization  Medication side effects: MEDICATION SIDE EFFECTS: no side effects reported  Benefit: Yes / No: Yes       ROS   A comprehensive review of systems was negative.   " "    MENTAL STATUS EXAM   Vitals: /83   Pulse 86   Temp 98  F (36.7  C) (Temporal)   Resp 16   Ht 1.626 m (5' 4\")   Wt 79.6 kg (175 lb 7.8 oz)   SpO2 96%   BMI 30.12 kg/m      Same as last visit  Appearance:  No apparent distress  Mood: \"I am doing good\"  Affect: full range and bright was congruent to speech  Suicidal Ideation: PRESENT / ABSENT: absent   Homicidal Ideation: PRESENT / ABSENT: absent   Thought process: [place]   Thought content: Denied having any auditory hallucinations at the moment of the assessment.  fund of Knowledge: Below average  Attention/Concentration: Fair  Language ability:  Intact  Memory:  Immediate recall impaired, Short-term memory impaired and Long-term memory intact  Insight:  fair.  Judgement: fair  Orientation: Yes, x4  Psychomotor Behavior: normal or unremarkable    Muscle Strength and Tone: MuscleStrength: Normal  Gait and Station: Normal       LABS   personally reviewed.   No results found for this or any previous visit (from the past 24 hour(s)).  No lab results found in last 7 days.  No results found for: PHENYTOIN, PHENOBARB, VALPROATE, CBMZ       DIAGNOSIS   Principal Problem:    Schizophrenia, schizoaffective, chronic with acute exacerbation (H)    Active Problem List:  Patient Active Problem List   Diagnosis     Schizophrenia, schizoaffective, chronic with acute exacerbation (H)          PLAN   1. Ongoing education given regarding diagnostic and treatment options with risks, benefits and alternatives and adequate verbalization of understanding.  2.  Medications      Zyprexa to 20 mg at bedtime     3.  Medical team to follow the patient as needed.  4.   coordinating a safe discharge plan with the patient.      Risk Assessment: [facility] RISK ASSESSMENT: Patient able to contract for safety    Coordination of Care:   Treatment Plan reviewed and physician signed, Care discussed with Care/Treatment Team Members, Chart reviewed and Patient " seen      Re-Certification I certify that the inpatient psychiatric facility services furnished since the previous certification were, and continue to be, medically necessary for, either, treatment which could reasonably be expected to improve the patient s condition or diagnostic study and that the hospital records indicate that the services furnished were, either, intensive treatment services, admission and related services necessary for diagnostic study, or equivalent services.     I certify that the patient continues to need, on a daily basis, active treatment furnished directly by or requiring the supervision of inpatient psychiatric facility personnel.   I estimate 14 days of hospitalization is necessary for proper treatment of the patient. My plans for post-hospital care for this patient are  TBD     Paris Mccauley MD    -     02/21/2023  -     10:06 PM    Total time 25 minutes with > 50%spent on coordination of cares and psycho-education.    This note was created with help of Dragon dictation system. Grammatical / typing errors are not intentional.    Paris Mccauley MD
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
## 2023-02-22 NOTE — PLAN OF CARE
Problem: Psychotic Signs/Symptoms  Goal: Optimal Cognitive Function (Psychotic Signs/Symptoms)  Outcome: Progressing  Intervention: Support and Promote Cognitive Ability  Recent Flowsheet Documentation  Taken 2/22/2023 1300 by Elva Parkinson RN  Trust Relationship/Rapport:   Goal Outcome Evaluation:    Plan of Care Reviewed With: patient      Patient presents in calm mood, has been attending and participating in group activities with the help of . He denies anxiety, depression, SI/SIB. He continues to report chronic auditory hallucinations telling him to not stay in one place. Patient is eating and drinking adequately, is independent with ADLS and denies any pain or discomfort.

## 2023-02-23 ENCOUNTER — APPOINTMENT (OUTPATIENT)
Dept: INTERPRETER SERVICES | Facility: CLINIC | Age: 53
End: 2023-02-23
Payer: MEDICAID

## 2023-02-23 PROCEDURE — 250N000013 HC RX MED GY IP 250 OP 250 PS 637: Performed by: NURSE PRACTITIONER

## 2023-02-23 PROCEDURE — 124N000003 HC R&B MH SENIOR/ADOLESCENT

## 2023-02-23 PROCEDURE — G0177 OPPS/PHP; TRAIN & EDUC SERV: HCPCS

## 2023-02-23 PROCEDURE — 99231 SBSQ HOSP IP/OBS SF/LOW 25: CPT | Mod: 95 | Performed by: PSYCHIATRY & NEUROLOGY

## 2023-02-23 RX ADMIN — OLANZAPINE 20 MG: 10 TABLET, FILM COATED ORAL at 19:58

## 2023-02-23 ASSESSMENT — ACTIVITIES OF DAILY LIVING (ADL)
ADLS_ACUITY_SCORE: 32
ORAL_HYGIENE: INDEPENDENT
ADLS_ACUITY_SCORE: 32
ADLS_ACUITY_SCORE: 32
DRESS: INDEPENDENT;SCRUBS (BEHAVIORAL HEALTH)
ADLS_ACUITY_SCORE: 32
ORAL_HYGIENE: INDEPENDENT
DRESS: INDEPENDENT
ADLS_ACUITY_SCORE: 32
HYGIENE/GROOMING: INDEPENDENT
HYGIENE/GROOMING: INDEPENDENT
LAUNDRY: UNABLE TO COMPLETE
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
## 2023-02-23 NOTE — PLAN OF CARE
02/23/23 1424   Group Therapy Session   Group Attendance attended group session   Time Session Began 1315   Time Session Ended 1400   Total Time (minutes) 45   Total # Attendees 3   Group Type task skill;recreation   Group Topic Covered problem-solving;cognitive activities;balanced lifestyle;leisure exploration/use of leisure time   Patient Participation Detail Structured occupational therapy group with a focus on a visual-spatial leisure task. Pt response:  present and utilized throughout the duration of group. Pt reported familiarity with this visual-spatial activity. He was able to follow 2-step directions of the task. Pt was observed to be planning in between turns. Pt had one brief moment where he lost track of whose turn it was, but was quick to make humor of the siltation. Focus was good. Pt demonstrated good patience and tolerance r/t disruptive peer.

## 2023-02-23 NOTE — PLAN OF CARE
Problem: Adult Behavioral Health Plan of Care  Goal: Adheres to Safety Considerations for Self and Others  Intervention: Develop and Maintain Individualized Safety Plan  Recent Flowsheet Documentation  Taken 2/23/2023 1435 by Nola Swartz RN  Safety Measures: safety rounds completed     Problem: Adult Behavioral Health Plan of Care  Goal: Absence of New-Onset Illness or Injury  Intervention: Identify and Manage Fall Risk  Recent Flowsheet Documentation  Taken 2/23/2023 1435 by Nola Swartz RN  Safety Measures: safety rounds completed     Problem: Psychotic Signs/Symptoms  Goal: Increased Participation and Engagement (Psychotic Signs/Symptoms)  Intervention: Facilitate Participation and Engagement  Recent Flowsheet Documentation  Taken 2/23/2023 1435 by Nola Swartz RN  Diversional Activity: television     Problem: Psychotic Signs/Symptoms  Goal: Improved Mood Symptoms  Intervention: Optimize Emotion and Mood  Recent Flowsheet Documentation  Taken 2/23/2023 1435 by Nola Swartz RN  Diversional Activity: television     Problem: Plan of Care - These are the overarching goals to be used throughout the patient stay.    Goal: Absence of Hospital-Acquired Illness or Injury  Intervention: Identify and Manage Fall Risk  Recent Flowsheet Documentation  Taken 2/23/2023 1435 by Nola Swartz RN  Safety Promotion/Fall Prevention: check orthostatic blood pressure   Goal Outcome Evaluation:    Plan of Care Reviewed With: patient      Pt has been pleasant and cooperative. Writer met with interpretor for 1:1.   Pt denies SI/SIB/anxiety/depression. Pt reports auditory hallucinations but are very faint and not bothersome. Pt has attended groups. Pt has been eating well and reports adequate sleep.  Pt has denied pain.

## 2023-02-23 NOTE — PLAN OF CARE
Problem: Sleep Disturbance  Goal: Adequate Sleep/Rest  Outcome: Progressing  Intervention: Promote Sleep/Rest  Flowsheets (Taken 2/23/2023 0115)  Sleep/Rest Enhancement: regular sleep/rest pattern promoted     Patient appeared to sleep 12 hours. No concerns noted this shift. No PRN medications given.

## 2023-02-23 NOTE — PLAN OF CARE
CTC: Called [facility] ([phone]) to see if they could make a reservation for the patient against tomorrow for long-term housing. Was informed that writer will need to call tomorrow.

## 2023-02-23 NOTE — PLAN OF CARE
Problem: Psychotic Signs/Symptoms  Goal: Optimal Cognitive Function (Psychotic Signs/Symptoms)  2/22/2023 2222 by Elva Parkinson RN  Outcome: Progressing  2/22/2023 1415 by Elva Parkinson RN  Outcome: Progressing  Intervention: Support and Promote Cognitive Ability  Recent Flowsheet Documentation  Taken 2/22/2023 2100 by Elva Parkinson RN  Trust Relationship/Rapport:   care explained   choices provided   emotional support provided   empathic listening provided   questions answered   questions encouraged   reassurance provided   thoughts/feelings acknowledged  Taken 2/22/2023 1300 by Elva Parkinson RN  Trust Relationship/Rapport:   care explained   Goal Outcome Evaluation:    Plan of Care Reviewed With: patient      Patient is calm and visible in the milieu. He reports being in a good mood, denies symptoms of anxiety, depression and SI/SIB. He reports chronic auditory hallucinations telling to not stay in one place but states they have decreased and he can ignore them. Patient eats 100% of his meals, is complaint with medications, denies any pain or discomfort.

## 2023-02-24 ENCOUNTER — APPOINTMENT (OUTPATIENT)
Dept: INTERPRETER SERVICES | Facility: CLINIC | Age: 53
End: 2023-02-24
Payer: MEDICAID

## 2023-02-24 LAB — SARS-COV-2 RNA RESP QL NAA+PROBE: NEGATIVE

## 2023-02-24 PROCEDURE — H2032 ACTIVITY THERAPY, PER 15 MIN: HCPCS

## 2023-02-24 PROCEDURE — 250N000013 HC RX MED GY IP 250 OP 250 PS 637: Performed by: NURSE PRACTITIONER

## 2023-02-24 PROCEDURE — U0003 INFECTIOUS AGENT DETECTION BY NUCLEIC ACID (DNA OR RNA); SEVERE ACUTE RESPIRATORY SYNDROME CORONAVIRUS 2 (SARS-COV-2) (CORONAVIRUS DISEASE [COVID-19]), AMPLIFIED PROBE TECHNIQUE, MAKING USE OF HIGH THROUGHPUT TECHNOLOGIES AS DESCRIBED BY CMS-2020-01-R: HCPCS | Performed by: PSYCHIATRY & NEUROLOGY

## 2023-02-24 PROCEDURE — G0177 OPPS/PHP; TRAIN & EDUC SERV: HCPCS

## 2023-02-24 PROCEDURE — 124N000003 HC R&B MH SENIOR/ADOLESCENT

## 2023-02-24 RX ADMIN — OLANZAPINE 20 MG: 10 TABLET, FILM COATED ORAL at 20:52

## 2023-02-24 ASSESSMENT — ACTIVITIES OF DAILY LIVING (ADL)
ORAL_HYGIENE: INDEPENDENT
LAUNDRY: UNABLE TO COMPLETE
ADLS_ACUITY_SCORE: 32
DRESS: INDEPENDENT
ADLS_ACUITY_SCORE: 32
HYGIENE/GROOMING: INDEPENDENT
ADLS_ACUITY_SCORE: 32

## 2023-02-24 NOTE — PLAN OF CARE
Problem: Sleep Disturbance  Goal: Adequate Sleep/Rest  Outcome: Progressing   Goal Outcome Evaluation:    Patient slept for a total of 9.25 hours. No behavioral issues raised the whole shift.

## 2023-02-24 NOTE — PLAN OF CARE
02/24/23 1234   Group Therapy Session   Group Attendance attended group session   Time Session Began 1015   Time Session Ended 1110   Total Time (minutes) 50   Total # Attendees 2   Group Type expressive therapy;task skill;psychotherapeutic   Group Topic Covered coping skills/lifestyle management;problem-solving;cognitive activities;structured socialization;balanced lifestyle   Group Session Detail Occupational Therapy [facility] group to facilitate coping skill exploration, use of cognitive skills and problem solving, creative expression, clinical observation and facilitation of social, cognitive, and kinesthetic performance skills.    Patient Response/Contribution cooperative with task;listened actively;organized   Patient Participation Detail Worked at a constant pace on a less complex 1 step task. Made decisions and was attentive to detail. Accepted the offer to work on a more complex task, requiring more and complex problem solving and planning. Began the steps today and followed through with beginning steps. Will assess next session if he remembers directions. Pleasant and elaborated some on answers through the . He also explained and taught a game to someone in the room with clear explanations and details through the ..

## 2023-02-24 NOTE — PLAN OF CARE
CTC: Checked the status of pt's referral for case management with [facility]. Awaiting response.  Called [facility] and was told they only have availability for an overnight shelter today.:[facility] Direct Line: [phone]  Called [facility] and was informed that all that is available today is overnight shelter (Also reported they are available during the weekends from 1:00pm): Phone: [phone]

## 2023-02-24 NOTE — PLAN OF CARE
Problem: Depression  Goal: Improved Mood  Outcome: Progressing   Goal Outcome Evaluation:         Pt visible in the milieu and withdrawn to self, denies pain or discomfort, eating and drinking well, remains med compliant.

## 2023-02-24 NOTE — PLAN OF CARE
02/24/23 1238   Group Therapy Session   Group Attendance attended group session   Time Session Began 1110   Time Session Ended 1200   Total Time (minutes) 50   Total # Attendees 2   Group Type psychotherapeutic   Group Topic Covered balanced lifestyle;cognitive therapy techniques;structured socialization   Group Session Detail Attended and participated in a group focused on Gratitude, identifying people, things to be grateful for and what having gratitude provides them and discussing whether this perspective has an impact for him   Patient Response/Contribution cooperative with task;listened actively;organized;discussed personal experience with topic;expressed understanding of topic   Patient Participation Detail Quick to offer thoughtful answers seeming to have processed content to before answering through the . He stated being thankful for the staff here and feels so grateful for this. He explained appreciating people who want to get to know him and be friends or friendly with him. He stated being grateful helps him feel better and has a great deal of gratitude everyday. Offered more brief moments of direct eye contact when offering answers.

## 2023-02-24 NOTE — PLAN OF CARE
Problem: Adult Behavioral Health Plan of Care  Goal: Develops/Participates in Therapeutic [place] to Support Successful Transition  Outcome: Progressing   Goal Outcome Evaluation:       Pt alert, visible and remains med compliant, denies psych symptoms and participating in groups and in unit programming, no s/s discomfort noted, pt is Covid Negative.

## 2023-02-25 PROCEDURE — 99231 SBSQ HOSP IP/OBS SF/LOW 25: CPT | Performed by: PSYCHIATRY & NEUROLOGY

## 2023-02-25 PROCEDURE — 124N000003 HC R&B MH SENIOR/ADOLESCENT

## 2023-02-25 PROCEDURE — 250N000013 HC RX MED GY IP 250 OP 250 PS 637: Performed by: NURSE PRACTITIONER

## 2023-02-25 RX ADMIN — OLANZAPINE 20 MG: 10 TABLET, FILM COATED ORAL at 20:32

## 2023-02-25 ASSESSMENT — ACTIVITIES OF DAILY LIVING (ADL)
ADLS_ACUITY_SCORE: 32
DRESS: INDEPENDENT
ADLS_ACUITY_SCORE: 32
ORAL_HYGIENE: INDEPENDENT
ADLS_ACUITY_SCORE: 32
HYGIENE/GROOMING: INDEPENDENT
LAUNDRY: UNABLE TO COMPLETE
DRESS: INDEPENDENT
ADLS_ACUITY_SCORE: 32
HYGIENE/GROOMING: INDEPENDENT
ORAL_HYGIENE: INDEPENDENT
ADLS_ACUITY_SCORE: 32
LAUNDRY: UNABLE TO COMPLETE

## 2023-02-25 NOTE — PLAN OF CARE
"Goal Outcome Evaluation:    Plan of Care Reviewed With: patient      /86   Pulse 92   Temp 97.9  F (36.6  C) (Oral)   Resp 18   Ht 1.626 m (5' 4\")   Wt 79.6 kg (175 lb 7.8 oz)   SpO2 97%   BMI 30.12 kg/m      Pleasant and cooperative, mood is calm. Pt verbalized \"a little' auditory hallucinations. Pt eating and taking fluids. Pt denies questions/concerns at this time. Pt waiting placement    "

## 2023-02-25 NOTE — PLAN OF CARE
Problem: Sleep Disturbance  Goal: Adequate Sleep/Rest  Outcome: Progressing   Goal Outcome Evaluation:       Patient appeared asleep during the 15 mins rounds,  for safety. Slept for about 11hours. Patient was not noted to have used the bathroom or drink anything through the shift.

## 2023-02-25 NOTE — PLAN OF CARE
" 02/24/23 0940   Individualization/Patient Specific Goals   Patient Personal Strengths Cooperative with treatment   Patient Vulnerabilities Homeless. Multiple histories of psychosis, confusion. Has no social or community support. Language barriers   Anxieties, Fears or Concerns Worried that \"the voices would not go away.\" Worries about housing. Worries about having no support system   Interprofessional Rounds   Summary Pt was admitted due to concerns for his safety. 52 male who has been staying in shelters. He has at least one prior mental health admission and was admitted to [facility] in June of 2021 for similar presentation. He has more than one medical record due to inconsistent spelling of his last name ( Mackenzie vs Ordoñez) and confusion over whether Avalos is middle name or last name.    Pt will be engaged in the therapeutic milieu and groups where he will learn and practice positive coping skills to cope with his symptoms. Will be stabilized via medications and adjunctive interventions including psychotherapy, OT.     Currently, pt is being stabilized. He has been referred to IRTS after getting emergency MA.      Participants    CTC;nursing; psychiatrist   Behavioral Team Discussion   Participants       Chasidy Swanson APRN CNP; Merissa Ray RN  ; MARNIE Molina, Ph.D., [facility];Juliana Chopra, OT    Plan:      Consults: Hospitalist will be consulted if medical issues arise        Ongoing education given regarding diagnostic and treatment options with risks, benefits and alternatives and adequate verbalization of understanding.     Risk Assessment:  [facility] RISK ASSESSMENT: Patient on precautions      Progress Co-operative with treatment. Stabilization is still ongoing. Remains homeless. See medical note by Ehsan Hein PA-C.    Plan: We are continuing to seek placement. Stabilization continues.  Scheduled Meds:  Scheduled Meds:    OLANZapine  20 mg Oral At Bedtime      Continuous Infusions:  PRN " Meds:.acetaminophen, alum & mag hydroxide-simethicone, hydrOXYzine, ibuprofen, OLANZapine **OR** OLANZapine, senna-docusate, traZODone     Medication adherence issues: MS Med Adherence Y/N: Yes, Hospitalization  Medication side effects: MEDICATION SIDE EFFECTS: no side effects reported  Benefit: Yes / No: Yes               Medications:  Scheduled Meds:    OLANZapine  15 mg Oral At Bedtime      Continuous Infusions:  PRN Meds:.acetaminophen, alum & mag hydroxide-simethicone, hydrOXYzine, ibuprofen, OLANZapine **OR** OLANZapine, senna-docusate, traZODone     Medication adherence issues: MS Med Adherence Y/N: Yes, Hospitalization  Medication side effects: MEDICATION SIDE EFFECTS: no side effects reported  Benefit: Yes / No: Yes
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
## 2023-02-25 NOTE — PROGRESS NOTES
"PSYCHIATRY  PROGRESS NOTE     DATE OF SERVICE   02/25/2023          CHIEF COMPLAINT   \" I am fine\".     SUBJECTIVE   Nursing reports:  Plan of Care Reviewed With: patient       /86   Pulse 92   Temp 97.9  F (36.6  C) (Oral)   Resp 18   Ht 1.626 m (5' 4\")   Wt 79.6 kg (175 lb 7.8 oz)   SpO2 97%   BMI 30.12 kg/m       Pleasant and cooperative, mood is calm. Pt verbalized \"a little' auditory hallucinations. Pt eating and taking fluids. Pt denies questions/concerns at this time. Pt waiting placement         OBJECTIVE   Patient was seen and evaluated at bedside with nurse present during the assessment, this was a face-to-face evaluation.  Interview was conducted in Amharic by this writer.  Patient reported that he is doing fine and denies any other issues or concerns.  Patient stated that he has continued to experience auditory hallucinations but they are very minimal and these are not bothering him.  Denies any hallucinations at the moment of the assessment.  Continues to be in agreement with staying in the hospital.    No lab results found in last 7 days.       MEDICATIONS   Medications:  Scheduled Meds:    OLANZapine  20 mg Oral At Bedtime     Continuous Infusions:  PRN Meds:.acetaminophen, alum & mag hydroxide-simethicone, hydrOXYzine, ibuprofen, OLANZapine **OR** OLANZapine, senna-docusate, traZODone    Medication adherence issues: MS Med Adherence Y/N: Yes, Hospitalization  Medication side effects: MEDICATION SIDE EFFECTS: no side effects reported  Benefit: Yes / No: Yes       ROS   A comprehensive review of systems was negative.       MENTAL STATUS EXAM   Vitals: /71 (BP Location: Left arm, Patient Position: Sitting, Cuff Size: Adult Large)   Pulse 81   Temp 98.6  F (37  C) (Oral)   Resp 16   Ht 1.626 m (5' 4\")   Wt 79.6 kg (175 lb 7.8 oz)   SpO2 97%   BMI 30.12 kg/m      Same as last visit  Appearance:  No apparent distress  Mood: \"I am doing good\"  Affect: full range and bright was " congruent to speech  Suicidal Ideation: PRESENT / ABSENT: absent   Homicidal Ideation: PRESENT / ABSENT: absent   Thought process: [place]   Thought content: Denied having any auditory hallucinations at the moment of the assessment.  fund of Knowledge: Below average  Attention/Concentration: Fair  Language ability:  Intact  Memory:  Immediate recall impaired, Short-term memory impaired and Long-term memory intact  Insight:  fair.  Judgement: fair  Orientation: Yes, x4  Psychomotor Behavior: normal or unremarkable    Muscle Strength and Tone: MuscleStrength: Normal  Gait and Station: Normal       LABS   personally reviewed.   No results found for this or any previous visit (from the past 24 hour(s)).  No lab results found in last 7 days.  No results found for: PHENYTOIN, PHENOBARB, VALPROATE, CBMZ       DIAGNOSIS   Principal Problem:    Schizophrenia, schizoaffective, chronic with acute exacerbation (H)    Active Problem List:  Patient Active Problem List   Diagnosis     Schizophrenia, schizoaffective, chronic with acute exacerbation (H)          PLAN   1. Ongoing education given regarding diagnostic and treatment options with risks, benefits and alternatives and adequate verbalization of understanding.  2.  Medications      Zyprexa to 20 mg at bedtime     3.  Medical team to follow the patient as needed.  4.   coordinating a safe discharge plan with the patient.      Risk Assessment: [facility] RISK ASSESSMENT: Patient able to contract for safety    Coordination of Care:   Treatment Plan reviewed and physician signed, Care discussed with Care/Treatment Team Members, Chart reviewed and Patient seen      Re-Certification I certify that the inpatient psychiatric facility services furnished since the previous certification were, and continue to be, medically necessary for, either, treatment which could reasonably be expected to improve the patient s condition or diagnostic study and that the hospital records  indicate that the services furnished were, either, intensive treatment services, admission and related services necessary for diagnostic study, or equivalent services.     I certify that the patient continues to need, on a daily basis, active treatment furnished directly by or requiring the supervision of inpatient psychiatric facility personnel.   I estimate 14 days of hospitalization is necessary for proper treatment of the patient. My plans for post-hospital care for this patient are  TBD     Paris Mccauley MD    -     02/25/2023  -     5:36 PM    Total time 25 minutes with > 50%spent on coordination of cares and psycho-education.    This note was created with help of Dragon dictation system. Grammatical / typing errors are not intentional.    Paris Mccauley MD

## 2023-02-25 NOTE — PLAN OF CARE
" 02/10/23 1012   Individualization/Patient Specific Goals   Patient Personal Strengths Cooperative with treatment   Patient Vulnerabilities Homeless. Multiple histories of psychosis, confusion. Has no social or community support. Language barriers   Anxieties, Fears or Concerns Worried that \"the voices would not go away.\" Worries about housing. Worries about having no support system   Interprofessional Rounds   Summary Pt was admitted due to concerns for his safety. 52 male who has been staying in shelters. He has at least one prior mental health admission and was admitted to [facility] in June of 2021 for similar presentation. He has more than one medical record due to inconsistent spelling of his last name ( Mackenzie vs Ordoñez) and confusion over whether Avalos is middle name or last name.    Pt will be engaged in the therapeutic milieu and groups where he will learn and practice positive coping skills to cope with his symptoms. Will be stabilized via medications and adjunctive interventions including psychotherapy, OT...     Currently, pt is being stabilized. He has been referred to [facility]S after getting emergency MA.      Participants    CTC;nursing; psychiatrist   Behavioral Team Discussion   Participants       Dr. Paris Mccauley MD;Nola Swartz, LISSY  ; GAY Gutierres;Juliana Chopra OT  Plan:      Consults: Hospitalist will be consulted if medical issues arise        Ongoing education given regarding diagnostic and treatment options with risks, benefits and alternatives and adequate verbalization of understanding.     [facility]: Patient on precautions      Progress Co-operative with treatment. Stabilization is still ongoing. Remains homeless. See medical note by Ehsan Hein PA-C.    Plan: We are continuing to seek placement. Stabilization continues      Medications:  Scheduled Meds:    OLANZapine  15 mg Oral At Bedtime      Continuous Infusions:  PRN " Meds:.acetaminophen, alum & mag hydroxide-simethicone, hydrOXYzine, ibuprofen, OLANZapine **OR** OLANZapine, senna-docusate, traZODone     Medication adherence issues: MS Med Adherence Y/N: Yes, Hospitalization  Medication side effects: MEDICATION SIDE EFFECTS: no side effects reported  Benefit: Yes / No: Yes

## 2023-02-25 NOTE — PLAN OF CARE
" 02/17/23 0940   Individualization/Patient Specific Goals   Patient Personal Strengths Cooperative with treatment   Patient Vulnerabilities Homeless. Multiple histories of psychosis, confusion. Has no social or community support. Language barriers   Anxieties, Fears or Concerns Worried that \"the voices would not go away.\" Worries about housing. Worries about having no support system   Interprofessional Rounds   Summary Pt was admitted due to concerns for his safety. 52 male who has been staying in shelters. He has at least one prior mental health admission and was admitted to [facility] in June of 2021 for similar presentation. He has more than one medical record due to inconsistent spelling of his last name ( Mackenzie vs Ordoñez) and confusion over whether Avalos is middle name or last name.    Pt will be engaged in the therapeutic milieu and groups where he will learn and practice positive coping skills to cope with his symptoms. Will be stabilized via medications and adjunctive interventions including psychotherapy, OT.     Currently, pt is being stabilized. He has been referred to [facility] after getting emergency MA.      Participants    CTC;nursing; psychiatrist   Behavioral Team Discussion   Participants       Chasidy Swanson APRN CNP;Alyse Davis, LISSY; MARNIE Molina, Ph.D., [facility]. CTC;Livier Licea, OTR  Plan:      Consults: Hospitalist will be consulted if medical issues arise        Ongoing education given regarding diagnostic and treatment options with risks, benefits and alternatives and adequate verbalization of understanding.     Risk Assessment:  [facility] RISK ASSESSMENT: Patient on precautions      Progress Co-operative with treatment. Stabilization is still ongoing. Remains homeless. See medical note by Ehsan Hein PA-C.    Plan: We are continuing to seek placement. Stabilization continues.  Scheduled Meds:    OLANZapine  15 mg Oral At Bedtime      Continuous Infusions:  PRN Meds:.acetaminophen, alum " & mag hydroxide-simethicone, hydrOXYzine, ibuprofen, OLANZapine **OR** OLANZapine, senna-docusate, traZODone     Medication adherence issues: MS Med Adherence Y/N: No  Medication side effects: MEDICATION SIDE EFFECTS: no side effects reported  Benefit: Yes / No: Yes          Medications:  Scheduled Meds:    OLANZapine  15 mg Oral At Bedtime      Continuous Infusions:  PRN Meds:.acetaminophen, alum & mag hydroxide-simethicone, hydrOXYzine, ibuprofen, OLANZapine **OR** OLANZapine, senna-docusate, traZODone     Medication adherence issues: MS Med Adherence Y/N: Yes, Hospitalization  Medication side effects: MEDICATION SIDE EFFECTS: no side effects reported  Benefit: Yes / No: Yes

## 2023-02-25 NOTE — PROGRESS NOTES
02/24/23 2000   Group Therapy Session   Time Session Began 1900   Time Session Ended 1950   Total Time (minutes) 45   Total # Attendees 4   Group Type expressive therapy   Group Topic Covered balanced lifestyle;structured socialization;self-care activities   Group Session Detail Relaxation   Patient Response/Contribution cooperative with task   Patient Participation Detail Cooperatively engaged in Evening Music Relaxation group to decrease anxiety and promote sleep.  Calm affect, appropriately engaged in session, responding well to the music.  Turkish interpretation present and assisting.

## 2023-02-25 NOTE — PLAN OF CARE
CTC:  Waiting on the status of case management referral. Additional IRTS referral responses. Shelter that was found today was just for a night which is inappropriate for the pt. Called and was asked by [facility] to call again regarding long term shelter. Plan is to discharge to long term shelter when found pending IRTS.

## 2023-02-25 NOTE — PLAN OF CARE
Problem: Psychotic Signs/Symptoms  Goal: Improved Mood Symptoms  Intervention: Optimize Emotion and Mood  Recent Flowsheet Documentation  Taken 2/24/2023 1300 by Merissa Ray RN  Diversional Activity: television   Goal Outcome Evaluation:    Plan of Care Reviewed With: patient    Pt alert, pleasant and med compliant , visible on unit, attended groups, denies psych symptoms, awaiting placement at a long term  shelter.

## 2023-02-26 LAB — SARS-COV-2 RNA RESP QL NAA+PROBE: NEGATIVE

## 2023-02-26 PROCEDURE — 124N000003 HC R&B MH SENIOR/ADOLESCENT

## 2023-02-26 PROCEDURE — U0005 INFEC AGEN DETEC AMPLI PROBE: HCPCS | Performed by: PSYCHIATRY & NEUROLOGY

## 2023-02-26 PROCEDURE — 250N000013 HC RX MED GY IP 250 OP 250 PS 637: Performed by: NURSE PRACTITIONER

## 2023-02-26 RX ADMIN — OLANZAPINE 20 MG: 10 TABLET, FILM COATED ORAL at 19:42

## 2023-02-26 ASSESSMENT — ACTIVITIES OF DAILY LIVING (ADL)
ORAL_HYGIENE: INDEPENDENT
ADLS_ACUITY_SCORE: 32
HYGIENE/GROOMING: INDEPENDENT
ADLS_ACUITY_SCORE: 32
DRESS: INDEPENDENT
ORAL_HYGIENE: INDEPENDENT
DRESS: INDEPENDENT
ADLS_ACUITY_SCORE: 32
ADLS_ACUITY_SCORE: 32
HYGIENE/GROOMING: INDEPENDENT
ADLS_ACUITY_SCORE: 32
LAUNDRY: UNABLE TO COMPLETE
LAUNDRY: UNABLE TO COMPLETE
ADLS_ACUITY_SCORE: 32

## 2023-02-26 NOTE — PLAN OF CARE
Problem: Sleep Disturbance  Goal: Adequate Sleep/Rest  Outcome: Progressing   Goal Outcome Evaluation:    Patient slept comfortably for a total of 8.25 hours. No depressive behavior noted the whole night.

## 2023-02-26 NOTE — PLAN OF CARE
"Goal Outcome Evaluation:    Plan of Care Reviewed With: patient      /83   Pulse 81   Temp 98.5  F (36.9  C) (Oral)   Resp 16   Ht 1.626 m (5' 4\")   Wt 80.3 kg (177 lb 0.5 oz)   SpO2 96%   BMI 30.39 kg/m       Flat affect (pt's baseline?), mood calm, pt in his room most of shift, out for meals, pt verbalized via  his mood is \"good\" and he slept \"very good\" last night. Pt eating and taking fluids freely. Pt denies questions/concerns at this time. Pt waiting for placement.     Covid result negative      " No

## 2023-02-26 NOTE — PLAN OF CARE
Goal Outcome Evaluation:    Plan of Care Reviewed With: patient      Problem: Psychotic Signs/Symptoms  Goal: Optimal Cognitive Function (Psychotic Signs/Symptoms)  Outcome: Progressing          Patient continued to present calm, cooperative and pleasant upon approach.   Visible in the milieu. Attended group activities. Appetite and sleep are good.   Keeps himself clean and well groomed.   Discharge plan: pending placement.

## 2023-02-27 PROCEDURE — 250N000013 HC RX MED GY IP 250 OP 250 PS 637: Performed by: NURSE PRACTITIONER

## 2023-02-27 PROCEDURE — 124N000003 HC R&B MH SENIOR/ADOLESCENT

## 2023-02-27 PROCEDURE — 99231 SBSQ HOSP IP/OBS SF/LOW 25: CPT | Performed by: PSYCHIATRY & NEUROLOGY

## 2023-02-27 PROCEDURE — G0177 OPPS/PHP; TRAIN & EDUC SERV: HCPCS

## 2023-02-27 RX ADMIN — OLANZAPINE 20 MG: 10 TABLET, FILM COATED ORAL at 19:54

## 2023-02-27 ASSESSMENT — ACTIVITIES OF DAILY LIVING (ADL)
HYGIENE/GROOMING: INDEPENDENT
ADLS_ACUITY_SCORE: 32
ORAL_HYGIENE: INDEPENDENT
ADLS_ACUITY_SCORE: 32
LAUNDRY: UNABLE TO COMPLETE
ADLS_ACUITY_SCORE: 32
DRESS: INDEPENDENT;SCRUBS (BEHAVIORAL HEALTH)

## 2023-02-27 NOTE — PLAN OF CARE
02/27/23 1319   Group Therapy Session   Group Attendance attended group session   Time Session Began 1015   Time Session Ended 1100   Total Time (minutes) 45   Total # Attendees 3   Group Type psychotherapeutic;expressive therapy;task skill   Group Topic Covered problem-solving;cognitive activities;balanced lifestyle   Group Session Detail [facility] group to facilitate coping skill exploration, use of cognitive skills and problem solving, creative expression, clinical observation and facilitation of social, cognitive, and kinesthetic performance skills.    Patient Response/Contribution cooperative with task;listened actively;organized   Patient Participation Detail Stated feeling good and works on keeping things in balance, through the . He remembered plans and directions for this group's activity as discussed 1X with directions 3 days ago, before the weekend. He followed through on his ideas and plans. Worked at a steady pace. Pleasant on approach. Work was organized and he explained clear reasons for why he chose the designs he did.

## 2023-02-27 NOTE — PLAN OF CARE
Problem: Sleep Disturbance  Goal: Adequate Sleep/Rest  Outcome: Progressing   Goal Outcome Evaluation:    Patient slept for a total of 10 hours without any interruptions. No behavioral concerns raised the whole night.

## 2023-02-27 NOTE — PROGRESS NOTES
"PSYCHIATRY  PROGRESS NOTE     DATE OF SERVICE   02/27/2023          CHIEF COMPLAINT   \" I am fine\".     SUBJECTIVE   Nursing reports:  Patient denies anxiety, depression, SI, HI, and pain.     Patient in room during assessment. Cooperative, calm and engaging on approach with use of . Patient attended community meeting with use of . Speech clear and effective. Reported that his mood is fine and everything is good, no concerns noted by him. Affect was bright. Checked in with patient at about 1:30 pm. Patient notes no concerns or needs.     Patient was discussed with the  earlier today.  It seems that there are still no bed availability at that shoulder.   will continue to call the shelter every day in order to ensure that the patient has a bed to go to.     OBJECTIVE   Patient was seen and evaluated in the day area by himself, this was a face-to-face evaluation.  Interview was conducted in Japanese by this writer.  At this time patient is denying all psychiatric symptoms and is [place] for safety.  Has continued to report having some auditory hallucinations but these are not bothering him.  He denies any hallucinations at the moment of the assessment.  Patient is denying any safety concerns at this time and continues to be in agreement with the treatment and discharge plans.    No lab results found in last 7 days.       MEDICATIONS   Medications:  Scheduled Meds:    OLANZapine  20 mg Oral At Bedtime     Continuous Infusions:  PRN Meds:.acetaminophen, alum & mag hydroxide-simethicone, hydrOXYzine, ibuprofen, OLANZapine **OR** OLANZapine, senna-docusate, traZODone    Medication adherence issues: MS Med Adherence Y/N: Yes, Hospitalization  Medication side effects: MEDICATION SIDE EFFECTS: no side effects reported  Benefit: Yes / No: Yes       ROS   A comprehensive review of systems was negative.       MENTAL STATUS EXAM   Vitals: /73   Pulse 78   Temp 98.2  F " "(36.8  C) (Oral)   Resp 16   Ht 1.626 m (5' 4\")   Wt 80.3 kg (177 lb 0.5 oz)   SpO2 97%   BMI 30.39 kg/m        Appearance:  No apparent distress  Mood: \"I am okay\"  Affect: full range and bright was congruent to speech  Suicidal Ideation: PRESENT / ABSENT: absent   Homicidal Ideation: PRESENT / ABSENT: absent   Thought process: [place]   Thought content: Denied having any auditory hallucinations at the moment of the assessment.  fund of Knowledge: Below average  Attention/Concentration: Fair  Language ability:  Intact  Memory:  Immediate recall impaired, Short-term memory impaired and Long-term memory intact  Insight:  fair.  Judgement: fair  Orientation: Yes, x4  Psychomotor Behavior: normal or unremarkable    Muscle Strength and Tone: MuscleStrength: Normal  Gait and Station: Normal       LABS   personally reviewed.   No results found for this or any previous visit (from the past 24 hour(s)).  No lab results found in last 7 days.  No results found for: PHENYTOIN, PHENOBARB, VALPROATE, CBMZ       DIAGNOSIS   Principal Problem:    Schizophrenia, schizoaffective, chronic with acute exacerbation (H)    Active Problem List:  Patient Active Problem List   Diagnosis     Schizophrenia, schizoaffective, chronic with acute exacerbation (H)          PLAN   1. Ongoing education given regarding diagnostic and treatment options with risks, benefits and alternatives and adequate verbalization of understanding.  2.  Medications      Zyprexa to 20 mg at bedtime     3.  Medical team to follow the patient as needed.  4.   coordinating a safe discharge plan with the patient.      Risk Assessment: [facility] RISK ASSESSMENT: Patient able to contract for safety    Coordination of Care:   Treatment Plan reviewed and physician signed, Care discussed with Care/Treatment Team Members, Chart reviewed and Patient seen      Re-Certification I certify that the inpatient psychiatric facility services furnished since the " previous certification were, and continue to be, medically necessary for, either, treatment which could reasonably be expected to improve the patient s condition or diagnostic study and that the hospital records indicate that the services furnished were, either, intensive treatment services, admission and related services necessary for diagnostic study, or equivalent services.     I certify that the patient continues to need, on a daily basis, active treatment furnished directly by or requiring the supervision of inpatient psychiatric facility personnel.   I estimate 14 days of hospitalization is necessary for proper treatment of the patient. My plans for post-hospital care for this patient are  TBD     Paris Mccauley MD    -     02/27/2023  -     3:18 PM    Total time 25 minutes with > 50%spent on coordination of cares and psycho-education.    This note was created with help of Dragon dictation system. Grammatical / typing errors are not intentional.    Paris Mccauley MD

## 2023-02-27 NOTE — PLAN OF CARE
Goal Outcome Evaluation:    Plan of Care Reviewed With: patient      Patient has been present in the milieu. His affect is flat. His mood is calm. He did not attend groups. He has been working on word find puzzles. His appetite and sleep are good. He denies pain. He has been isolative and withdrawn. He is medication compliant.

## 2023-02-27 NOTE — PLAN OF CARE
Problem: Adult Behavioral Health Plan of Care  Goal: Plan of Care Review  Recent Flowsheet Documentation  Taken 2/27/2023 1733 by Vidya Sanders, RN  Patient Agreement with Plan of Care: agrees    Patient was isolative and withdrawn. Visible at the lounge at times. Busy doing word puzzles. Ate 100% at supper. MH assessment completed by using the telephone  services. He stated he was good. Pt denied SI, SIB, AVH. No voices bothering him at this time. He denied pain. No other concerns verbalized. Pt was compliant with taking his HS medication. Denied side effects of medications.

## 2023-02-27 NOTE — PLAN OF CARE
02/27/23 1329   Group Therapy Session   Group Attendance attended group session   Time Session Began 1100   Time Session Ended 1200   Total Time (minutes) 60   Total # Attendees 3   Group Type life skill;task skill;psychoeducation   Group Topic Covered coping skills/lifestyle management;cognitive therapy techniques;balanced lifestyle;self-care activities   Group Session Detail Participated in a group focused on the topic of identifying progress noted since admission, coping strategies that were helpful in the past, what is helping currently and setting a goal for the day.      Patient Response/Contribution cooperative with task;organized   Patient Participation Detail Appeared to listen through the  and offered answers in context and with appreciation stated of getting the help he needs. The  needed to leave a few minutes early though explained each question being discussed for him to write answers and process through information with time in group. Pleasant on approach.

## 2023-02-27 NOTE — PLAN OF CARE
02/24/23 1300   Group Therapy Session   Group Attendance attended group session   Time Session Began 1300   Time Session Ended 1350   Total Time (minutes) 50   Total # Attendees 2   Group Type psychotherapeutic   Group Topic Covered cognitive therapy techniques;cognitive activities;structured socialization;balanced lifestyle   Group Session Detail Activity using visuospatial concepts in problem solving   Patient Response/Contribution cooperative with task;organized   Patient Participation Detail Attended without the , re learned activity by watching and nonverbal directions. Pleasant,  came quite late and pt offered no questions to the activity.

## 2023-02-27 NOTE — PLAN OF CARE
Problem: Adult Behavioral Health Plan of Care  Goal: Individualized Daily Interaction Plan (IDIP)  Outcome: Progressing     Problem: Adult Behavioral Health Plan of Care  Goal: Absence of New-Onset Illness or Injury  Outcome: Progressing  Intervention: Identify and Manage Fall Risk  Recent Flowsheet Documentation  Taken 2/27/2023 1007 by Raisa Robertson RN  Safety Measures:    environmental rounds completed    monitored by video     Problem: Psychotic Signs/Symptoms  Goal: Increased Participation and Engagement (Psychotic Signs/Symptoms)  Outcome: Progressing  Intervention: Facilitate Participation and Engagement  Recent Flowsheet Documentation  Taken 2/27/2023 1007 by Raisa Robertson RN  Diversional Activity: (not observed doing any actiong-in room or walking.) other (see comments)     Problem: Psychotic Signs/Symptoms  Goal: Improved Mood Symptoms  Outcome: Progressing  Intervention: Optimize Emotion and Mood  Recent Flowsheet Documentation  Taken 2/27/2023 1007 by Raisa Robertson RN  Diversional Activity: (not observed doing any actiong-in room or walking.) other (see comments)     Problem: Plan of Care - These are the overarching goals to be used throughout the patient stay.    Goal: Absence of Hospital-Acquired Illness or Injury  Outcome: Progressing     Problem: Adult Behavioral Health Plan of Care  Goal: Adheres to Safety Considerations for Self and Others  Intervention: Develop and Maintain Individualized Safety Plan  Recent Flowsheet Documentation  Taken 2/27/2023 1007 by Raisa Robertson RN  Safety Measures:    environmental rounds completed    monitored by video     Problem: Psychotic Signs/Symptoms  Goal: Improved Psychomotor Symptoms (Psychotic Signs/Symptoms)  Intervention: Manage Psychomotor Movement  Recent Flowsheet Documentation  Taken 2/27/2023 1007 by Raisa Robertson RN  Diversional Activity: (not observed doing any actiong-in room or walking.) other (see comments)  Activity (Behavioral  Health): activity encouraged   Goal Outcome Evaluation:         Patient denies anxiety, depression, SI, HI, and pain.     Patient in room during assessment. Cooperative, calm and engaging on approach with use of . Patient attended community meeting with use of . Speech clear and effective. Reported that his mood is fine and everything is good, no concerns noted by him. Affect was bright. Checked in with patient at about 1:30 pm. Patient notes no concerns or needs.

## 2023-02-28 PROCEDURE — 250N000013 HC RX MED GY IP 250 OP 250 PS 637: Performed by: NURSE PRACTITIONER

## 2023-02-28 PROCEDURE — H2032 ACTIVITY THERAPY, PER 15 MIN: HCPCS

## 2023-02-28 PROCEDURE — G0177 OPPS/PHP; TRAIN & EDUC SERV: HCPCS

## 2023-02-28 PROCEDURE — 124N000003 HC R&B MH SENIOR/ADOLESCENT

## 2023-02-28 RX ADMIN — OLANZAPINE 20 MG: 10 TABLET, FILM COATED ORAL at 20:11

## 2023-02-28 ASSESSMENT — ACTIVITIES OF DAILY LIVING (ADL)
DRESS: INDEPENDENT;SCRUBS (BEHAVIORAL HEALTH)
ADLS_ACUITY_SCORE: 32
HYGIENE/GROOMING: INDEPENDENT
HYGIENE/GROOMING: INDEPENDENT
ADLS_ACUITY_SCORE: 32
LAUNDRY: WITH SUPERVISION
ADLS_ACUITY_SCORE: 32
ORAL_HYGIENE: INDEPENDENT
ADLS_ACUITY_SCORE: 32
ADLS_ACUITY_SCORE: 32
LAUNDRY: WITH SUPERVISION
DRESS: INDEPENDENT
ORAL_HYGIENE: INDEPENDENT
ADLS_ACUITY_SCORE: 32

## 2023-02-28 NOTE — PROGRESS NOTES
Pt participated in dance/movement therapy (D/MT) using music that supported somatic memories and organizing movement.  Culturally-based rhythms as well as time period music reinforced movement rooted in bringing back memories for support and integration. Pt stood and danced the entire session, with increasing fluidity. Pt did not have language interpretation so used gestures and other nonverbal means of communication.         02/28/23 1115   Expressive Therapy   Therapy Type dance/movement   Minutes of Treatment 50

## 2023-02-28 NOTE — PLAN OF CARE
Assessment/Intervention/Current Symtoms and Care Coordination  -Reviewed pt chart and documentation  -[facility] connect: Will call today at 10:00am.   -IRTS referrals are in place but the challenge is what the Emergency MA covers.   -Case management referrals were made. Still waiting for pt to be connected to a .     Plan: Will call [facility] today to request to see if they can guarantee a long term placement with a specific date. Will contact [facility] case management today to know when pt will be assigned a .    Discharge Plan or Goal  Pt has been referred to IRTS and the barrier has been his emergency MA.  Plan is for the pt to go to longterm shelter while waiting for IRTS.  Plan is for the pt to be discharged to an IRTS  Pt can go to a crisis bed while waiting for IRTS  Case Management referral has been made        Barriers to Discharge     Adult [facility] says the current open beds are for daily check in. This will not be appropriate for this patient. We are looking for a 30-day shelter for this patient. Per Adult [facility] staff, this can come up at any time. Staff requested to be contacted on a daily basis at 10:00am.  -Barrier to discharge is due to funding issues - pt is on emergency MA and [employer] is requesting specific information.   No long term placement. Shelter reported that he can only stay for day to day check in. Crisis bed is only guaranteed for 10 days.     Referral Status  IRTS, Crisis Bed, [facility]:  [facility]: 30-90 day stay  [address] [place] [address]  Phone: [phone]

## 2023-02-28 NOTE — PLAN OF CARE
Assessment/Intervention/Current Symtoms and Care Coordination  -Reviewed pt chart and documentation  -Called [facility], for long-term housing consideration ([phone]): Was asked to call back after 10:00am.   -IRTS referrals are in place but the challenge is what the Emergency MA covers.   -Case management referrals were made. Still waiting for pt to be connected to a .     Plan: Will call [facility] today to request to see if they can guarantee a long term placement with a specific date. Will contact [facility] case management today to know when pt will be assigned a .    Discharge Plan or Goal  Pt has been referred to IRTS and the barrier has been his emergency MA.  Plan is for the pt to go to longterm shelter while waiting for IRTS.  Plan is for the pt to be discharged to an IRTS  Pt can go to a crisis bed while waiting for IRTS  Case Management referral has been made        Barriers to Discharge     [facility] the [facility] open beds are for daily check in. This will not be appropriate for this patient. We are looking for a 30-day shelter for this patient. Per Adult [facility] staff, this can come up at any time. Staff requested to be contacted on a daily basis at 10:00am.  -Barrier to discharge is due to funding issues - pt is on emergency MA and [employer] is requesting specific information.   No long term placement. Shelter reported that he can only stay for day to day check in. Crisis bed is only guaranteed for 10 days.     Referral Status  IRTS, Crisis Bed, [facility]:  [facility]: 30-90 day stay  [address] [place] [address]  Phone: [phone]  Legal Status  Voluntary

## 2023-02-28 NOTE — PLAN OF CARE
Occupational Therapy     02/28/23 1200   Group Therapy Session   Group Attendance attended group session   Time Session Began 1015   Time Session Ended 1115   Total Time (minutes) 60   Total # Attendees 3   Group Type task skill;expressive therapy   Group Topic Covered cognitive activities;coping skills/lifestyle management;leisure exploration/use of leisure time;problem-solving;structured socialization   Group Session Detail OT: Education on healthy activity engagement with creative hands on endeavor (OT clinic) to increase concentration, focus, attention to task/detail, decision making, problem solving, frustration tolerance, task follow through, coping with stress, healthy leisure engagement, creative expression, and social engagement   Patient Response/Contribution cooperative with task;listened actively   Patient Participation Detail Pt sat among peers to complete selected creative hands on endeavor but did not engage in social interactions with peers or staff. Pt worked in a neat and tidy manner to complete project. Pt was social with therapist on approach but responded to therapist with short and brief responses. Pt cleaned up all supplies and his workspace.  present for duration.
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
## 2023-02-28 NOTE — PLAN OF CARE
Problem: Psychotic Signs/Symptoms  Goal: Optimal Cognitive Function (Psychotic Signs/Symptoms)  Outcome: Progressing   Goal Outcome Evaluation:    Patient is calm and attending group activities. He reports feeling well, denies anxiety, depression and SI/SIB. He endorses chronic auditory hallucinations that have improved with the medication. He is eating and drinking adequately, denies any pain or discomfort.

## 2023-02-28 NOTE — PROGRESS NOTES
Behavioral Health  Note    Behavioral Health  Spirituality Group Note    UNIT 3B    Name:    Juan Ordoñez                                                                     YOB: 1970    MRN:     [id number]                                                                       Age: 52 year old      Patient attended -led group, which included discussion of spirituality, coping with illness and building resilience.    Patient attended group for [facility] - spirituality groups are not billed.    The patient actively participated in group discussion      Thomas Minor, PhD  Associate

## 2023-02-28 NOTE — PLAN OF CARE
Occupational Therapy     02/28/23 1400   Group Therapy Session   Group Attendance attended group session   Time Session Began 1300   Time Session Ended 1400   Total Time (minutes) 60   Total # Attendees 4-5   Group Type recreation   Group Topic Covered cognitive activities;leisure exploration/use of leisure time;structured socialization   Group Session Detail OT: Education on cognitive wellness and interactive social activity (Doodle Dice) to increase concentration, focus, attention to task/detail, task follow through, memory recall, visual processing/scanning skills, healthy leisure engagement, coping with stress, heathy distraction engagement, cognitive wellness, social wellness, and social engagement   Patient Response/Contribution cooperative with task   Patient Participation Detail Pt arrived late to group and stayed for remainder. Pt sat among peers to engage in presented activity but did not engage in social interactions with peers (lack of socialization may have been due to language difference). Pt able to recall most of the directions from a previous encounter with the activity but needed occasional reminders to keep dice and not re-roll them. Pt reported he enjoyed the activity.  present for duration.

## 2023-02-28 NOTE — PLAN OF CARE
Problem: Sleep Disturbance  Goal: Adequate Sleep/Rest  Outcome: Progressing   Goal Outcome Evaluation:         Slept well for 8.5 hours, noted snoring. No concerns raised this shift.

## 2023-03-01 ENCOUNTER — APPOINTMENT (OUTPATIENT)
Dept: INTERPRETER SERVICES | Facility: CLINIC | Age: 53
End: 2023-03-01
Attending: PSYCHIATRY & NEUROLOGY
Payer: MEDICAID

## 2023-03-01 VITALS
WEIGHT: 177.25 LBS | SYSTOLIC BLOOD PRESSURE: 112 MMHG | RESPIRATION RATE: 16 BRPM | TEMPERATURE: 98 F | OXYGEN SATURATION: 100 % | HEIGHT: 64 IN | HEART RATE: 73 BPM | BODY MASS INDEX: 30.26 KG/M2 | DIASTOLIC BLOOD PRESSURE: 77 MMHG

## 2023-03-01 PROCEDURE — 99239 HOSP IP/OBS DSCHRG MGMT >30: CPT | Performed by: PSYCHIATRY & NEUROLOGY

## 2023-03-01 PROCEDURE — G0177 OPPS/PHP; TRAIN & EDUC SERV: HCPCS

## 2023-03-01 ASSESSMENT — ACTIVITIES OF DAILY LIVING (ADL)
ADLS_ACUITY_SCORE: 32
ORAL_HYGIENE: INDEPENDENT
ADLS_ACUITY_SCORE: 32
ADLS_ACUITY_SCORE: 32
DRESS: STREET CLOTHES;INDEPENDENT
ADLS_ACUITY_SCORE: 32
HYGIENE/GROOMING: INDEPENDENT
ADLS_ACUITY_SCORE: 32
LAUNDRY: UNABLE TO COMPLETE
ADLS_ACUITY_SCORE: 32

## 2023-03-01 NOTE — PLAN OF CARE
"  Problem: Adult Behavioral Health Plan of Care  Goal: Plan of Care Review  Recent Flowsheet Documentation  Taken 2/28/2023 1816 by Odilia Michaels, RN  Patient Agreement with Plan of Care: agrees   Goal Outcome Evaluation:    Plan of Care Reviewed With: patient        /84   Pulse 82   Temp 98.4  F (36.9  C) (Oral)   Resp 16   Ht 1.626 m (5' 4\")   Wt 80.4 kg (177 lb 4 oz)   SpO2 97%   BMI 30.42 kg/m       Pt alert. Isolative and withdrawn to the room. Was napping at the start of the shift. Came out for dinner with 100% consumption. Pt was assessed through phone . Denies SI/SIB/AH/VH. Denies depression and anxiety. Stated \" Hallucinations was in the past and not anymore\" Went back to the room after assessment. Did not attend group and did not socialize with peers. Compliant with medication. Contact for safety. Independent with ADL's. All precautions maintained.Continue to monitor.           "

## 2023-03-01 NOTE — PLAN OF CARE
03/01/23 1541   Group Therapy Session   Group Attendance attended group session   Time Session Began 1315   Time Session Ended 1415   Total Time (minutes) 45   Total # Attendees 4   Group Type life skill;task skill;expressive therapy   Group Topic Covered cognitive activities;leisure exploration/use of leisure time;structured socialization;coping skills/lifestyle management;relapse prevention   Group Session Detail OT Topic Group-Gratitude and Gratitude collages   Patient Response/Contribution cooperative with task;organized   Patient Participation Detail pt actively engaged in group activities with  present. pt was able to list 20 things to be grateful for with additional time. pt worked quietly and meticulously on gratitude collage until completion. pt shared collage and thoughts when encouraged.  pt shared he was grateful for therapies.

## 2023-03-01 NOTE — PLAN OF CARE
Occupational Therapy     03/01/23 1300   Group Therapy Session   Group Attendance attended group session   Time Session Began 1015   Time Session Ended 1115   Total Time (minutes) 45   Total # Attendees 3   Group Type task skill;expressive therapy   Group Topic Covered cognitive activities;leisure exploration/use of leisure time;problem-solving;structured socialization   Group Session Detail OT: Education on healthy activity engagement with creative hands on endeavor (OT clinic) to increase concentration, focus, attention to task/detail, decision making, problem solving, frustration tolerance, task follow through, coping with stress, healthy leisure engagement, creative expression, and social engagement   Patient Response/Contribution cooperative with task;listened actively;other (see comments)  (pleasant; cooperative; engaged in approach)   Patient Participation Detail Pt sat among peers to complete selected creative hands on endeavor but did not engage in social interactions with peers; lack of socialization may have been due to language difference. Pt worked in a neat and tidy manner to complete project. Pt participated in group trivia questions via . Pt pulled from group by provider and returned multiple times. Pt reported he enjoyed the group. Interrupter present for duration.

## 2023-03-01 NOTE — PLAN OF CARE
Goal Outcome Evaluation:    Plan of Care Reviewed With: patient        Patient is discharging. Discharged instructions given by the AM RN with an  on the phone. Patient waited for few minutes for his transport. Patient was  by a cab. He was accompanied by a staff to the main door of the building.

## 2023-03-01 NOTE — PLAN OF CARE
CTC: [facility] nurse called this writer and spoke with this writer and pt's nurse. Asked for pt's insurance and medications. Reported she will consult with [facility] psychotherapist and call back.

## 2023-03-01 NOTE — PLAN OF CARE
CTC: Received a call from [facility] staff. Staff requested to speak with the pt. Staff asked the pt for his social security number. The pt gave a response. [facility] then asked to speak with this writer and asked how the pt can have an MA. Writer informed her that it is emergency MA that the pt has through [facility]. She asked why the pt is not being sent to [facility]. Writer informed her that the pt is homeless and is currently residing in [facility]. [facility] staff reported that they are short-staffed today and can only take a new admit at 3:30pm. Writer informed her that the pt will be ready to come at 4:00pm. Plan: Pt to discharge to [facility] today at 4:00pm via a cab.      -Called [facility] and re-scheduled pt's psychiatry appointment. Appointment to occur on March 8 at 12:30pm. Updated on pt's AVS.

## 2023-03-01 NOTE — PLAN OF CARE
CTC:   -AVS ready.  -medical appointment scheduled with [facility]  -Psychiatry appointment with [facility]  -Discharging to [facility] at 4:00pm  -[facility] case management referral made with [facility]      [facility] at 4:00pm with a cab.

## 2023-03-01 NOTE — PLAN OF CARE
Problem: Depression  Goal: Improved Mood  Outcome: Progressing   Goal Outcome Evaluation:             Slept well for 10 hours, no concerns raised tonight. Routine safety checks q 15 mins done.

## 2023-03-01 NOTE — PLAN OF CARE
"  Problem: Adult Behavioral Health Plan of Care  Goal: Patient-Specific Goal (Individualization)  Description: You can add care plan individualizations to a care plan. Examples of Individualization might be:  \"Parent requests to be called daily at 9am for status\", \"I have a hard time hearing out of my right ear\", or \"Do not touch me to wake me up as it startles me\".  Outcome: Adequate for Care Transition   Goal Outcome Evaluation:  Pt is none English speaking but uses  for assessment and cares . Pt is calm and approachable  and excited to go home . Pt out on the milieu ate well for meals . He denied all mental health SX . Pt offers no complained of pain or discomfort . Pt is aware of his discharge today and states he is happy to be discharge form  facility .          "

## 2023-03-01 NOTE — PLAN OF CARE
Occupational Therapy     03/01/23 1400   Group Therapy Session   Group Attendance attended group session   Time Session Began 1115   Time Session Ended 1200   Total Time (minutes) 45   Total # Attendees 3   Group Type recreation   Group Topic Covered coping skills/lifestyle management;leisure exploration/use of leisure time;structured socialization   Group Session Detail OT: Education on physical and social wellness with physical movement (Exercise Jenga) to increase concentration, attention to task, symptom management, coping with stress, sharing information about self, listening to others, physical wellness, social wellness, and overall well-being   Patient Response/Contribution cooperative with task   Patient Participation Detail Pt sat among peers to complete presented activity but did not engage in social interactions with peers; lack of socialization may heave been due to language differences. Pt engaged in all physical movement activities and reported he enjoyed the activity.  present for duration.

## 2023-03-01 NOTE — DISCHARGE SUMMARY
PSYCHIATRY  DISCHARGE SUMMARY     DATE OF DISCHARGE   03/01/2023           DISCHARGE DIAGNOSIS   Schizophrenia, schizoaffective, chronic with acute exacerbation (H)    Patient Active Problem List   Diagnosis     Schizophrenia, schizoaffective, chronic with acute exacerbation (H)          REASON FOR ADMISSION   This is a 52 year old male with history of Schizophrenia, schizoaffective, chronic with acute exacerbation (H).    HPI:         52 male who has been staying in shelters. He has at least one prior mental health admission and was admitted to [facility] in June of 2021 for similar presentation. He has more than one medical record due to inconsistent spelling of his last name ( Mackenzie vs Ordoñez) and confusion over whether Avalos is middle name or last name.     Patient was brought to hospital by police due to wandering in traffic. Patient suffers from chronic hallucinations, and has not been taking medications for at least several months.     Patient is cooperative to interview. He reports that auditory hallucinations have diminished since resuming Zyprexa in ER. He denies specific suicidal intent. He denies homicidal thoughts. He can not give substantial details about prior mental health treatment.     According to ER report by   Eben Carlson MD  12/29/22 2212  Juan Ordoñez is a 52 year old male with apparent history of psychosis who presents for evaluation after abnormal behavior this evening.  The patient was reportedly in street attempting to run in front of cars.  He was guarded with police who picked him up and brought him here but indicated to an ED RN that he is having voices that are telling him to hurt himself including to run in front of cars.  In conversations with me, the patient denies any concerns but is evasive regarding why he was in the street.  Medical Decision Making:  This is a 52 year old male who presents with acute suicidal behavior.  He is not intoxicated. There is no  metabolic acidosis to suggest toxic alcohol ingestion and ethanol level is negative.  The patient is medically cleared for further mental health care.  Given the patient's history of psychosis and his guarded history that he provides, I am concerned that he indeed is suicidal.  Patient handed off to my partner pending placement to a psychiatric bed.  We will restart Zydis as the patient has been on this previously but stopped taking this.        According to [facility] assessment done in ER by Jenny Vincent, LICSW, MSW, LICSW, Psychotherapist  DEC - Triage & Transition Services  Summary of Patient Situation  Patient arrived via EMS after being found at an intersection attempting to jump in front of traffic. Patient was waiting at the intersection and would run out in front of the cars as they started to move. He told triage that he hears voices and that the voices told him to go into the road.   Met with pt with . He presents with flat affect and appears guarded. He appears distracted with possible internal preoccupation and is unable or willing to provide a good history.   He admits that he was walking into traffic in an intersection. He denies that he was attempting to end his life. When asked what he was trying to do he states 'I don't know how to explain to you' and is not able to expand on this at all, even when asked later.   He reports hearing constant voices that say 'offensive' things that are often racist. He denies that the voices told him to walk into traffic but when asked if he was feeling distressed by the voices leading to the incident he says 'yes.' He denies SI/HI/plan/intent. Denies hx suicide attempts. He does not seem to care if he is admitted or not and tells writer to decide. He is wiling to come in to a MH unit.    Brief Psychosocial History  Pt reports that he is single. He is from [place] and states he has lived in [place] for 10+ years. He has two daughters and they, along with all  of his family, still live in [place]. He denies having any friends or family here in [place]. He is currently homeless and has been staying at a shelter in [place]. He is unemployed.   Significant Clinical History  Pt states that he does not know about his mental health history. When reminded of an inpatient MH admission in 2021, he does recall this. Per chart, pt was admitted to [facility] for inpatient MH in June 2021 for psychosis and was started on Zyprexa. When asked today, he reports that he took it until he ran out but then never got refills. He has no current providers.   Per chart, pt had reported substance use at previous admission with cannabis, cocaine and methamphetamine. He denies any recent use today.        HOSPITAL COURSE   Admitted due to aforementioned presentation.  Education regarding diagnostic and treatment options with risks, benefits and alternatives and adequate verbalization of understanding.  Discussed reviewed in further detail, stressors and events leading to presentation.    Admitted on a voluntary    Patient was started on a multimodal therapy that included medications.  After discussing with the patient his past psychiatric history and medication use we decided to start Zyprexa that was slowly increased to 20 mg at bedtime.  Patient is well known to this writer as he has been admitted multiple times at [facility] with a similar presentation.  Patient has been educated about the use of Zyprexa including reasons for prescribing this medication, benefits, risk and side effects.  Patient continues to be in agreement with taking the Zyprexa and at this time he is denying having any side effects from the treatment.  At this time the patient is scheduled to be discharged to a crisis bed.  Patient is denying having any thoughts of harming himself or harming others and has been able to contract for safety.  Denies any homicidal ideations.  Patient is denying having any  "hallucinations and said that for the last few days he has not heard any voices.  No delusions have been elicited.  During the hospitalization patient has not demonstrated any aggressive or self-injurious behaviors and this has been consistent with my observations and the observations of the staff.  Patient attended most groups and he seemed to have engaged in the therapy.    Throughout the hospital stay patient work with the medical team, Occupational Therapy and  department.   was able to assist the patient with completing the emergency medical assistance application and has refer the patient for case management.   also communicated with the [place] [employer] in order to start assisting the patient with fixing his legal status in the country.  At this time the patient has verbalized good understanding of the discharge plans and denies any safety concerns about leaving the hospital.       MENTAL STATUS EXAM   Vitals: /77   Pulse 73   Temp 98  F (36.7  C) (Oral)   Resp 16   Ht 1.626 m (5' 4\")   Wt 80.4 kg (177 lb 4 oz)   SpO2 100%   BMI 30.42 kg/m      Mental Status:  Appearance:  No apparent distress  Mood:  {Mood: Normal  Affect: full range was was congruent to speech  Suicidal Ideation: PRESENT / ABSENT: absent   Homicidal Ideation: PRESENT / ABSENT: absent   Thought process: unremarkable   Thought content: devoid of  suicidal and violent ideation.   Fund of Knowledge: Average  Attention/Concentration: Normal  Language ability:  Intact  Memory:  Immediate recall intact, Short-term memory intact and Long-term memory intact  Insight:  good.  Judgement: good  Orientation: Yes, x4  Psychomotor Behavior: normal or unremarkable    Muscle Strength and Tone: MuscleStrength: Normal  Gait and Station: Normal         DISCHARGE MEDICATIONS   Discharge Medication Options:   Current Discharge Medication List      START taking these medications    Details   !! OLANZapine " (ZYPREXA) 15 MG tablet Take 1 tablet (15 mg) by mouth At Bedtime  Qty: 30 tablet, Refills: 0    Associated Diagnoses: Schizophrenia, schizoaffective, chronic with acute exacerbation (H)      !! OLANZapine (ZYPREXA) 20 MG tablet Take 1 tablet (20 mg) by mouth At Bedtime  Qty: 30 tablet, Refills: 0    Associated Diagnoses: Schizophrenia, schizoaffective, chronic with acute exacerbation (H)       !! - Potential duplicate medications found. Please discuss with provider.      CONTINUE these medications which have CHANGED    Details   ibuprofen (ADVIL/MOTRIN) 600 MG tablet Take 1 tablet (600 mg) by mouth every 6 hours as needed for moderate pain (4-6) or fever  Qty: 30 tablet, Refills: 0    Associated Diagnoses: Schizophrenia, schizoaffective, chronic with acute exacerbation (H)         CONTINUE these medications which have NOT CHANGED    Details   melatonin 3 MG CAPS Take by mouth nightly as needed (sleep)         STOP taking these medications       LORazepam (ATIVAN) 1 MG tablet Comments:   Reason for Stopping:         OLANZapine zydis (ZYPREXA) 10 MG ODT Comments:   Reason for Stopping:               Medication adherence issues: MS Med Adherence Y/N: Yes, Hospitalization  Medication side effects: MEDICATION SIDE EFFECTS: no side effects reported         DISCHARGE PLAN   1.  Education given regarding diagnostic and treatment options with risks, benefits and alternatives with adequate verbalization of understanding.  2.  Discharge to crisis bed.  Upon detailed review of risk factors, patient amenable for release.   3.  Continue aforementioned medications and associated medication changes with follow-up by outpatient mental health provider.  4.  Crisis management planning in place.    5.  Continue efforts for sobriety.    .  Nursing and  to review further discharge recommendations.     TOTAL TIME:  Greater than 30 minutes for discharge planning.    This note was created with help of Dragon dictation system.  Grammatical / typing errors are not intentional.    Paris Mccauley MD

## 2023-03-01 NOTE — PLAN OF CARE
CTC: Writer facilitated a crisis bed assessment between the pt and [facility] crisis bed, with the aid of an , this morning.   Pt responded to questions asked through his . When asked where he will be going after the crisis bed, the pt reported that he has a place to go after the crisis bed. Following the assessment, the  asked for pt's social security number. Pt responded and the  reported that she will call back after review.    Plan: Awaiting for a call-back.

## 2023-03-02 ENCOUNTER — APPOINTMENT (OUTPATIENT)
Dept: INTERPRETER SERVICES | Facility: CLINIC | Age: 53
End: 2023-03-02

## 2023-10-18 NOTE — PLAN OF CARE
Assessment/Intervention/Current Symtoms and Care Coordination  CTC (writer)  reviewed pt's chart.   -Awaiting on [facility] referral feedback  -With Emergency MA - referring for relocation service coordination - awaiting feedback    Discharge Plan or Goal  Pt is new and is undergoing stabilization. He is currently homeless      Barriers to Discharge   Safe discharge plan, ongoing symptom severity (highly disorganized, and having psychosis in the context of hearing voices), and medication evaluation/assessment.     Referral Status  Emergency Medical Assistance to be able to access resources  Relocation assistance    Legal Status  Voluntary     Detail Level: Zone Continue Regimen: Triamcinolone apply twice daily for 3 weeks Render In Strict Bullet Format?: No